# Patient Record
Sex: FEMALE | Race: WHITE | NOT HISPANIC OR LATINO | Employment: OTHER | URBAN - METROPOLITAN AREA
[De-identification: names, ages, dates, MRNs, and addresses within clinical notes are randomized per-mention and may not be internally consistent; named-entity substitution may affect disease eponyms.]

---

## 2017-01-17 DIAGNOSIS — E78.5 HYPERLIPIDEMIA LDL GOAL <130: Primary | ICD-10-CM

## 2017-01-17 NOTE — TELEPHONE ENCOUNTER
Routing refill request to provider for review/approval because:  Evei given x1 and patient did not follow up, please advise  Patient needs to be seen because it has been more than 1 year since last office visit.

## 2017-01-17 NOTE — TELEPHONE ENCOUNTER
omeprazole 20mg      Last Written Prescription Date: 12/12/16  Last Fill Quantity: 30,  # refills: 0   Last Office Visit with [facility], [facility] or [facility] prescribing provider: 08/07/15

## 2017-07-10 ENCOUNTER — HOSPITAL ENCOUNTER (OUTPATIENT)
Dept: MAMMOGRAPHY | Facility: CLINIC | Age: 57
Discharge: HOME | End: 2017-07-10
Attending: FAMILY MEDICINE | Admitting: FAMILY MEDICINE
Payer: COMMERCIAL

## 2017-07-10 DIAGNOSIS — Z12.31 VISIT FOR SCREENING MAMMOGRAM: ICD-10-CM

## 2017-07-10 PROCEDURE — G0202 SCR MAMMO BI INCL CAD: HCPCS

## 2017-07-10 PROCEDURE — 77063 BREAST TOMOSYNTHESIS BI: CPT

## 2019-01-03 ENCOUNTER — HOSPITAL ENCOUNTER (OUTPATIENT)
Facility: CLINIC | Age: 59
Discharge: HOME | End: 2019-01-03
Attending: COLON & RECTAL SURGERY | Admitting: COLON & RECTAL SURGERY
Payer: COMMERCIAL

## 2019-01-03 VITALS
OXYGEN SATURATION: 94 % | DIASTOLIC BLOOD PRESSURE: 91 MMHG | HEART RATE: 111 BPM | RESPIRATION RATE: 17 BRPM | SYSTOLIC BLOOD PRESSURE: 124 MMHG

## 2019-01-03 LAB — COLONOSCOPY: NORMAL

## 2019-01-03 PROCEDURE — 25000128 H RX IP 250 OP 636: Performed by: COLON & RECTAL SURGERY

## 2019-01-03 PROCEDURE — 88305 TISSUE EXAM BY PATHOLOGIST: CPT | Performed by: COLON & RECTAL SURGERY

## 2019-01-03 PROCEDURE — 99153 MOD SED SAME PHYS/QHP EA: CPT

## 2019-01-03 PROCEDURE — 45380 COLONOSCOPY AND BIOPSY: CPT | Performed by: COLON & RECTAL SURGERY

## 2019-01-03 PROCEDURE — G0500 MOD SEDAT ENDO SERVICE >5YRS: HCPCS | Performed by: COLON & RECTAL SURGERY

## 2019-01-03 PROCEDURE — 88305 TISSUE EXAM BY PATHOLOGIST: CPT | Mod: 26 | Performed by: COLON & RECTAL SURGERY

## 2019-01-03 RX ORDER — LIDOCAINE 40 MG/G
CREAM TOPICAL
Status: DISCONTINUED | OUTPATIENT
Start: 2019-01-03 | End: 2019-01-03 | Stop reason: HOSPADM

## 2019-01-03 RX ORDER — FENTANYL CITRATE 50 UG/ML
INJECTION, SOLUTION INTRAMUSCULAR; INTRAVENOUS PRN
Status: DISCONTINUED | OUTPATIENT
Start: 2019-01-03 | End: 2019-01-03 | Stop reason: HOSPADM

## 2019-01-03 RX ORDER — ONDANSETRON 2 MG/ML
4 INJECTION INTRAMUSCULAR; INTRAVENOUS
Status: COMPLETED | OUTPATIENT
Start: 2019-01-03 | End: 2019-01-03

## 2019-01-03 NOTE — H&P
Pre-Endoscopy History and Physical     Linda Tomlin MRN# [id number]   YOB: 1960 Age: 58 year old     Date of Procedure: (Not on file)  Primary care provider: Ramses Murphy  Type of Endoscopy: Colonoscopy  Reason for Procedure: History of diverticulitis and diarrhea  Type of Anesthesia Anticipated: Moderate Sedation    HPI:    Linda is a 58 year old female who will be undergoing the above procedure.      A history and physical has been performed. The patient's medications and allergies have been reviewed. The risks and benefits of the procedure and the sedation options and risks were discussed with the patient.  All questions were answered and informed consent was obtained.      She denies a personal or family history of anesthesia complications or bleeding disorders.     Allergies   Allergen Reactions     Percocet [Oxycodone-Acetaminophen] Rash     Chantix [Varenicline Tartrate] Nausea and Vomiting     SEVERE VOMITING          No current facility-administered medications on file prior to encounter.   Current Outpatient Medications on File Prior to Encounter:  atorvastatin (LIPITOR) 80 MG tablet Take 1 tablet (80 mg) by mouth every evening INDICATION: TO LOWER CHOLESTEROL AND TO HELP REDUCE RISK OF HEART ATTACK AND STROKE   citalopram (CELEXA) 40 MG tablet Take 1 tablet (40 mg) by mouth daily INDICATION: TO CONTROL SYMPTOMS OF DEPRESSION; No further refills till seen in the office.   cyanocobalamin (VITAMIN B12) 1000 MCG/ML injection Inject 1 mL (1,000 mcg) Subcutaneous every 14 days 1 CC EVERY TWO WEEKS INDICATION: FOR VITAMIN B12 SUPPLEMENTATION   metoprolol (TOPROL-XL) 50 MG 24 hr tablet Take 1 tablet (50 mg) by mouth 2 times daily INDICATION: TO LOWER BLOOD PRESSURE, no further refills until seen.   omeprazole (PRILOSEC) 20 MG CR capsule Take 1 capsule (20 mg) by mouth every morning (before breakfast) INDICATION: TO CONTROL REFLUX SYMPTOMS, No further refills till seen in the office.  "  Syringe/Needle, Disp, (BD ECLIPSE SYRINGE) 25G X 5/8\" 3 ML MISC 1,000 mcg See Admin Instructions AS DIRECTED FOR B12 SHOTS   traZODone (DESYREL) 100 MG tablet Take 1 tablet (100 mg) by mouth nightly as needed for sleep   vitamin C-electrolytes (EMERGEN-C) 1000mg vitamin C super orange drink mix Mix 1 packet in 4-6oz water and take once daily, INDICATION: VITAMIN C SUPPLEMENTION       Patient Active Problem List   Diagnosis     Moderate major depression (H)     HTN (hypertension)     Vitamin D deficiency disease     Hyperlipidemia LDL goal <130     Encounter for long-term (current) use of medications     Vitamin B12 deficiency without anemia     Scurvy     Closed fracture of one or more phalanges of foot     Vitamin D deficiency     Polycythemia     Low back pain     Ascorbic acid deficiency     Hyperlipidemia with target LDL less than 130     plantar fibroma, right         Past Medical History:   Diagnosis Date     Breast cancer (H) 2009    lumpectomy and radiation x 35     Diverticular disease of colon      Ovarian cyst         Past Surgical History:   Procedure Laterality Date     COLON SURGERY  [id number]    HEMICOLECTOMY     HAND SURGERY      Carpal Tunnel Finger/hand Surgery     HYSTERECTOMY, PAP NO LONGER INDICATED       LUMPECTOMY BREAST  2008     SALPINGO OOPHORECTOMY,R/L/SIMON      Salpingo Oophorectomy, RT/LT/SIMON     TONSILLECTOMY         Social History     Tobacco Use     Smoking status: Former Smoker     Packs/day: 1.00     Years: 10.00     Pack years: 10.00     Types: Cigarettes     Last attempt to quit: 2012     Years since quittin.4     Smokeless tobacco: Never Used     Tobacco comment: 2 years now as of 15   Substance Use Topics     Alcohol use: Yes     Alcohol/week: 0.5 - 1.0 oz     Types: 1 - 2 drink(s) per week       Family History   Problem Relation Age of Onset     Blood Disease Mother         HHT     Allergies Father      Breast Cancer Maternal Grandmother      " "Cancer Maternal Grandmother         mouth     Allergies Sister      Allergies Paternal Aunt      Allergies Paternal Uncle      Cancer - colorectal Maternal Uncle        REVIEW OF SYSTEMS:     5 point ROS negative except as noted above in HPI, including Gen., Resp., CV, GI &  system review.      PHYSICAL EXAM:   There were no vitals taken for this visit. Estimated body mass index is 28.45 kg/m  as calculated from the following:    Height as of 8/7/15: 1.6 m (5' 3\").    Weight as of 8/7/15: 72.8 kg (160 lb 9.6 oz).   GENERAL APPEARANCE: healthy and alert  MENTAL STATUS: alert  RESP: lungs clear to auscultation - no rales, rhonchi or wheezes  CV: regular rates and rhythm and normal S1 S2, no S3 or S4      IMPRESSION   ASA Class 2 - Mild systemic disease        PLAN:     Plan for colonoscopy. We discussed the risks, benefits and alternatives and the patient wished to proceed.    The above has been forwarded to the consulting provider.      Mane Jay MD  [facility]  Phone: [phone]  January 3, 2019    "

## 2019-01-04 LAB — COPATH REPORT: NORMAL

## 2019-04-18 ENCOUNTER — HOSPITAL PATHOLOGY (OUTPATIENT)
Dept: OTHER | Facility: CLINIC | Age: 59
End: 2019-04-18

## 2019-04-22 LAB — COPATH REPORT: NORMAL

## 2019-06-03 ENCOUNTER — HOSPITAL ENCOUNTER (OUTPATIENT)
Dept: MAMMOGRAPHY | Facility: CLINIC | Age: 59
Discharge: HOME | End: 2019-06-03
Attending: FAMILY MEDICINE | Admitting: FAMILY MEDICINE
Payer: COMMERCIAL

## 2019-06-03 DIAGNOSIS — Z12.31 VISIT FOR SCREENING MAMMOGRAM: ICD-10-CM

## 2019-06-03 PROCEDURE — 77063 BREAST TOMOSYNTHESIS BI: CPT

## 2019-09-29 ENCOUNTER — HEALTH MAINTENANCE LETTER (OUTPATIENT)
Age: 59
End: 2019-09-29

## 2021-01-14 ENCOUNTER — HEALTH MAINTENANCE LETTER (OUTPATIENT)
Age: 61
End: 2021-01-14

## 2021-03-01 ENCOUNTER — HOSPITAL ENCOUNTER (OUTPATIENT)
Dept: MAMMOGRAPHY | Facility: CLINIC | Age: 61
Discharge: HOME | End: 2021-03-01
Attending: FAMILY MEDICINE | Admitting: FAMILY MEDICINE
Payer: COMMERCIAL

## 2021-03-01 DIAGNOSIS — Z12.31 VISIT FOR SCREENING MAMMOGRAM: ICD-10-CM

## 2021-03-01 PROCEDURE — 77063 BREAST TOMOSYNTHESIS BI: CPT

## 2021-09-30 ENCOUNTER — TRANSFERRED RECORDS (OUTPATIENT)
Dept: HEALTH INFORMATION MANAGEMENT | Facility: CLINIC | Age: 61
End: 2021-09-30

## 2021-09-30 DIAGNOSIS — R94.31 ABNORMAL ELECTROCARDIOGRAM: Primary | ICD-10-CM

## 2021-10-03 ENCOUNTER — MEDICAL CORRESPONDENCE (OUTPATIENT)
Dept: HEALTH INFORMATION MANAGEMENT | Facility: CLINIC | Age: 61
End: 2021-10-03

## 2021-10-06 ENCOUNTER — OFFICE VISIT (OUTPATIENT)
Dept: CARDIOLOGY | Facility: CLINIC | Age: 61
End: 2021-10-06
Payer: COMMERCIAL

## 2021-10-06 VITALS
WEIGHT: 143 LBS | BODY MASS INDEX: 25.33 KG/M2 | HEART RATE: 87 BPM | SYSTOLIC BLOOD PRESSURE: 125 MMHG | DIASTOLIC BLOOD PRESSURE: 79 MMHG

## 2021-10-06 DIAGNOSIS — Z71.6 ENCOUNTER FOR SMOKING CESSATION COUNSELING: Primary | ICD-10-CM

## 2021-10-06 DIAGNOSIS — R94.31 ABNORMAL ELECTROCARDIOGRAM: ICD-10-CM

## 2021-10-06 PROCEDURE — 99203 OFFICE O/P NEW LOW 30 MIN: CPT | Performed by: INTERNAL MEDICINE

## 2021-10-06 RX ORDER — ALBUTEROL SULFATE 90 UG/1
AEROSOL, METERED RESPIRATORY (INHALATION)
COMMUNITY
Start: 2021-03-29 | End: 2022-01-09

## 2021-10-06 RX ORDER — BUPROPION HYDROCHLORIDE 150 MG/1
150 TABLET ORAL DAILY
COMMUNITY
Start: 2021-07-26 | End: 2022-01-13

## 2021-10-06 RX ORDER — MUPIROCIN 20 MG/G
OINTMENT TOPICAL
COMMUNITY
Start: 2021-07-26 | End: 2022-01-09

## 2021-10-06 RX ORDER — LISINOPRIL 10 MG/1
10 TABLET ORAL DAILY
COMMUNITY
Start: 2021-07-26 | End: 2022-01-13 | Stop reason: DRUGHIGH

## 2021-10-06 RX ORDER — METRONIDAZOLE 7.5 MG/G
GEL TOPICAL
COMMUNITY
Start: 2021-06-11 | End: 2022-01-09

## 2021-10-06 RX ORDER — ESCITALOPRAM OXALATE 20 MG/1
20 TABLET ORAL DAILY
COMMUNITY
Start: 2021-07-26 | End: 2022-01-13

## 2021-10-06 NOTE — PROGRESS NOTES
HPI and Plan:     Nydia is a very pleasant 61-year-old comes in by referral from her primary physician for abnormal EKG.  She is a history of smoking half pack per day currently hypertension hyperlipidemia who had some abdominal pain and shoulder discomfort and therefore had an EKG done.  EKG showed evidence of not a normal sinus rhythm with Q waves in V1 and V2 consistent with old anteroseptal infarction and left atrial enlargement.  She said she had an EKG done and going through care everywhere that was done about 12 years ago but is not able to be pulled up.  She has had no recurrent shoulder pain.  Shoulder pain did not accompany shortness of breath diaphoresis nausea vomiting.  She does no regular exercise but is active.  Unfortunately her  has recently been diagnosed with Alzheimer's.    Assessment: EKG has to be assumed to be new anteroseptal infarction.  This could be a false positive.  Recommend echocardiogram and if abnormal recommend stress testing.  If her shoulder pain should return I recommend stress testing and further evaluation as well.  Otherwise we talked very frankly about smoking cessation changes in her diet and exercise plan for her long-term cardiovascular health.      Orders Placed This Encounter   Medications     VENTOLIN  (90 Base) MCG/ACT inhaler     Sig: INHALE 1 PUFF EVERY 4-6 HOURS AS NEEDED FOR COUGH AND CHEST TIGHTNESS     buPROPion (WELLBUTRIN XL) 150 MG 24 hr tablet     Sig: Take 150 mg by mouth daily     escitalopram (LEXAPRO) 20 MG tablet     Sig: Take 20 mg by mouth daily     lisinopril (ZESTRIL) 10 MG tablet     Sig: Take 10 mg by mouth daily     metroNIDAZOLE (METROGEL) 0.75 % external gel     Sig: APPLY TOPICALLY TO AFFECTED AREA ONCE DAILY AT BEDTIME AS NEEDED     mupirocin (BACTROBAN) 2 % external ointment     Sig: APPLY 1 APPLICATION UP TO TWICE DAILY FOR 7 DAYS     There are no discontinued medications.      Encounter Diagnoses   Name Primary?     Abnormal  electrocardiogram      Encounter for smoking cessation counseling Yes       CURRENT MEDICATIONS:  Current Outpatient Medications   Medication Sig Dispense Refill     atorvastatin (LIPITOR) 80 MG tablet Take 1 tablet (80 mg) by mouth every evening INDICATION: TO LOWER CHOLESTEROL AND TO HELP REDUCE RISK OF HEART ATTACK AND STROKE 90 tablet 0     buPROPion (WELLBUTRIN XL) 150 MG 24 hr tablet Take 150 mg by mouth daily       escitalopram (LEXAPRO) 20 MG tablet Take 20 mg by mouth daily       lisinopril (ZESTRIL) 10 MG tablet Take 10 mg by mouth daily       metoprolol (TOPROL-XL) 50 MG 24 hr tablet Take 1 tablet (50 mg) by mouth 2 times daily INDICATION: TO LOWER BLOOD PRESSURE, no further refills until seen. (Patient taking differently: Take 50 mg by mouth daily INDICATION: TO LOWER BLOOD PRESSURE, no further refills until seen.) 60 tablet 0     metroNIDAZOLE (METROGEL) 0.75 % external gel APPLY TOPICALLY TO AFFECTED AREA ONCE DAILY AT BEDTIME AS NEEDED       omeprazole (PRILOSEC) 20 MG CR capsule Take 1 capsule (20 mg) by mouth every morning (before breakfast) INDICATION: TO CONTROL REFLUX SYMPTOMS, No further refills till seen in the office. 30 capsule 0     citalopram (CELEXA) 40 MG tablet Take 1 tablet (40 mg) by mouth daily INDICATION: TO CONTROL SYMPTOMS OF DEPRESSION; No further refills till seen in the office. (Patient not taking: Reported on 10/6/2021) 30 tablet 1     mupirocin (BACTROBAN) 2 % external ointment APPLY 1 APPLICATION UP TO TWICE DAILY FOR 7 DAYS (Patient not taking: Reported on 10/6/2021)       VENTOLIN  (90 Base) MCG/ACT inhaler INHALE 1 PUFF EVERY 4-6 HOURS AS NEEDED FOR COUGH AND CHEST TIGHTNESS (Patient not taking: Reported on 10/6/2021)       vitamin C-electrolytes (EMERGEN-C) 1000mg vitamin C super orange drink mix Mix 1 packet in 4-6oz water and take once daily, INDICATION: VITAMIN C SUPPLEMENTION (Patient not taking: Reported on 10/6/2021) 90 packet PRN       ALLERGIES      Allergies   Allergen Reactions     Percocet [Oxycodone-Acetaminophen] Rash     Chantix [Varenicline Tartrate] Nausea and Vomiting     SEVERE VOMITING       PAST MEDICAL HISTORY:  Past Medical History:   Diagnosis Date     Breast cancer (H) 2009    lumpectomy and radiation x 35     Diverticular disease of colon      Ovarian cyst        PAST SURGICAL HISTORY:  Past Surgical History:   Procedure Laterality Date     COLON SURGERY  [id number]    HEMICOLECTOMY     COLONOSCOPY N/A 1/3/2019    Procedure: COMBINED COLONOSCOPY, SINGLE OR MULTIPLE BIOPSY/POLYPECTOMY BY BIOPSY;  Surgeon: Mane Jay MD;  Location:  GI     HAND SURGERY      Carpal Tunnel Finger/hand Surgery     HYSTERECTOMY, PAP NO LONGER INDICATED       LUMPECTOMY BREAST  2008     SALPINGO OOPHORECTOMY,R/L/SIMON      Salpingo Oophorectomy, RT/LT/SIMON     TONSILLECTOMY         FAMILY HISTORY:  Family History   Problem Relation Age of Onset     Blood Disease Mother         HHT     Allergies Father      Breast Cancer Maternal Grandmother      Cancer Maternal Grandmother         mouth     Allergies Sister      Allergies Paternal Uncle      Cancer - colorectal Maternal Uncle      Allergies Paternal Aunt        SOCIAL HISTORY:  Social History     Socioeconomic History     Marital status:      Spouse name: None     Number of children: None     Years of education: None     Highest education level: None   Occupational History     None   Tobacco Use     Smoking status: Current Every Day Smoker     Packs/day: 1.00     Years: 10.00     Pack years: 10.00     Types: Cigarettes     Last attempt to quit: 2012     Years since quittin.1     Smokeless tobacco: Never Used     Tobacco comment: 2 years now as of 15   Substance and Sexual Activity     Alcohol use: Yes     Alcohol/week: 0.8 - 1.7 standard drinks     Types: 1 - 2 Standard drinks or equivalent per week     Drug use: No     Sexual activity: Yes     Partners: Male   Other Topics  Concern     Parent/sibling w/ CABG, MI or angioplasty before 65F 55M? Not Asked   Social History Narrative     None     Social Determinants of Health     Financial Resource Strain:      Difficulty of Paying Living Expenses:    Food Insecurity:      Worried About Running Out of Food in the Last Year:      Ran Out of Food in the Last Year:    Transportation Needs:      Lack of Transportation (Medical):      Lack of Transportation (Non-Medical):    Physical Activity:      Days of Exercise per Week:      Minutes of Exercise per Session:    Stress:      Feeling of Stress :    Social Connections:      Frequency of Communication with Friends and Family:      Frequency of Social Gatherings with Friends and Family:      Attends Buddhist Services:      Active Member of Clubs or Organizations:      Attends Club or Organization Meetings:      Marital Status:    Intimate Partner Violence:      Fear of Current or Ex-Partner:      Emotionally Abused:      Physically Abused:      Sexually Abused:        Review of Systems:  Skin:  Negative     Eyes:  Positive for glasses  ENT:  Positive for hearing loss  Respiratory:  Negative    Cardiovascular:    Positive for;palpitations  Gastroenterology: Positive for heartburn  Genitourinary:  not assessed    Musculoskeletal:  Negative    Neurologic:  Negative    Psychiatric:  not assessed    Heme/Lymph/Imm:  Negative    Endocrine:  Negative            Physical Exam:  Vitals: /79   Pulse 87   Wt 64.9 kg (143 lb)   BMI 25.33 kg/m    Constitutional: cooperative, alert and oriented, well developed, well nourished, in no acute distress   Skin: warm and dry to the touch, no apparent skin lesions or masses noted   Head: normocephalic, no masses or lesions   Eyes: pupils equal and round, conjunctivae and lids unremarkable, sclera white, no xanthalasma, EOMS intact, no nystagmus   ENT: no pallor or cyanosis, dentition good   Neck: carotid pulses are full and equal bilaterally, JVP normal, no  carotid bruit, no thyromegaly   Chest: normal breath sounds, clear to auscultation, normal A-P diameter, normal symmetry, normal respiratory excursion, no use of accessory muscles   Cardiac: regular rhythm, normal S1/S2, no S3 or S4, apical impulse not displaced, no murmurs, gallops or rubs no presence of murmur   Abdomen: abdomen soft, non-tender, BS normoactive, no mass, no HSM, no bruits   Vascular: pulses full and equal, no bruits auscultated   Extremities and Back: no deformities, clubbing, cyanosis, erythema observed   Neurological: affect appropriate, oriented to time, person and place     Recent Lab Results:  LIPID RESULTS:  Lab Results   Component Value Date    CHOL 229 10/20/2016    HDL 80 10/20/2016     10/20/2016    TRIG 179 10/20/2016    CHOLHDLRATIO 2.6 03/24/2014       LIVER ENZYME RESULTS:  Lab Results   Component Value Date    AST 23 08/07/2015    ALT 48 08/07/2015       CBC RESULTS:  Lab Results   Component Value Date    WBC 7.5 08/07/2015    RBC 5.07 08/07/2015    HGB 15.9 (H) 08/07/2015    HCT 47.3 (H) 08/07/2015    MCV 93 08/07/2015    MCH 31.4 08/07/2015    MCHC 33.6 08/07/2015    RDW 14.2 08/07/2015     08/07/2015       BMP RESULTS:  Lab Results   Component Value Date     08/07/2015    POTASSIUM 4.4 08/07/2015    CHLORIDE 106 08/07/2015    CO2 30 08/07/2015    ANIONGAP 4 08/07/2015    GLC 90 10/20/2016    BUN 11 08/07/2015    CR 0.74 08/07/2015    GFRESTIMATED 81 08/07/2015    GFRESTBLACK >90   GFR Calc   08/07/2015    HUMA 9.1 08/07/2015        A1C RESULTS:  No results found for: A1C    INR RESULTS:  No results found for: INR        CC  Julissa Porter PA-C  [facility]  [address] [facility] [address]  [place] [id number]

## 2021-11-02 ENCOUNTER — HOSPITAL ENCOUNTER (OUTPATIENT)
Dept: CARDIOLOGY | Facility: CLINIC | Age: 61
Discharge: HOME | End: 2021-11-02
Attending: INTERNAL MEDICINE | Admitting: INTERNAL MEDICINE
Payer: COMMERCIAL

## 2021-11-02 DIAGNOSIS — R94.31 ABNORMAL ELECTROCARDIOGRAM: ICD-10-CM

## 2021-11-02 DIAGNOSIS — Z71.6 ENCOUNTER FOR SMOKING CESSATION COUNSELING: ICD-10-CM

## 2021-11-02 LAB — LVEF ECHO: NORMAL

## 2021-11-02 PROCEDURE — 93306 TTE W/DOPPLER COMPLETE: CPT

## 2021-11-02 PROCEDURE — 93306 TTE W/DOPPLER COMPLETE: CPT | Mod: 26 | Performed by: INTERNAL MEDICINE

## 2022-01-09 ENCOUNTER — APPOINTMENT (OUTPATIENT)
Dept: CT IMAGING | Facility: CLINIC | Age: 62
End: 2022-01-09
Attending: EMERGENCY MEDICINE
Payer: COMMERCIAL

## 2022-01-09 ENCOUNTER — HOSPITAL ENCOUNTER (INPATIENT)
Facility: CLINIC | Age: 62
LOS: 1 days | Discharge: HOME | End: 2022-01-10
Attending: EMERGENCY MEDICINE | Admitting: INTERNAL MEDICINE
Payer: COMMERCIAL

## 2022-01-09 DIAGNOSIS — E78.5 HYPERLIPIDEMIA LDL GOAL <130: ICD-10-CM

## 2022-01-09 DIAGNOSIS — R47.01 APHASIA: ICD-10-CM

## 2022-01-09 DIAGNOSIS — I10 PRIMARY HYPERTENSION: ICD-10-CM

## 2022-01-09 DIAGNOSIS — R26.9 GAIT DISTURBANCE: ICD-10-CM

## 2022-01-09 DIAGNOSIS — F17.200 TOBACCO USE DISORDER: ICD-10-CM

## 2022-01-09 DIAGNOSIS — E78.5 HYPERLIPIDEMIA WITH TARGET LDL LESS THAN 130: Primary | ICD-10-CM

## 2022-01-09 DIAGNOSIS — G45.9 TIA (TRANSIENT ISCHEMIC ATTACK): ICD-10-CM

## 2022-01-09 DIAGNOSIS — I63.9 ACUTE ISCHEMIC STROKE (H): ICD-10-CM

## 2022-01-09 LAB
ANION GAP SERPL CALCULATED.3IONS-SCNC: 8 MMOL/L (ref 3–14)
APTT PPP: 30 SECONDS (ref 22–38)
BASOPHILS # BLD AUTO: 0 10E3/UL (ref 0–0.2)
BASOPHILS NFR BLD AUTO: 0 %
BUN SERPL-MCNC: 8 MG/DL (ref 7–30)
CALCIUM SERPL-MCNC: 8.5 MG/DL (ref 8.5–10.1)
CHLORIDE BLD-SCNC: 107 MMOL/L (ref 94–109)
CO2 SERPL-SCNC: 25 MMOL/L (ref 20–32)
CREAT SERPL-MCNC: 0.72 MG/DL (ref 0.52–1.04)
EOSINOPHIL # BLD AUTO: 0.1 10E3/UL (ref 0–0.7)
EOSINOPHIL NFR BLD AUTO: 1 %
ERYTHROCYTE [DISTWIDTH] IN BLOOD BY AUTOMATED COUNT: 13.4 % (ref 10–15)
GFR SERPL CREATININE-BSD FRML MDRD: >90 ML/MIN/1.73M2
GLUCOSE BLD-MCNC: 131 MG/DL (ref 70–99)
HCT VFR BLD AUTO: 54.9 % (ref 35–47)
HGB BLD-MCNC: 18.6 G/DL (ref 11.7–15.7)
HOLD SPECIMEN: NORMAL
HOLD SPECIMEN: NORMAL
IMM GRANULOCYTES # BLD: 0 10E3/UL
IMM GRANULOCYTES NFR BLD: 0 %
INR PPP: 1.01 (ref 0.85–1.15)
LYMPHOCYTES # BLD AUTO: 1.9 10E3/UL (ref 0.8–5.3)
LYMPHOCYTES NFR BLD AUTO: 32 %
MCH RBC QN AUTO: 32.5 PG (ref 26.5–33)
MCHC RBC AUTO-ENTMCNC: 33.9 G/DL (ref 31.5–36.5)
MCV RBC AUTO: 96 FL (ref 78–100)
MONOCYTES # BLD AUTO: 0.5 10E3/UL (ref 0–1.3)
MONOCYTES NFR BLD AUTO: 8 %
NEUTROPHILS # BLD AUTO: 3.5 10E3/UL (ref 1.6–8.3)
NEUTROPHILS NFR BLD AUTO: 59 %
NRBC # BLD AUTO: 0 10E3/UL
NRBC BLD AUTO-RTO: 0 /100
PLATELET # BLD AUTO: 234 10E3/UL (ref 150–450)
POTASSIUM BLD-SCNC: 3.6 MMOL/L (ref 3.4–5.3)
RBC # BLD AUTO: 5.72 10E6/UL (ref 3.8–5.2)
SARS-COV-2 RNA RESP QL NAA+PROBE: NEGATIVE
SODIUM SERPL-SCNC: 140 MMOL/L (ref 133–144)
TROPONIN I SERPL HS-MCNC: 5 NG/L
WBC # BLD AUTO: 6 10E3/UL (ref 4–11)

## 2022-01-09 PROCEDURE — 99220 PR INITIAL OBSERVATION CARE,LEVEL III: CPT | Performed by: INTERNAL MEDICINE

## 2022-01-09 PROCEDURE — 83036 HEMOGLOBIN GLYCOSYLATED A1C: CPT | Performed by: INTERNAL MEDICINE

## 2022-01-09 PROCEDURE — 80061 LIPID PANEL: CPT | Performed by: INTERNAL MEDICINE

## 2022-01-09 PROCEDURE — C9803 HOPD COVID-19 SPEC COLLECT: HCPCS

## 2022-01-09 PROCEDURE — 250N000013 HC RX MED GY IP 250 OP 250 PS 637: Performed by: EMERGENCY MEDICINE

## 2022-01-09 PROCEDURE — 84484 ASSAY OF TROPONIN QUANT: CPT | Performed by: EMERGENCY MEDICINE

## 2022-01-09 PROCEDURE — 93005 ELECTROCARDIOGRAM TRACING: CPT

## 2022-01-09 PROCEDURE — 36415 COLL VENOUS BLD VENIPUNCTURE: CPT | Performed by: EMERGENCY MEDICINE

## 2022-01-09 PROCEDURE — 258N000003 HC RX IP 258 OP 636: Performed by: EMERGENCY MEDICINE

## 2022-01-09 PROCEDURE — 87635 SARS-COV-2 COVID-19 AMP PRB: CPT | Performed by: EMERGENCY MEDICINE

## 2022-01-09 PROCEDURE — 85730 THROMBOPLASTIN TIME PARTIAL: CPT | Performed by: EMERGENCY MEDICINE

## 2022-01-09 PROCEDURE — 70450 CT HEAD/BRAIN W/O DYE: CPT

## 2022-01-09 PROCEDURE — 85610 PROTHROMBIN TIME: CPT | Performed by: EMERGENCY MEDICINE

## 2022-01-09 PROCEDURE — 250N000011 HC RX IP 250 OP 636: Performed by: EMERGENCY MEDICINE

## 2022-01-09 PROCEDURE — 0042T CT HEAD PERFUSION WITH CONTRAST: CPT

## 2022-01-09 PROCEDURE — G0378 HOSPITAL OBSERVATION PER HR: HCPCS

## 2022-01-09 PROCEDURE — 96360 HYDRATION IV INFUSION INIT: CPT | Mod: 59

## 2022-01-09 PROCEDURE — 70498 CT ANGIOGRAPHY NECK: CPT

## 2022-01-09 PROCEDURE — 80048 BASIC METABOLIC PNL TOTAL CA: CPT | Performed by: EMERGENCY MEDICINE

## 2022-01-09 PROCEDURE — 70496 CT ANGIOGRAPHY HEAD: CPT

## 2022-01-09 PROCEDURE — 99285 EMERGENCY DEPT VISIT HI MDM: CPT | Mod: 25

## 2022-01-09 PROCEDURE — 85025 COMPLETE CBC W/AUTO DIFF WBC: CPT | Performed by: EMERGENCY MEDICINE

## 2022-01-09 PROCEDURE — 250N000009 HC RX 250: Performed by: EMERGENCY MEDICINE

## 2022-01-09 RX ORDER — ASPIRIN 81 MG/1
81 TABLET, CHEWABLE ORAL DAILY
Status: DISCONTINUED | OUTPATIENT
Start: 2022-01-10 | End: 2022-01-10 | Stop reason: HOSPADM

## 2022-01-09 RX ORDER — ASPIRIN 81 MG/1
81 TABLET ORAL DAILY
Status: DISCONTINUED | OUTPATIENT
Start: 2022-01-10 | End: 2022-01-10 | Stop reason: HOSPADM

## 2022-01-09 RX ORDER — ENALAPRILAT 1.25 MG/ML
1.25 INJECTION INTRAVENOUS EVERY 6 HOURS PRN
Status: DISCONTINUED | OUTPATIENT
Start: 2022-01-09 | End: 2022-01-10 | Stop reason: HOSPADM

## 2022-01-09 RX ORDER — ONDANSETRON 4 MG/1
4 TABLET, ORALLY DISINTEGRATING ORAL EVERY 6 HOURS PRN
Status: DISCONTINUED | OUTPATIENT
Start: 2022-01-09 | End: 2022-01-10 | Stop reason: HOSPADM

## 2022-01-09 RX ORDER — METOPROLOL SUCCINATE 50 MG/1
50 TABLET, EXTENDED RELEASE ORAL DAILY
COMMUNITY
End: 2022-01-13

## 2022-01-09 RX ORDER — AMOXICILLIN 250 MG
1-2 CAPSULE ORAL 2 TIMES DAILY
Status: DISCONTINUED | OUTPATIENT
Start: 2022-01-10 | End: 2022-01-10 | Stop reason: HOSPADM

## 2022-01-09 RX ORDER — ACETAMINOPHEN 325 MG/1
650 TABLET ORAL EVERY 4 HOURS PRN
Status: DISCONTINUED | OUTPATIENT
Start: 2022-01-09 | End: 2022-01-10 | Stop reason: HOSPADM

## 2022-01-09 RX ORDER — PROCHLORPERAZINE 25 MG
25 SUPPOSITORY, RECTAL RECTAL EVERY 12 HOURS PRN
Status: DISCONTINUED | OUTPATIENT
Start: 2022-01-09 | End: 2022-01-10 | Stop reason: HOSPADM

## 2022-01-09 RX ORDER — ACETAMINOPHEN 650 MG/1
650 SUPPOSITORY RECTAL EVERY 4 HOURS PRN
Status: DISCONTINUED | OUTPATIENT
Start: 2022-01-09 | End: 2022-01-10 | Stop reason: HOSPADM

## 2022-01-09 RX ORDER — ONDANSETRON 2 MG/ML
4 INJECTION INTRAMUSCULAR; INTRAVENOUS EVERY 6 HOURS PRN
Status: DISCONTINUED | OUTPATIENT
Start: 2022-01-09 | End: 2022-01-10 | Stop reason: HOSPADM

## 2022-01-09 RX ORDER — POLYETHYLENE GLYCOL 3350 17 G/17G
17 POWDER, FOR SOLUTION ORAL DAILY
Status: DISCONTINUED | OUTPATIENT
Start: 2022-01-10 | End: 2022-01-10 | Stop reason: HOSPADM

## 2022-01-09 RX ORDER — ATORVASTATIN CALCIUM 40 MG/1
40 TABLET, FILM COATED ORAL
Status: DISCONTINUED | OUTPATIENT
Start: 2022-01-10 | End: 2022-01-10 | Stop reason: HOSPADM

## 2022-01-09 RX ORDER — PROCHLORPERAZINE MALEATE 5 MG
10 TABLET ORAL EVERY 6 HOURS PRN
Status: DISCONTINUED | OUTPATIENT
Start: 2022-01-09 | End: 2022-01-10 | Stop reason: HOSPADM

## 2022-01-09 RX ORDER — IOPAMIDOL 755 MG/ML
120 INJECTION, SOLUTION INTRAVASCULAR ONCE
Status: COMPLETED | OUTPATIENT
Start: 2022-01-09 | End: 2022-01-09

## 2022-01-09 RX ORDER — CLOPIDOGREL 300 MG/1
300 TABLET, FILM COATED ORAL ONCE
Status: COMPLETED | OUTPATIENT
Start: 2022-01-09 | End: 2022-01-09

## 2022-01-09 RX ORDER — ASPIRIN 325 MG
325 TABLET ORAL ONCE
Status: COMPLETED | OUTPATIENT
Start: 2022-01-09 | End: 2022-01-09

## 2022-01-09 RX ADMIN — CLOPIDOGREL BISULFATE 300 MG: 300 TABLET, FILM COATED ORAL at 20:19

## 2022-01-09 RX ADMIN — SODIUM CHLORIDE 1000 ML: 9 INJECTION, SOLUTION INTRAVENOUS at 21:39

## 2022-01-09 RX ADMIN — SODIUM CHLORIDE 100 ML: 900 INJECTION INTRAVENOUS at 19:23

## 2022-01-09 RX ADMIN — MELATONIN 5 MG TABLET 10 MG: at 22:27

## 2022-01-09 RX ADMIN — IOPAMIDOL 70 ML: 755 INJECTION, SOLUTION INTRAVENOUS at 19:23

## 2022-01-09 RX ADMIN — ASPIRIN 325 MG ORAL TABLET 325 MG: 325 PILL ORAL at 20:19

## 2022-01-09 ASSESSMENT — ENCOUNTER SYMPTOMS
FACIAL ASYMMETRY: 1
SPEECH DIFFICULTY: 1

## 2022-01-10 ENCOUNTER — APPOINTMENT (OUTPATIENT)
Dept: SPEECH THERAPY | Facility: CLINIC | Age: 62
End: 2022-01-10
Attending: INTERNAL MEDICINE
Payer: COMMERCIAL

## 2022-01-10 ENCOUNTER — APPOINTMENT (OUTPATIENT)
Dept: CARDIOLOGY | Facility: CLINIC | Age: 62
End: 2022-01-10
Attending: INTERNAL MEDICINE
Payer: COMMERCIAL

## 2022-01-10 ENCOUNTER — APPOINTMENT (OUTPATIENT)
Dept: MRI IMAGING | Facility: CLINIC | Age: 62
End: 2022-01-10
Attending: INTERNAL MEDICINE
Payer: COMMERCIAL

## 2022-01-10 ENCOUNTER — APPOINTMENT (OUTPATIENT)
Dept: PHYSICAL THERAPY | Facility: CLINIC | Age: 62
End: 2022-01-10
Attending: INTERNAL MEDICINE
Payer: COMMERCIAL

## 2022-01-10 VITALS
RESPIRATION RATE: 18 BRPM | SYSTOLIC BLOOD PRESSURE: 144 MMHG | HEART RATE: 77 BPM | WEIGHT: 149.9 LBS | TEMPERATURE: 98.1 F | HEIGHT: 63 IN | OXYGEN SATURATION: 97 % | DIASTOLIC BLOOD PRESSURE: 106 MMHG | BODY MASS INDEX: 26.56 KG/M2

## 2022-01-10 PROBLEM — G45.9 TIA (TRANSIENT ISCHEMIC ATTACK): Status: ACTIVE | Noted: 2022-01-10

## 2022-01-10 PROBLEM — I63.9 ACUTE ISCHEMIC STROKE (H): Status: ACTIVE | Noted: 2022-01-10

## 2022-01-10 LAB
ATRIAL RATE - MUSE: 77 BPM
CHOLEST SERPL-MCNC: 234 MG/DL
DIASTOLIC BLOOD PRESSURE - MUSE: NORMAL MMHG
GLUCOSE BLDC GLUCOMTR-MCNC: 101 MG/DL (ref 70–99)
GLUCOSE BLDC GLUCOMTR-MCNC: 112 MG/DL (ref 70–99)
GLUCOSE BLDC GLUCOMTR-MCNC: 113 MG/DL (ref 70–99)
HBA1C MFR BLD: 5.4 % (ref 0–5.6)
HDLC SERPL-MCNC: 68 MG/DL
INTERPRETATION ECG - MUSE: NORMAL
LDLC SERPL CALC-MCNC: 116 MG/DL
LVEF ECHO: NORMAL
NONHDLC SERPL-MCNC: 166 MG/DL
P AXIS - MUSE: 46 DEGREES
PR INTERVAL - MUSE: 180 MS
QRS DURATION - MUSE: 66 MS
QT - MUSE: 402 MS
QTC - MUSE: 454 MS
R AXIS - MUSE: -6 DEGREES
SYSTOLIC BLOOD PRESSURE - MUSE: NORMAL MMHG
T AXIS - MUSE: 52 DEGREES
TRIGL SERPL-MCNC: 249 MG/DL
VENTRICULAR RATE- MUSE: 77 BPM

## 2022-01-10 PROCEDURE — 250N000013 HC RX MED GY IP 250 OP 250 PS 637: Performed by: INTERNAL MEDICINE

## 2022-01-10 PROCEDURE — 93308 TTE F-UP OR LMTD: CPT | Mod: 26 | Performed by: INTERNAL MEDICINE

## 2022-01-10 PROCEDURE — 93270 REMOTE 30 DAY ECG REV/REPORT: CPT

## 2022-01-10 PROCEDURE — 999N000111 HC STATISTIC OT IP EVAL DEFER: Performed by: OCCUPATIONAL THERAPIST

## 2022-01-10 PROCEDURE — A9585 GADOBUTROL INJECTION: HCPCS | Performed by: INTERNAL MEDICINE

## 2022-01-10 PROCEDURE — G0378 HOSPITAL OBSERVATION PER HR: HCPCS

## 2022-01-10 PROCEDURE — 99239 HOSP IP/OBS DSCHRG MGMT >30: CPT | Performed by: INTERNAL MEDICINE

## 2022-01-10 PROCEDURE — 93272 ECG/REVIEW INTERPRET ONLY: CPT | Performed by: INTERNAL MEDICINE

## 2022-01-10 PROCEDURE — 99255 IP/OBS CONSLTJ NEW/EST HI 80: CPT | Mod: GC | Performed by: PSYCHIATRY & NEUROLOGY

## 2022-01-10 PROCEDURE — 92610 EVALUATE SWALLOWING FUNCTION: CPT | Mod: GN | Performed by: SPEECH-LANGUAGE PATHOLOGIST

## 2022-01-10 PROCEDURE — 70553 MRI BRAIN STEM W/O & W/DYE: CPT

## 2022-01-10 PROCEDURE — 93321 DOPPLER ECHO F-UP/LMTD STD: CPT | Mod: 26 | Performed by: INTERNAL MEDICINE

## 2022-01-10 PROCEDURE — 120N000004 HC R&B MS OVERFLOW

## 2022-01-10 PROCEDURE — 82962 GLUCOSE BLOOD TEST: CPT

## 2022-01-10 PROCEDURE — 93325 DOPPLER ECHO COLOR FLOW MAPG: CPT

## 2022-01-10 PROCEDURE — 93325 DOPPLER ECHO COLOR FLOW MAPG: CPT | Mod: 26 | Performed by: INTERNAL MEDICINE

## 2022-01-10 PROCEDURE — 97161 PT EVAL LOW COMPLEX 20 MIN: CPT | Mod: GP

## 2022-01-10 PROCEDURE — 97110 THERAPEUTIC EXERCISES: CPT | Mod: GP

## 2022-01-10 PROCEDURE — 255N000002 HC RX 255 OP 636: Performed by: INTERNAL MEDICINE

## 2022-01-10 RX ORDER — CLOPIDOGREL BISULFATE 75 MG/1
75 TABLET ORAL DAILY
Qty: 21 TABLET | Refills: 0 | Status: SHIPPED | OUTPATIENT
Start: 2022-01-11 | End: 2022-04-12

## 2022-01-10 RX ORDER — GADOBUTROL 604.72 MG/ML
6 INJECTION INTRAVENOUS ONCE
Status: COMPLETED | OUTPATIENT
Start: 2022-01-10 | End: 2022-01-10

## 2022-01-10 RX ORDER — ESCITALOPRAM OXALATE 20 MG/1
20 TABLET ORAL DAILY
Status: DISCONTINUED | OUTPATIENT
Start: 2022-01-10 | End: 2022-01-10 | Stop reason: HOSPADM

## 2022-01-10 RX ORDER — BUPROPION HYDROCHLORIDE 150 MG/1
150 TABLET ORAL DAILY
Status: DISCONTINUED | OUTPATIENT
Start: 2022-01-10 | End: 2022-01-10 | Stop reason: HOSPADM

## 2022-01-10 RX ORDER — ATORVASTATIN CALCIUM 40 MG/1
40 TABLET, FILM COATED ORAL DAILY
Qty: 30 TABLET | Refills: 0 | Status: SHIPPED | OUTPATIENT
Start: 2022-01-10 | End: 2023-04-25

## 2022-01-10 RX ORDER — CLOPIDOGREL BISULFATE 75 MG/1
75 TABLET ORAL DAILY
Status: DISCONTINUED | OUTPATIENT
Start: 2022-01-10 | End: 2022-01-10 | Stop reason: HOSPADM

## 2022-01-10 RX ADMIN — GADOBUTROL 6 ML: 604.72 INJECTION INTRAVENOUS at 05:14

## 2022-01-10 RX ADMIN — ACETAMINOPHEN 650 MG: 325 TABLET, FILM COATED ORAL at 14:48

## 2022-01-10 RX ADMIN — ACETAMINOPHEN 650 MG: 325 TABLET, FILM COATED ORAL at 00:48

## 2022-01-10 RX ADMIN — ESCITALOPRAM OXALATE 20 MG: 20 TABLET ORAL at 08:59

## 2022-01-10 RX ADMIN — NICOTINE POLACRILEX 4 MG: 2 GUM, CHEWING ORAL at 05:48

## 2022-01-10 RX ADMIN — CLOPIDOGREL BISULFATE 75 MG: 75 TABLET ORAL at 09:00

## 2022-01-10 RX ADMIN — BUPROPION HYDROCHLORIDE 150 MG: 150 TABLET, FILM COATED, EXTENDED RELEASE ORAL at 08:59

## 2022-01-10 RX ADMIN — OMEPRAZOLE 20 MG: 20 CAPSULE, DELAYED RELEASE ORAL at 08:59

## 2022-01-10 RX ADMIN — ACETAMINOPHEN 650 MG: 325 TABLET, FILM COATED ORAL at 08:59

## 2022-01-10 RX ADMIN — ASPIRIN 81 MG: 81 TABLET, COATED ORAL at 08:59

## 2022-01-10 RX ADMIN — NICOTINE POLACRILEX 4 MG: 2 GUM, CHEWING ORAL at 14:10

## 2022-01-10 ASSESSMENT — ACTIVITIES OF DAILY LIVING (ADL)
ADLS_ACUITY_SCORE: 5
ADLS_ACUITY_SCORE: 7
ADLS_ACUITY_SCORE: 5
DIFFICULTY_COMMUNICATING: NO
DIFFICULTY_EATING/SWALLOWING: NO
ADLS_ACUITY_SCORE: 5
ADLS_ACUITY_SCORE: 7
WALKING_OR_CLIMBING_STAIRS_DIFFICULTY: NO
ADLS_ACUITY_SCORE: 7
ADLS_ACUITY_SCORE: 5
DRESSING/BATHING_DIFFICULTY: NO
TOILETING_ISSUES: NO
ADLS_ACUITY_SCORE: 7

## 2022-01-10 ASSESSMENT — MIFFLIN-ST. JEOR: SCORE: 1214.07

## 2022-01-10 NOTE — ED NOTES
Bed: ST  Expected date: 1/9/22  Expected time: 7:02 PM  Means of arrival: Ambulance  Comments:  832 62f stroke  ETA 1708

## 2022-01-10 NOTE — ED NOTES
[facility]    Stroke Telephone Note    I was called by Chandni Byrd on 01/09/22 regarding patient Linda Tomlin. The patient is a 62 year old female with PMHx of breast cancer who presents with word-finding difficulty and trouble walking.  These resolved while in the ED.  Head CT without acute pathology, possible DVA is seen in the right frontal lobe.  CTA no hemodynamically significant stenosis or proximal vessel occlusion.      Stroke Code Data (for stroke code without tele)  Stroke code activated 01/09/22 1917   Stroke provider first response  01/09/22 1919            Last known normal 01/09/22 1700        Time of discovery   (or onset of symptoms) 01/09/22 1700   Head CT read by Stroke Neuro Dr/Provider 01/09/22 2003   Was stroke code de-escalated? Yes      presence of contraindications for both intravenous and intra-arterial stroke treatments       Imaging Findings   As above    Thrombolytic Treatment   Not given due to minor/isolated/quickly resolving symptoms.    Endovascular Treatment  Not initiated due to absence of proximal vessel occlusion    Impression  Transient ischemic attack     Recommendations   - Neurochecks and Vital Signs every 4 hours   - Daily aspirin 325mg load mg for secondary stroke prevention  - Plavix (clopidogrel) 300 mg PO loading dose x 1  - Plavix (clopidogrel) 75 mg PO Daily  - Statin: tbd pending LDL  - MRI Brain with and without contrast  - 24-hour Telemetry  - Bedside Glucose Monitoring  - A1c, Lipid Panel  - PT/OT/SLP  - Stroke Education  - Euthermia, Euglycemia    My recommendations are based on the information provided over the phone by Linda Tomlin's in-person providers. They are not intended to replace the clinical judgment of her in-person providers. I was not requested to personally see or examine the patient at this time.    Miki Velasco MD, MS  Vascular Neurology  To page me or covering stroke neurology team  "member, click here: [facility]   Choose \"On Call\" tab at top, then search dropdown box for \"Neurology Adult\", select location, press Enter, then look for stroke/neuro ICU/telestroke.           "

## 2022-01-10 NOTE — PROGRESS NOTES
01/10/22 1043   Quick Adds   Type of Visit Initial PT Evaluation   Living Environment   People in home spouse;child(porfirio), adult   Current Living Arrangements house   Home Accessibility stairs to enter home;stairs within home   Number of Stairs, Main Entrance 2   Stair Railings, Main Entrance none   Number of Stairs, Within Home, Primary greater than 10 stairs   Stair Railings, Within Home, Primary railing on right side (ascending)   Living Environment Comments Lives in the lower level   Self-Care   Usual Activity Tolerance good   Current Activity Tolerance moderate   Regular Exercise No   Equipment Currently Used at Home none   Disability/Function   Difficulty Communicating no   Difficulty Eating/Swallowing no   Walking or Climbing Stairs Difficulty no   Dressing/Bathing Difficulty no   Toileting issues no   Fall history within last six months no   General Information   Onset of Illness/Injury or Date of Surgery 01/09/22   Referring Physician Shagufta Pham MD   Patient/Family Therapy Goals Statement (PT) Return home   Pertinent History of Current Problem (include personal factors and/or comorbidities that impact the POC) Pt admitted with slurred speech, dysarthria and unsteady gait and found to have a R occipital lobe punctate infarct. Now feels almost back to baseline. PMH: HTN, polycythemia, depression, smoker.   Existing Precautions/Restrictions fall   Weight-Bearing Status - LLE full weight-bearing   Weight-Bearing Status - RLE full weight-bearing   Cognition   Orientation Status (Cognition) oriented x 4   Affect/Mental Status (Cognition) WFL   Follows Commands (Cognition) WFL   Pain Assessment   Patient Currently in Pain Yes, see Vital Sign flowsheet  (mild headache)   Posture    Posture Not impaired   Range of Motion (ROM)   ROM Quick Adds ROM WFL   Strength   Strength Comments B hip flex 4/5, all others 5/5   Bed Mobility   Comment (Bed Mobility) Independent   Transfers   Transfer Safety Comments  Independent, very mild dizziness which quickly resolved   Gait/Stairs (Locomotion)   Comment (Gait/Stairs) Pt amb in hallway SBA with steady balance. Denies lightheadedness except when turning around quickly   Balance   Balance Comments Stable balance with mobility, stable standing balance in different positions   Sensory Examination   Sensory Perception patient reports no sensory changes   Clinical Impression   Criteria for Skilled Therapeutic Intervention yes, treatment indicated   PT Diagnosis (PT) Difficulty ambulating   Influenced by the following impairments Pain, dec strength, balance   Functional limitations due to impairments Difficulty ambulating   Clinical Presentation Stable/Uncomplicated   Clinical Presentation Rationale medically stable   Clinical Decision Making (Complexity) low complexity   Therapy Frequency (PT) One time eval and treatment only   Predicted Duration of Therapy Intervention (days/wks) 1 day   Planned Therapy Interventions (PT) patient/family education;progressive activity/exercise   Risk & Benefits of therapy have been explained evaluation/treatment results reviewed;care plan/treatment goals reviewed;risks/benefits reviewed;current/potential barriers reviewed;participants voiced agreement with care plan   PT Discharge Planning    PT Discharge Recommendation (DC Rec) home   PT Rationale for DC Rec Pt is near her baseline and will be safe to return home with family as prior. No further skilled PT needs at this time. Discharge from PT.   Total Evaluation Time   Total Evaluation Time (Minutes) 15

## 2022-01-10 NOTE — PLAN OF CARE
Pt A&O. VSS ex htn. On RA. Tele NSR. Mild headache, prn tylenol given. No other pain noted. Neuros intact ex mild headache. Up ind in room. Voiding. Tolerating diet. Echo completed. IV SL. . PT and speech consult completed. Neuro signed off. Ok for discharge home per provider. Patient smoker, requesting discharge smoking cessation. Patches and gum ordered. Pt gave full pack of sealed cigarettes to nurse to dispose of. Plans for discharge home this evening with a 30 day cardiac monitor.

## 2022-01-10 NOTE — H&P
[facility]  History and Physical  Hospitalist       Date of Admission:  1/9/2022    Assessment & Plan   Linda Tomlin is a delightful 62 year old female with hypertension, dyslipidemia, polycythemia, depression, tobacco abuse who was admitted to observation on 1/9/2022 for TIA symptoms.    Transient Ischemic Attack, probable  Atypical migraine, possible  Hypertension  Dyslipidemia  Presented with symptoms of slurred speech, dysarthria and unsteady gait. Brought to stabilization room and Code stroke called. Head CT normal. Head and neck CTA showed no focal stenosis or dissection. Symptoms are resolved while in the emergency department. Blood pressure medication prior to admission lisinopril 10 mg and metoprolol 25 mg daily. Admits her BP has been 150-160 systolic for quite some time. No history of migraine headaches.   -admit to observation with telemetry  -plavix 75 mg daily. 1st dose 300 mg given in ED.  -aspirin 81 mg daily. 1st dose 325 given in ED.   -permissive hypertension with prn hydralazine available  -for blood pressure medication after discharge: room to increase both ACE inhibitor and beta blocker dosing  -q 4 hour neuro checks  -stroke neurology consultation  -obtain brain MRI/MRA with and without contrast  -TTE  -check lipid panel, hemoglobin A1c  -likely will need statin, dose to be determined  -Physical therapy, occupational therapy  -stroke education  -Tobacco cessation encouragement  -consider zio patch at discharge    Addendum 6:20 AM  Reviewed brain MRI result.   Acute punctate infarct within the right occipitoparietal lobe. No associated hemorrhage. Stroke team still following.   Changed to inpatient status for acute ischemic stroke as demonstrated by MRI.     Tobacco abuse  Highly motivated to quit. Recommend indefinite cessation.  -Nicotine replacement therapy available    Depression, anxiety  Managed in the past with wellbutrin and lexapro.   -resumed  above    GERD  -daily PPI    Ruled Out COVID-19 infection  This patient was evaluated during a global COVID-19 pandemic, which necessitated consideration that the patient might be at risk for infection with the SARS-CoV-2 virus that causes COVID-19. Applicable protocols for evaluation were followed during the patient's care. Low suspicion for infection.  Fully vaccinated with Pfizer in May/June 2021 with booster 12/28/2021.  -follow-up COVID-19 PCR test result => negative    Hold all nonessential medications, vitamins, supplements given observation status.  Allow patient to take medications from his home supply if desired. These would need to be reviewed by pharmacy prior to administration.    DVT prophylaxis: Pneumatic Compression Devices and Ambulate every shift  Code Status: full code    Disposition:  2 nights for inpatient stroke evaluation and treatment.     We are operating under sub optimal conditions in the setting of a worldwide pandemic. This hospital and others in the metro area are at full capacity with limited inpatient and ICU bed availability. This has led to longer wait times in triage, the Emergency Department and outside hospitals and clinics who are referring patients. We are providing the best possible care with limited resources and staffing.     Shagufta Pham MD  Text Page    ~~~~~~~~~~~~~~~~~~~~~~~~~~~~~~~~~~~~~~~~~~~~~~~~~~~~~  Primary Care Physician   Ramses Murphy    Chief Complaint   Speech difficulties, unsteady gait    History is obtained from the patient and medical records.    History of Present Illness   Linda Tomlin is a very pleasant 62 year old female with hypertension, dyslipidemia, polycythemia, depression, tobacco abuse who presents with speech changes and unsteadiness while walking. She was home this afternoon and noticed that the front of her face had a strange fullness and pressure-like sensation, along with left-sided headache and neck muscle pain. She tried to rest  "and see if these got better. She next noticed her speech was slurred and tongue felt thick and \"wasn't cooperating\" when trying to talk. Recalls dizziness and unsteady balance when trying to walk on level floor. Came to ED as code stroke. By the time of evaluation she had significant improvement in her speech. Sent to CT scanner and by the time she returned from there (maybe 20 minutes or so) her gait was back to baseline. She denies any change in her medications but does note that when she checks her blood pressure at home \"It's always in the red\", which means systolic -170. Denies palpitations, chest pain or pressure, shortness of breath, limb pains. Her headache and left neck ache are significantly improved since this afternoon.     She is very motivated for lifestyle changes to prevent future stroke risk. She is looking forward to having her  return home from long term care facility where he has been since recovering from Covid-19 infection in March 2020. She has been fully vaccinated with Pfizer x 3 doses.     Case reviewed with Dr. Byrd from the Emergency Department. Labs and imaging reviewed.     Past Medical History    I have reviewed this patient's medical history and updated it with pertinent information if needed.   Past Medical History:   Diagnosis Date     Breast cancer (H) 2009    lumpectomy and radiation x 35     Diverticular disease of colon      Ovarian cyst        Past Surgical History   I have reviewed this patient's surgical history and updated it with pertinent information if needed.  Past Surgical History:   Procedure Laterality Date     COLON SURGERY  [id number]    HEMICOLECTOMY     COLONOSCOPY N/A 1/3/2019    Procedure: COMBINED COLONOSCOPY, SINGLE OR MULTIPLE BIOPSY/POLYPECTOMY BY BIOPSY;  Surgeon: Mane Jay MD;  Location:  GI     HAND SURGERY  1996    Carpal Tunnel Finger/hand Surgery     HYSTERECTOMY, PAP NO LONGER INDICATED  1994     LUMPECTOMY BREAST  2008     " SALPINGO OOPHORECTOMY,R/L/SIMON  01/172011    Salpingo Oophorectomy, RT/LT/SIMON     TONSILLECTOMY  1991       Prior to Admission Medications   Prior to Admission Medications   Prescriptions Last Dose Informant Patient Reported? Taking?   buPROPion (WELLBUTRIN XL) 150 MG 24 hr tablet 1/9/2022 at AM Self Yes Yes   Sig: Take 150 mg by mouth daily   escitalopram (LEXAPRO) 20 MG tablet 1/9/2022 at AM Self Yes Yes   Sig: Take 20 mg by mouth daily   lisinopril (ZESTRIL) 10 MG tablet 1/9/2022 at AM Self Yes Yes   Sig: Take 10 mg by mouth daily   metoprolol succinate ER (TOPROL-XL) 50 MG 24 hr tablet 1/9/2022 at AM Self Yes Yes   Sig: Take 50 mg by mouth daily   omeprazole (PRILOSEC) 20 MG CR capsule 1/9/2022 at AM Self No Yes   Sig: Take 1 capsule (20 mg) by mouth every morning (before breakfast) INDICATION: TO CONTROL REFLUX SYMPTOMS, No further refills till seen in the office.      Facility-Administered Medications: None     Allergies   Allergies   Allergen Reactions     Percocet [Oxycodone-Acetaminophen] Rash     Chantix [Varenicline Tartrate] Nausea and Vomiting     SEVERE VOMITING     Social History   I have reviewed this patient's social history and updated it with pertinent information if needed. Linda Tomlin  reports that she has been smoking cigarettes. She has a 10.00 pack-year smoking history. She has never used smokeless tobacco. She reports current alcohol use of about 0.8 - 1.7 standard drinks of alcohol per week. She reports that she does not use drugs.    Family History   I have reviewed this patient's family history and updated it with pertinent information if needed.   Family History   Problem Relation Age of Onset     Blood Disease Mother         HHT     Allergies Father      Breast Cancer Maternal Grandmother      Cancer Maternal Grandmother         mouth     Allergies Sister      Allergies Paternal Uncle      Cancer - colorectal Maternal Uncle      Allergies Paternal Aunt      Review of Systems   The  10 point Review of Systems is negative other than noted in the HPI or here.    Physical Exam   Temp: 98.1  F (36.7  C) Temp src: Oral BP: (!) 187/112 Pulse: 70   Resp: 21 SpO2: 95 % O2 Device: None (Room air)    Vital Signs with Ranges  Temp:  [98.1  F (36.7  C)-98.5  F (36.9  C)] 98.1  F (36.7  C)  Pulse:  [70-84] 70  Resp:  [13-24] 21  BP: (150-197)/() 187/112  SpO2:  [93 %-95 %] 95 %  148 lbs 12.97 oz    Constitutional: Alert, NAD, pleasant and interactive  Eyes: PERRL, sclerae anicteric  HEENT: mmm, atraumatic  Respiratory: Lungs CTAB, no wheezes or crackles  Cardiovascular: RRR, no murmurs  no LE edema  GI: soft, non-tender, nondistended  Skin/Integument: warm, dry, no acute rashes  Genitourinary: not examined  Musc: DEGROOT, normal limb strength x 4  Neuro: AOx3, no focal deficits, no tremors  Psych: not anxious, not confused      Data   Data reviewed today:  I personally reviewed: head CT normal. Head and neck CTA without any stenoses or dissection.   Recent Labs   Lab 01/09/22 1918   WBC 6.0   HGB 18.6*   MCV 96      INR 1.01      POTASSIUM 3.6   CHLORIDE 107   CO2 25   BUN 8   CR 0.72   ANIONGAP 8   HUMA 8.5   *       Imaging:  Recent Results (from the past 24 hour(s))   CT Head w/o Contrast    Addendum: 1/9/2022    Tubular mildly hyperdense structure right frontal lobe likely represents a developmental venous anomaly.      Narrative    EXAM: CT HEAD W/O CONTRAST  LOCATION: [facility]  DATE/TIME: 1/9/2022 7:22 PM    INDICATION: Left-sided weakness and facial droop  COMPARISON: None.  TECHNIQUE: Routine CT Head without IV contrast. Multiplanar reformats. Dose reduction techniques were used.    FINDINGS:  INTRACRANIAL CONTENTS: No intracranial hemorrhage, extraaxial collection, or mass effect.  No CT evidence of acute infarct. Normal parenchymal attenuation. Normal ventricles and sulci.     VISUALIZED ORBITS/SINUSES/MASTOIDS: No intraorbital abnormality.  Opacification right sphenoid sinus and posterior aspect of right ethmoid air cells. No middle ear or mastoid effusion.    BONES/SOFT TISSUES: No acute abnormality.      Impression    IMPRESSION:  1.  Normal head CT.    Results were called to Dr. Byrd at 1/9/2022 7:37 PM.   CTA Head Neck with Contrast    Addendum: 1/9/2022    Tubular structure right frontal lobe likely represents developmental venous anomaly.      Narrative    EXAM: CTA  HEAD NECK WITH CONTRAST  LOCATION: [facility]  DATE/TIME: 1/9/2022 7:25 PM    INDICATION: Left-sided weakness and facial droop.  COMPARISON: None.  CONTRAST: 70mL Isovue-370  TECHNIQUE: Head and neck CT angiogram with IV contrast. Axial helical CT images of the head and neck vessels obtained during the arterial phase of intravenous contrast administration. Axial 2D reconstructed images and multiplanar 3D MIP reconstructed   images of the head and neck vessels were performed by the technologist. Dose reduction techniques were used. All stenosis measurements made according to NASCET criteria unless otherwise specified.    FINDINGS:   HEAD CTA:  ANTERIOR CIRCULATION: No stenosis/occlusion, aneurysm, or high flow vascular malformation. Standard [place] anatomy.    POSTERIOR CIRCULATION: No stenosis/occlusion, aneurysm, or high flow vascular malformation. Dominant left and smaller right vertebral artery contribute to a normal basilar artery.     DURAL VENOUS SINUSES: Expected enhancement of the major dural venous sinuses.    NECK CTA:  RIGHT CAROTID: No measurable stenosis or dissection. Medial deviation of right ICA within the neck.    LEFT CAROTID: No measurable stenosis or dissection.    VERTEBRAL ARTERIES: No focal stenosis or dissection. Dominant left and smaller right vertebral arteries.    AORTIC ARCH: Classic aortic arch anatomy with no significant stenosis at the origin of the great vessels.    NONVASCULAR STRUCTURES: Unremarkable.       Impression    IMPRESSION:     HEAD CTA:   1.  Normal CTA [place].    NECK CTA:  1.  Normal neck CTA.    Results were called to Dr. Byrd at 1/9/2022 7:37 PM.

## 2022-01-10 NOTE — PROVIDER NOTIFICATION
"MD Notification     Notified Person: Hospitalist      Notified Person Name: Dr. Pham      Notification Date/Time: 6:18 AM    Notification Interaction: web page      Purpose of Notification: MRI results  show \"punctate acute infarct within the right occipitotemporal\" and \"large right frontal lobe developmental venous anomaly. \" Will need transfer orders to . Thanks      Orders Received: pending      Comments: n/a        "

## 2022-01-10 NOTE — PLAN OF CARE
PT: Patient seen for PT eval and one treatment. All goals met and pt is very close to her baseline. No further skilled PT needs at this time.    Physical Therapy Discharge Summary    Reason for therapy discharge:    All goals and outcomes met, no further needs identified.    Progress towards therapy goal(s). See goals on Care Plan in [facility] electronic health record for goal details.  Goals met    Therapy recommendation(s):    No further therapy is recommended.

## 2022-01-10 NOTE — ED PROVIDER NOTES
History   Chief Complaint:  Stroke Concern     The history is provided by the patient.      Linda Tomlin is a 62 year old female with history of hypertension who presents via EMS for evaluation of stroke concern. The patient and EMS reports that patient had a episode of sudden onset left sided facial droop and speech difficulty at 1730. These have since resolved but patient notes some mild trouble getting words out still but is able to do so successfully. She states that in the last hour or so and after EMS arrival she noticed some balance problems and trouble with her gait. She had good strength bilaterally and equally for EMS en route to ED.      Review of Systems   Neurological: Positive for facial asymmetry and speech difficulty.        Balance problems   All other systems reviewed and are negative.    Allergies:  Percocet [Oxycodone-Acetaminophen]  Chantix [Varenicline Tartrate]    Medications:  Atorvastatin  Bupropion  Citalopram  Escitalopram  Lisinopril  Metoprolol   Prilosec  Ventolin Inhaler  Vitamin C    Past Medical History:    Breast Cancer  Diverticular disease of colon  Ovarian Cyst  Plantar fibroma right  Ascorbic acid deficinecy  Hyperlipidemia LDL goal <130  Polycythemia  Vitamin B12 deficiency  Hypertension  Scurvy  Vitamin D deficiency  Moderate major depression     Past Surgical History:    Hemicolectomy  Colonoscopy  Carpal Tunnel Surgery  Hysterectomy  Lumpectomy Breast  Salpingo Oophorectomy Bilateral  Tonsillectomy     Family History:    Blood Disease (HHT)  Allergies x2    Social History:  The patient presents to the ED alone.    Physical Exam     No data found.    Physical Exam  General: Resting on the gurney, appears uncomfortable  Head:  The scalp, face, and head appear normal  Mouth/Throat: Mucus membranes are moist  CV:  Regular rate    Normal S1 and S2  No pathological murmur   Resp:  Breath sounds clear and equal bilaterally    Non-labored, no retractions or accessory muscle  use    No coarseness    No wheezing   GI:  Abdomen is soft, no rigidity    No tenderness to palpation  MS:  Normal motor assessment of all extremities.    Good capillary refill noted.  Skin:   No rash or lesions noted.  Neuro: Minimal word finding difficulty on initital evaluation, otherwise normal nad resolved on repeat exam.  Initial notably unsteady gait, resolved in repeat exam.  Strength and sensation intact x4.   Psych:  Awake. Alert.  Normal affect.      Appropriate interactions.      Emergency Department Course     ECG:  Indication: stroke sxs  Completed at 1941.  Read at 1941.   Normal sinus rhythm  Rate 77 bpm. RI interval 180. QRS duration 66. QT/QTc 402/454    Imaging:  CTA Head Neck with Contrast   Final Result   Addendum 1 of 1   Tubular structure right frontal lobe likely represents developmental    venous anomaly.      Final   IMPRESSION:       HEAD CTA:    1.  Normal CTA [place].      NECK CTA:   1.  Normal neck CTA.      Results were called to Dr. Byrd at 1/9/2022 7:37 PM.         CT Head w/o Contrast   Final Result   Addendum 1 of 1   Tubular mildly hyperdense structure right frontal lobe likely represents a    developmental venous anomaly.      Final   IMPRESSION:   1.  Normal head CT.      Results were called to Dr. Byrd at 1/9/2022 7:37 PM.      CT Head Perfusion w Contrast    (Results Pending)   Report per radiology     Laboratory:  Labs Ordered and Resulted from Time of ED Arrival to Time of ED Departure   BASIC METABOLIC PANEL - Abnormal       Result Value    Sodium 140      Potassium 3.6      Chloride 107      Carbon Dioxide (CO2) 25      Anion Gap 8      Urea Nitrogen 8      Creatinine 0.72      Calcium 8.5      Glucose 131 (*)     GFR Estimate >90     CBC WITH PLATELETS AND DIFFERENTIAL - Abnormal    WBC Count 6.0      RBC Count 5.72 (*)     Hemoglobin 18.6 (*)     Hematocrit 54.9 (*)     MCV 96      MCH 32.5      MCHC 33.9      RDW 13.4      Platelet Count 234      %  Neutrophils 59      % Lymphocytes 32      % Monocytes 8      % Eosinophils 1      % Basophils 0      % Immature Granulocytes 0      NRBCs per 100 WBC 0      Absolute Neutrophils 3.5      Absolute Lymphocytes 1.9      Absolute Monocytes 0.5      Absolute Eosinophils 0.1      Absolute Basophils 0.0      Absolute Immature Granulocytes 0.0      Absolute NRBCs 0.0     INR - Normal    INR 1.01     PARTIAL THROMBOPLASTIN TIME - Normal    aPTT 30     TROPONIN I - Normal    Troponin I High Sensitivity 5     COVID-19 VIRUS (CORONAVIRUS) BY PCR - Normal    SARS CoV2 PCR Negative     GLUCOSE MONITOR NURSING POCT       Emergency Department Course:  Reviewed:  I reviewed nursing notes, vitals, past medical history and care everywhere    Assessments:  1911 EMS arrival.     1911 I performed a physical exam of the patient. Findings as above.      Patient rechecked and updated. Plan of care discussed and questions answered.     Consults:     I spoke with Dr. Velasco of the [facility] service regarding patient's presentation, findings, and plan of care.    Interventions:    Medications   0.9% sodium chloride BOLUS (has no administration in time range)   iopamidol (ISOVUE-370) solution 120 mL (70 mLs Intravenous Given 1/9/22 1923)   CT scan flush (100 mLs Intravenous Given 1/9/22 1923)   aspirin (ASA) tablet 325 mg (325 mg Oral Given 1/9/22 2019)   clopidogrel (PLAVIX) tablet 300 mg (300 mg Oral Given 1/9/22 2019)       Disposition:  The patient was admitted to the hospital under the care of the hospitalist service.     Impression & Plan       Covid-19  Linda Tomlin was evaluated during a global COVID-19 pandemic, which necessitated consideration that the patient might be at risk for infection with the SARS-CoV-2 virus that causes COVID-19.   Applicable protocols for evaluation were followed during the patient's care.   COVID-19 was considered as part of the patient's evaluation. The plan for testing is: a test was obtained during this  visit.     Medical Decision Making:  Linda Tomlin is a 62 year old female presents for evaluation of speech dificulty and abnormal gait. The patient's symptoms started at 1730 and they are in the window for TPA.  They are a potential TPA candidate as they do not have any absolute contraindications to administration.  A Code Stroke was called, based on symptoms and timing.  The patient's symptoms are currently improved compared to when they first started. Per my evaluation and neurology recommendations, TPA was not given.  Other etiologies for the patient's symptoms, such as hypo/hyperglycemia, anemia, encephalitis, electrolyte abnormality, seizure, etc were considered, and evaluated as appropriate, however, TIA is more likely.  The patient is to be admitted to the hospitalist for further management and treatment.      Disposition:  The patient is admitted in improved condition.      Diagnosis:  Ataxia  Aphasia         Chandni Byrd MD  01/09/22 5224

## 2022-01-10 NOTE — PROGRESS NOTES
Observation goals  PRIOR TO DISCHARGE        Comments: List all goals to be met before discharge home       Free from ACUTE neuro deficits: MET   Testing complete: NOT MET  Other: n/a  Nurse to notify provider when observation goals have been met and patient is ready for discharge.

## 2022-01-10 NOTE — PHARMACY-ADMISSION MEDICATION HISTORY
Pharmacy Medication History  Admission medication history interview status for the 1/9/2022  admission is complete. See EPIC admission navigator for prior to admission medications     Location of Interview: Patient room  Medication history sources: Patient and Surescripts    Significant changes made to the medication list:  Removed: Lipitor, Celexa, metronidazole gel, mupirocin, Ventolin, Emergen-C  Changed: Toprol-XL 50mg BID --> daily    In the past week, patient estimated taking medication this percent of the time: greater than 90%    Additional medication history information:   None    Medication reconciliation completed by provider prior to medication history? No    Time spent in this activity: 15 mins    Prior to Admission medications    Medication Sig Last Dose Taking? Auth Provider   buPROPion (WELLBUTRIN XL) 150 MG 24 hr tablet Take 150 mg by mouth daily 1/9/2022 at AM Yes Reported, Patient   escitalopram (LEXAPRO) 20 MG tablet Take 20 mg by mouth daily 1/9/2022 at AM Yes Reported, Patient   lisinopril (ZESTRIL) 10 MG tablet Take 10 mg by mouth daily 1/9/2022 at AM Yes Reported, Patient   metoprolol succinate ER (TOPROL-XL) 50 MG 24 hr tablet Take 50 mg by mouth daily 1/9/2022 at AM Yes Unknown, Entered By History   omeprazole (PRILOSEC) 20 MG CR capsule Take 1 capsule (20 mg) by mouth every morning (before breakfast) INDICATION: TO CONTROL REFLUX SYMPTOMS, No further refills till seen in the office. 1/9/2022 at AM Yes Wilton Ureña MD       The information provided in this note is only as accurate as the sources available at the time of update(s)     Misa Sandoval PharmD

## 2022-01-10 NOTE — PLAN OF CARE
OT orders received and chart reviewed and discussed/educated pt on role of OT in acute care. Pt states she is back to her baseline and does not feel that she has any OT needs. Will complete OT orders.

## 2022-01-10 NOTE — CONSULTS
"[facility]    Stroke Consult Note    Reason for Consult: Stroke Code     Chief Complaint: Word finding difficulty, trouble walking    HPI  Linda Tomlin is a 62 year old female pmhx breast CA in 2009 s/p lumpectomy and radiation, in remission, smoker, presented in ED with 01/09 1700 onset word finding difficulty and difficulty walking, resolved in ED.     Imaging Findings  CTH normal   CTA H+N Normal  MRI brain punctate R parietal infarct    Thrombolytic Treatment   Not given due to minor/isolated/quickly resolving symptoms.     Endovascular Treatment  Not initiated due to absence of proximal vessel occlusion    Impression     Punctate RMCA infarct, small vessel vs cardioembolic, acute workup is done, will discharge on DAPT and statin for secondarty prevention, card monitor, and op follow-up.    Recommendations  - Neurochecks and Vital Signs every 4 hours   - Daily aspirin 81mg load mg for secondary stroke prevention  - Plavix (clopidogrel) 75 mg PO Daily for 21 days, stop after  - Atorvastatin 40mg at bedtime long term goal <70  - Telemetry  - Bedside Glucose Monitoring  - PT OT  - Stroke Education  - Euthermia, Euglycemia  - 30 day card monitor  - Follow-up op stroke neuro  - Discussed smoking cessation with patient    Patient Follow-up      Thank you for this consult. No further stroke evaluation is recommended, so we will sign off. Please contact us with any additional questions.    The Stroke Staff is Dr. Godoy.    Jose Maria Gifford MD  Vascular Neurology Fellow  To page me or covering stroke neurology team member, click here: [email or web address]   Choose \"On Call\" tab at top, then search dropdown box for \"Neurology Adult\", select location, press Enter, then look for stroke/neuro ICU/telestroke.    ______________________________________________________    Clinically Significant Risk Factors Present on Admission                      Past Medical History   Past Medical History:   Diagnosis " Date     Breast cancer (H) 2009    lumpectomy and radiation x 35     Diverticular disease of colon      Ovarian cyst      TIA (transient ischemic attack) 1/10/2022     Past Surgical History   Past Surgical History:   Procedure Laterality Date     COLON SURGERY  [id number]    HEMICOLECTOMY     COLONOSCOPY N/A 1/3/2019    Procedure: COMBINED COLONOSCOPY, SINGLE OR MULTIPLE BIOPSY/POLYPECTOMY BY BIOPSY;  Surgeon: Mane Jay MD;  Location:  GI     HAND SURGERY  1996    Carpal Tunnel Finger/hand Surgery     HYSTERECTOMY, PAP NO LONGER INDICATED  1994     LUMPECTOMY BREAST  2008     SALPINGO OOPHORECTOMY,R/L/SIMON  01/172011    Salpingo Oophorectomy, RT/LT/SIMON     TONSILLECTOMY  1991     Medications   Home Meds  Prior to Admission medications    Medication Sig Start Date End Date Taking? Authorizing Provider   aspirin (ASA) 81 MG EC tablet Take 1 tablet (81 mg) by mouth daily 1/11/22  Yes Shagufta Cole MD   atorvastatin (LIPITOR) 40 MG tablet Take 1 tablet (40 mg) by mouth daily 1/10/22  Yes Shagufta Cole MD   buPROPion (WELLBUTRIN XL) 150 MG 24 hr tablet Take 150 mg by mouth daily 7/26/21  Yes Reported, Patient   clopidogrel (PLAVIX) 75 MG tablet Take 1 tablet (75 mg) by mouth daily for 21 days 1/11/22 2/1/22 Yes Shagufta Cole MD   escitalopram (LEXAPRO) 20 MG tablet Take 20 mg by mouth daily 7/26/21  Yes Reported, Patient   lisinopril (ZESTRIL) 10 MG tablet Take 10 mg by mouth daily 7/26/21  Yes Reported, Patient   metoprolol succinate ER (TOPROL-XL) 50 MG 24 hr tablet Take 50 mg by mouth daily   Yes Unknown, Entered By History   nicotine (NICODERM CQ) 7 MG/24HR 24 hr patch Place 1 patch onto the skin every 24 hours 1/10/22  Yes Shagufta Cole MD   nicotine (NICORETTE) 4 MG gum Place 1 each (4 mg) inside cheek every hour as needed for smoking cessation 1/10/22  Yes Shagufta Cole MD   omeprazole (PRILOSEC) 20 MG CR capsule Take 1 capsule (20 mg) by mouth every morning (before  breakfast) INDICATION: TO CONTROL REFLUX SYMPTOMS, No further refills till seen in the office. 17  Yes Wilton Ureña MD       Scheduled Meds    aspirin  81 mg Oral Daily    Or     aspirin  81 mg Oral or NG Tube Daily     atorvastatin  40 mg Oral or NG Tube Daily at 8 pm     buPROPion  150 mg Oral Daily     clopidogrel  75 mg Oral Daily     escitalopram  20 mg Oral Daily     omeprazole  20 mg Oral QAM AC     polyethylene glycol  17 g Oral or NG Tube Daily     senna-docusate  1-2 tablet Oral or NG Tube BID       Infusion Meds      PRN Meds  acetaminophen **OR** acetaminophen **OR** acetaminophen, enalaprilat, melatonin, nicotine, ondansetron **OR** ondansetron, prochlorperazine **OR** prochlorperazine **OR** prochlorperazine    Allergies   Allergies   Allergen Reactions     Percocet [Oxycodone-Acetaminophen] Rash     Chantix [Varenicline Tartrate] Nausea and Vomiting     SEVERE VOMITING     Family History   Family History   Problem Relation Age of Onset     Blood Disease Mother         HHT     Allergies Father      Breast Cancer Maternal Grandmother      Cancer Maternal Grandmother         mouth     Allergies Sister      Allergies Paternal Uncle      Cancer - colorectal Maternal Uncle      Allergies Paternal Aunt      Social History   Social History     Tobacco Use     Smoking status: Current Every Day Smoker     Packs/day: 1.00     Years: 10.00     Pack years: 10.00     Types: Cigarettes     Last attempt to quit: 2012     Years since quittin.4     Smokeless tobacco: Never Used     Tobacco comment: 2 years now as of 15   Substance Use Topics     Alcohol use: Yes     Alcohol/week: 0.8 - 1.7 standard drinks     Types: 1 - 2 Standard drinks or equivalent per week     Drug use: No       Review of Systems   The 10 point Review of Systems is negative other than noted in the HPI or here.        PHYSICAL EXAMINATION  Temp:  [97.5  F (36.4  C)-98.5  F (36.9  C)] 98.1  F (36.7  C)  Pulse:  [70-84]  77  Resp:  [13-25] 18  BP: (144-197)/() 144/106  SpO2:  [93 %-97 %] 97 %     Neurologic  Mental Status:  alert, oriented x 3, follows commands, speech clear and fluent, naming and repetition normal  Cranial Nerves:  visual fields intact, PERRL, EOMI with normal smooth pursuit, facial sensation intact and symmetric, facial movements symmetric, hearing not formally tested but intact to conversation, palate elevation symmetric and uvula midline, no dysarthria, shoulder shrug strong bilaterally, tongue protrusion midline  Motor:  normal muscle tone and bulk, no abnormal movements, able to move all limbs spontaneously, strength 5/5 throughout upper and lower extremities, no pronator drift  Reflexes:  toes down-going  Sensory:  light touch sensation intact and symmetric throughout upper and lower extremities, no extinction on double simultaneous stimulation   Coordination:  normal finger-to-nose and heel-to-shin bilaterally without dysmetria, rapid alternating movements symmetric  Station/Gait:  deferred    Dysphagia Screen  Per Nursing    Stroke Scales    NIHSS  Interval discharge (01/10/22 1500)   Interval Comments     1a. Level of Consciousness 0-->Alert, keenly responsive   1b. LOC Questions 0-->Answers both questions correctly   1c. LOC Commands 0-->Performs both tasks correctly   2.   Best Gaze 0-->Normal   3.   Visual 0-->No visual loss   4.   Facial Palsy 0-->Normal symmetrical movements   5a. Motor Arm, Left 0-->No drift, limb holds 90 (or 45) degrees for full 10 secs   5b. Motor Arm, Right 0-->No drift, limb holds 90 (or 45) degrees for full 10 secs   6a. Motor Leg, Left 0-->No drift, leg holds 30 degree position for full 5 secs   6b. Motor Leg, right 0-->No drift, leg holds 30 degree position for full 5 secs   7.   Limb Ataxia 0-->Absent   8.   Sensory 0-->Normal, no sensory loss   9.   Best Language 0-->No aphasia, normal   10. Dysarthria 0-->Normal   11. Extinction and Inattention  0-->No abnormality    Total 0 (01/10/22 1500)       Modified Wells Score (Pre-morbid)  0-No deficits    Imaging  I personally reviewed all imaging; relevant findings per HPI.     Lab Results Data   CBC  Recent Labs   Lab 01/09/22 1918   WBC 6.0   RBC 5.72*   HGB 18.6*   HCT 54.9*        Basic Metabolic Panel    Recent Labs   Lab 01/10/22  1141 01/10/22  0611 01/10/22  0005 01/09/22 1918   NA  --   --   --  140   POTASSIUM  --   --   --  3.6   CHLORIDE  --   --   --  107   CO2  --   --   --  25   BUN  --   --   --  8   CR  --   --   --  0.72   * 112* 101* 131*   HUMA  --   --   --  8.5     Liver Panel  No results for input(s): PROTTOTAL, ALBUMIN, BILITOTAL, ALKPHOS, AST, ALT, BILIDIRECT in the last 168 hours.  INR    Recent Labs   Lab Test 01/09/22 1918   INR 1.01      Lipid Profile    Recent Labs   Lab Test 01/09/22  1918 10/20/16  0000 08/07/15  1531 03/24/14  1104 01/31/14  1049   CHOL 234* 229  --  252* 208*   HDL 68 80  --  95 73   * 113 139* 138* 116   TRIG 249* 179  --  94 98   CHOLHDLRATIO  --   --   --  2.6 2.9     A1C    Recent Labs   Lab Test 01/09/22 1918   A1C 5.4     Troponin I  No results for input(s): TROPONIN, GHTROP in the last 168 hours.       Stroke Code Data Data   Stroke Code Data  (for stroke code with tele)  Stroke code activated 01/09/22 1917   First stroke provider response 01/09/22 1919                     Last known normal 01/09/22   1700   Time of discovery  (or onset of symptoms)  01/09/22   1700   Head CT read by Stroke Neuro Dr/Provider 01/09/22 2003   Was stroke code de-escalated? Yes      presence of contraindications for both intravenous and intra-arterial stroke treatments

## 2022-01-10 NOTE — DISCHARGE INSTRUCTIONS
Pt has two belongings bags in room         Your risk factors for stroke or TIA (transient ischemic attack):    Your Risk Factors Your Results Normal Ranges   High blood pressure BP Readings from Last 1 Encounters:   01/10/22 (!) 144/106    Less than 120/80   Cholesterol              Total Lab Results   Component Value Date    CHOL 234 01/09/2022    CHOL 229 10/20/2016      Less than 150    Triglycerides   Lab Results   Component Value Date    TRIG 249 01/09/2022    TRIG 179 10/20/2016    Less than 150   LDL Lab Results   Component Value Date     01/09/2022     10/20/2016       Less than 70   HDL Lab Results   Component Value Date    HDL 68 01/09/2022    HDL 80 10/20/2016            Greater than 40 (men)  Greater than 50 (women)   Diabetes Recent Labs   Lab 01/10/22  1141   *    Fasting blood glucose    Smoking/tobacco use  Quit smoking and tobacco   Overweight  Lose 1-2 pounds a week   Lack of exercise  30 minutes moderate activity each day       Other risk factors include high blood pressure and smoking. A nicotine patch has been ordered for you to use daily; additional nicotine gum has been ordered as needed for cravings      Stroke warning signs and symptoms - CALL 911 right away for:  - Sudden numbness or weakness in the face, arm or leg (often on one side of the body).  - Sudden confusion or trouble understanding what is going on.  - Sudden blurred or decreased vision in one or both eyes.  - Sudden trouble speaking, loss of balance, dizziness or problems with coordination.  - Sudden, severe headache for no reason.  - Fainting or seizures.  - Symptoms may go away then come back suddenly.

## 2022-01-10 NOTE — PROGRESS NOTES
01/10/22 0835   General Information   Onset of Illness/Injury or Date of Surgery 01/09/22   Referring Physician Dr. Pham   Patient/Family Therapy Goal Statement (SLP) To eat and drink.    Pertinent History of Current Problem Linda Tomlin is a delightful 62 year old female with hypertension, dyslipidemia, polycythemia, depression, tobacco abuse who was admitted to observation on 1/9/2022 for TIA symptoms. MRI revealed  Acute punctate infarct within the right occipitoparietal lobe.   General Observations Pleasant slight dysarthria and word finding deficits.    Past History of Dysphagia No documented history found.    Type of Evaluation   Type of Evaluation Swallow Evaluation   Oral Motor   Oral Musculature generally intact   Structural Abnormalities none present   Mucosal Quality good   Dentition (Oral Motor)   Dentition (Oral Motor) natural dentition;adequate dentition   Facial Symmetry (Oral Motor)   Facial Symmetry (Oral Motor) WNL   Lip Function (Oral Motor)   Lip Range of Motion (Oral Motor) WNL   Tongue Function (Oral Motor)   Tongue ROM (Oral Motor) WNL   Tongue Strength (Oral Motor) WFL   Tongue Coordination/Speed (Oral Motor) WNL   Jaw Function (Oral Motor)   Jaw Function (Oral Motor) WNL   Cough/Swallow/Gag Reflex (Oral Motor)   Soft Palate/Velum (Oral Motor) WNL   Volitional Throat Clear/Cough (Oral Motor) WNL   Volitional Swallow (Oral Motor) WNL   Vocal Quality/Secretion Management (Oral Motor)   Vocal Quality (Oral Motor) WNL   General Swallowing Observations   Current Diet/Method of Nutritional Intake (General Swallowing Observations, NIS) NPO   Respiratory Support (General Swallowing Observations) none   Swallowing Evaluation Clinical swallow evaluation   Clinical Swallow Evaluation   Feeding Assistance no assistance needed   Clinical Swallow Evaluation Textures Trialed thin liquids;pureed;solid foods   Clinical Swallow Eval: Thin Liquid Texture Trial   Mode of Presentation, Thin Liquids  cup;straw;self-fed   Volume of Liquid or Food Presented 4 oz of water   Oral Phase of Swallow WFL   Pharyngeal Phase of Swallow intact   Diagnostic Statement No Sx of aspiration via the cup or straw.    Clinical Swallow Evaluation: Puree Solid Texture Trial   Mode of Presentation, Puree self-fed;spoon   Volume of Puree Presented 2 oz of pudding   Oral Phase, Puree WFL   Pharyngeal Phase, Puree intact   Clinical Swallow Evaluation: Solid Food Texture Trial   Mode of Presentation self-fed   Volume Presented 1 cracker   Oral Phase WFL   Pharyngeal Phase intact   Diagnostic Statement No Sx of aspiration.    Swallowing Recommendations   Diet Consistency Recommendations regular diet;thin liquids (level 0)   Supervision Level for Intake patient independent   Mode of Delivery Recommendations bolus size, small;slow rate of intake   Postural Recommendations none   Swallowing Maneuver Recommendations alternate food and liquid intake   Monitoring/Assistance Required (Eating/Swallowing) monitor for cough or change in vocal quality with intake   Recommended Feeding/Eating Techniques (Swallow Eval) patient is independent, no specific recommendations   Medication Administration Recommendations, Swallowing (SLP) Whole with water as tolerated.    Instrumental Assessment Recommendations instrumental evaluation not recommended at this time   SLP Therapy Assessment/Plan   Criteria for Skilled Therapeutic Interventions Met (SLP Eval) no problems identified which require skilled intervention   SLP Diagnosis Functional swallow   Therapy Frequency (SLP Eval) evaluation only   Comment, Therapy Assessment/Plan (SLP) Patient presents with an intact swallow function. Oral motor examination was normal. She was able to tolerate thin, pureed and solid textures without any Sx of aspiration. Recommend: 1. Regular diet and thin liquids. 2. Upright, small bites/sips and alternate liquids/solids as needed. No further skilled intervention indicated for  swallowing. 3. OP speech therapy if speech/language deficits still present.    Therapy Plan Review/Discharge Plan (SLP)   Therapy Plan Review (SLP) evaluation/treatment results reviewed;care plan/treatment goals reviewed;risks/benefits reviewed;current/potential barriers reviewed;participants voiced agreement with care plan;participants included;patient   SLP Discharge Planning    SLP Discharge Recommendation (DC Rec) home with outpatient speech therapy   SLP Rationale for DC Rec Patient's swallow is intact. Minimal speech/language deficits noted and would benefit from OP speech therapy if present at discharge.    SLP Brief overview of current status  Intact swallow function regular diet and thin liquids.     Total Evaluation Time   Total Evaluation Time (Minutes) 15

## 2022-01-10 NOTE — PROGRESS NOTES
RECEIVING UNIT ED HANDOFF REVIEW    ED Nurse Handoff Report was reviewed by: Sushant William RN on January 9, 2022 at 11:13 PM

## 2022-01-10 NOTE — ED TRIAGE NOTES
Slurred speech and left sided weakness that started at 1730, some symptoms resolved but feels dizzy and unsteady on her feet.

## 2022-01-10 NOTE — ED NOTES
Received report for continuation of care.   Patient endorses moderate headache on L side. Denies dizziness at rest. Ambulatory to the bathroom independently, but does endorse some dizziness with ambulation. All extremities strong and equal. Denies any decreased sensation, numbness, tingling.     Patient was able to drink 3oz of water without difficulty, passed swallow test.

## 2022-01-10 NOTE — ED NOTES
[facility]  ED Nurse Handoff Report    ED Chief complaint: Stroke Symptoms      ED Diagnosis:   Final diagnoses:   None       Code Status: Full Code    Allergies:   Allergies   Allergen Reactions     Percocet [Oxycodone-Acetaminophen] Rash     Chantix [Varenicline Tartrate] Nausea and Vomiting     SEVERE VOMITING       Patient Story: Patient had an episode of left sided weakness and facial droop at 1730 followed by some dizziness. Symptoms had mostly resolved prior to arrival. Patient also had one glass of wine prior to arrival to ED.  Focused Assessment:  AOx3, VSS, hypertensive. Patient c/o slight dizziness and speech difficulty.    Treatments and/or interventions provided: CODE STROKE   Patient's response to treatments and/or interventions:   Medications   iopamidol (ISOVUE-370) solution 120 mL (70 mLs Intravenous Given 1/9/22 1923)   CT scan flush (100 mLs Intravenous Given 1/9/22 1923)       To be done/followed up on inpatient unit:  continue to monitor    Does this patient have any cognitive concerns?: none    Activity level - Baseline/Home:  Independent  Activity Level - Current:   Independent    Patient's Preferred language: English   Needed?: No    Isolation: None  Infection: Not Applicable  Patient tested for COVID 19 prior to admission: YES  Bariatric?: No    Vital Signs:   Vitals:    01/09/22 1917 01/09/22 1930 01/09/22 1935 01/09/22 1945   BP: (!) 188/115 (!) 169/99  (!) 166/118   Pulse: 81 80  80   Resp: 18   17   Temp:   98.5  F (36.9  C)    SpO2: 94%   93%   Weight:           Cardiac Rhythm:     Was the PSS-3 completed:   Yes  What interventions are required if any?               Family Comments: no one present in ED  OBS brochure/video discussed/provided to patient/family: N/A              Name of person given brochure if not patient: n/a              Relationship to patient: n/a    For the majority of the shift this patient's behavior was Green.   Behavioral  interventions performed were n/a.    ED NURSE PHONE NUMBER: [phone]

## 2022-01-10 NOTE — DISCHARGE SUMMARY
[facility]  Hospitalist Discharge Summary      Date of Admission:  1/9/2022  Date of Discharge:  1/10/2022  Discharging Provider: Shagufta Cole MD    Discharge Diagnoses   1. TIA, acute ischemic stroke of right occipitoparietal lobe  2. Essential hypertension  3. Hyperlipidemia  4. Tobacco use disorder  5. GERD  6. Major recurrent depressive disorder with anxiety    Follow-ups Needed After Discharge   1. Follow up with primary care provider at next available appointment for blood pressure check  2. Follow up with Stroke Neurology as scheduled    Discharge Disposition   Discharged to home  Condition at discharge: Stable    Hospital Course   Linda Tomlin is a 62 year old female with hx of HTN and tobacco use who was admitted on 1/9/2021 for a TIA. She presented to the ED with slurred speech and gait instability which resolved while in the ED. Head CT and head/neck CTA on arrival were initially normal, but brain MRI revealed an acute punctate infarct within the right occipitoparietal lobe. Stroke Neurology and PT/OT/SLP were consulted on admission.    She underwent the following stroke work-up during this hospitalization:  * Lipid panel: Tchol 234, , , HDL 68  * A1c: 5.4, troponin normal  * TTE (1/10): LVEF 55-60%, no WMA, normal atrial size; normal valves; negative bubble study    - She will be discharged with ASA 81 mg and Plavix 75 mg daily x21 days, then aspirin 325 mg daily alone thereafter  - She was initiated on atorvastatin 40 mg daily at discharge  - She will be discharged with a 30 day event monitor  - PTA metoprolol and lisinopril were initially held for permissive hypertension, and will be resumed at discharge; recommend blood pressure check at next follow up appointment  - Patient has been strongly advised to quit smoking; she seems highly motivated. A nicotine patch (7 mg/24h) and nicotine gum were ordered at discharge  - She has no residual deficits and  has been cleared by PT/OT/SLP    Consultations This Hospital Stay   1. NEUROLOGY IP CONSULT  2. PHYSICAL THERAPY ADULT IP CONSULT  3. OCCUPATIONAL THERAPY ADULT IP CONSULT  4. SPEECH LANGUAGE PATH ADULT IP CONSULT    Code Status   Full Code    Time Spent on this Encounter   I, Shagufta Cole MD, personally saw the patient today and spent 35 minutes discharging this patient.       Shagufta Cole MD  [facility] EXTENDED RECOVERY AND SHORT STAY  [address] [facility] [address] [address]  Phone: [phone]  ______________________________________________________________________    Physical Exam   Vital Signs: Temp: 98.1  F (36.7  C) Temp src: Oral BP: (!) 144/106 Pulse: 77   Resp: 18 SpO2: 97 % O2 Device: None (Room air)    Weight: 149 lbs 14.4 oz    Constitutional: Appears comfortable, NAD  HEENT: Sclera white, conjunctiva clear, EOMI, MMM  Respiratory: Breathing non-labored. Lungs CTAB - no wheezes/crackles/rhonchi  Cardiovascular: Heart RRR, no m/r/g. No pedal edema.   GI: +BS. Abd soft/NT  Skin: Warm and dry. No rash.  Musculoskeletal: Normal muscle bulk and tone  Neurologic: Alert and appropriate. DEGROOT  Psychiatric: Calm and cooperative    Primary Care Physician   Ramses Murphy    Discharge Orders       Significant Results and Procedures   Most Recent 3 Troponin's:No lab results found.  Most Recent Hemoglobin A1c:Recent Labs   Lab Test 01/09/22 1918   A1C 5.4   ,   Results for orders placed or performed during the hospital encounter of 01/09/22   CT Head w/o Contrast    Addendum: 1/9/2022    Tubular mildly hyperdense structure right frontal lobe likely represents a developmental venous anomaly.      Narrative    EXAM: CT HEAD W/O CONTRAST  LOCATION: [facility]  DATE/TIME: 1/9/2022 7:22 PM    INDICATION: Left-sided weakness and facial droop  COMPARISON: None.  TECHNIQUE: Routine CT Head without IV contrast. Multiplanar reformats. Dose reduction techniques  were used.    FINDINGS:  INTRACRANIAL CONTENTS: No intracranial hemorrhage, extraaxial collection, or mass effect.  No CT evidence of acute infarct. Normal parenchymal attenuation. Normal ventricles and sulci.     VISUALIZED ORBITS/SINUSES/MASTOIDS: No intraorbital abnormality. Opacification right sphenoid sinus and posterior aspect of right ethmoid air cells. No middle ear or mastoid effusion.    BONES/SOFT TISSUES: No acute abnormality.      Impression    IMPRESSION:  1.  Normal head CT.    Results were called to Dr. Byrd at 1/9/2022 7:37 PM.   CTA Head Neck with Contrast    Addendum: 1/9/2022    Tubular structure right frontal lobe likely represents developmental venous anomaly.      Narrative    EXAM: CTA  HEAD NECK WITH CONTRAST  LOCATION: [facility]  DATE/TIME: 1/9/2022 7:25 PM    INDICATION: Left-sided weakness and facial droop.  COMPARISON: None.  CONTRAST: 70mL Isovue-370  TECHNIQUE: Head and neck CT angiogram with IV contrast. Axial helical CT images of the head and neck vessels obtained during the arterial phase of intravenous contrast administration. Axial 2D reconstructed images and multiplanar 3D MIP reconstructed   images of the head and neck vessels were performed by the technologist. Dose reduction techniques were used. All stenosis measurements made according to NASCET criteria unless otherwise specified.    FINDINGS:   HEAD CTA:  ANTERIOR CIRCULATION: No stenosis/occlusion, aneurysm, or high flow vascular malformation. Standard [place] anatomy.    POSTERIOR CIRCULATION: No stenosis/occlusion, aneurysm, or high flow vascular malformation. Dominant left and smaller right vertebral artery contribute to a normal basilar artery.     DURAL VENOUS SINUSES: Expected enhancement of the major dural venous sinuses.    NECK CTA:  RIGHT CAROTID: No measurable stenosis or dissection. Medial deviation of right ICA within the neck.    LEFT CAROTID: No measurable stenosis or  dissection.    VERTEBRAL ARTERIES: No focal stenosis or dissection. Dominant left and smaller right vertebral arteries.    AORTIC ARCH: Classic aortic arch anatomy with no significant stenosis at the origin of the great vessels.    NONVASCULAR STRUCTURES: Unremarkable.      Impression    IMPRESSION:     HEAD CTA:   1.  Normal CTA [place].    NECK CTA:  1.  Normal neck CTA.    Results were called to Dr. Byrd at 1/9/2022 7:37 PM.     MR Brain w/o & w Contrast    Narrative    EXAM: MR BRAIN W/O and W CONTRAST  LOCATION: [facility]  DATE/TIME: 1/10/2022 4:49 AM    INDICATION: Transient ischemic attack (TIA).  COMPARISON: 1/9/2022  CONTRAST: 6 mL Gadavist  TECHNIQUE: Routine multiplanar multisequence head MRI without and with intravenous contrast.    FINDINGS:  INTRACRANIAL CONTENTS: Punctate acute infarct within the right occipitotemporal (image 20 series 6.2). No mass, acute hemorrhage, or extra-axial fluid collections. Scattered nonspecific T2/FLAIR hyperintensities within the cerebral white matter most   consistent with mild chronic microvascular ischemic change. Large right frontal lobe developmental venous anomaly. Normal ventricles and sulci. Normal position of the cerebellar tonsils. No pathologic contrast enhancement.    SELLA: No abnormality accounting for technique.    OSSEOUS STRUCTURES/SOFT TISSUES: Normal marrow signal. The major intracranial vascular flow voids are maintained.     ORBITS: No abnormality accounting for technique.     SINUSES/MASTOIDS: Mixed-attenuation polypoid opacification of the sphenoid sinus and right posterior ethmoid air cells. There is an increased T1 and diminished T2 signal polypoid lesion within the right sphenoid sinus extending into the right posterior   ethmoid air cells measuring approximately 3 x 1.4 cm in greatest axial dimensions. This could simply reflect a proteinaceous mucosal disease of the sinuses, however, a polyp cannot be  entirely excluded. Left mastoid effusion.       Impression    IMPRESSION:  1.  Acute punctate infarct within the right occipitoparietal lobe. No associated hemorrhage.  2.  Chronic senescent and presumed microvascular ischemic changes.  3.  Paranasal sinus disease, as above. Heterogeneous signal polypoid lesion within the right sphenoid sinus and posterior ethmoid air cells could reflect a polyp versus proteinaceous lobulated paranasal sinus disease. Recommend follow-up ENT consultation   on a nonemergent basis for further assessment.   Echo Limited with Bubble Study     Value    LVEF  55-60%    [facility]    [id number]  [id number]  [id number]^KAYLEE^JUSTINA^MARYURI     [facility]  Echocardiography Laboratory  [address] [place] [id number]     Name: SHAMEKA CHENG  MRN: [id number]  : 1960  Study Date: 01/10/2022 07:47 AM  Age: 62 yrs  Gender: Female  Patient Location: [facility]  Reason For Study: TIA  Ordering Physician: JUSTINA PAGE  Referring Physician: Ramses Murphy  Performed By: Radha Yanez     BSA: 1.7 m2  Height: 63 in  Weight: 148 lb  HR: 69  BP: 162/99 mmHg  ______________________________________________________________________________  Procedure  Limited Portable Bubble Echo Adult.  ______________________________________________________________________________  Interpretation Summary     A contrast injection (Bubble Study) was performed that was negative for flow  across the interatrial septum.  The visual ejection fraction is 55-60%.  The right ventricle is normal in size and function.  There is no pericardial effusion.  The inferior vena cava was normal in size with preserved respiratory  variability.  ______________________________________________________________________________  Left Ventricle  The left ventricle is normal in structure, function and size. The visual  ejection fraction is 55-60%.     Right Ventricle  The right ventricle is normal in  size and function.     Atria  Normal left atrial size. Right atrial size is normal. A contrast injection  (Bubble Study) was performed that was negative for flow across the interatrial  septum.     Mitral Valve  The mitral valve is normal in structure and function. There is physiologic  mitral regurgitation. There is no mitral valve stenosis.     Tricuspid Valve  The tricuspid valve is normal in structure and function. There is trace  tricuspid regurgitation. Right ventricular systolic pressure could not be  approximated due to inadequate tricuspid regurgitation.     Aortic Valve  The aortic valve is normal in structure and function. No aortic regurgitation  is present. No aortic stenosis is present.     Pulmonic Valve  The pulmonic valve is not well seen, but is grossly normal.     Vessels  The inferior vena cava was normal in size with preserved respiratory  variability.     Pericardium  There is no pericardial effusion.     ______________________________________________________________________________  Report approved by: Brittany Estrella 01/10/2022 10:25 AM     ______________________________________________________________________________            Discharge Medications   Current Discharge Medication List      START taking these medications    Details   aspirin (ASA) 81 MG EC tablet Take 1 tablet (81 mg) by mouth daily  Qty: 30 tablet, Refills: 3    Associated Diagnoses: TIA (transient ischemic attack)      atorvastatin (LIPITOR) 40 MG tablet Take 1 tablet (40 mg) by mouth daily  Qty: 30 tablet, Refills: 0    Associated Diagnoses: TIA (transient ischemic attack)      clopidogrel (PLAVIX) 75 MG tablet Take 1 tablet (75 mg) by mouth daily for 21 days  Qty: 21 tablet, Refills: 0    Associated Diagnoses: TIA (transient ischemic attack)      nicotine (NICODERM CQ) 7 MG/24HR 24 hr patch Place 1 patch onto the skin every 24 hours  Qty: 14 patch, Refills: 3    Associated Diagnoses: Tobacco use disorder      nicotine  (NICORETTE) 4 MG gum Place 1 each (4 mg) inside cheek every hour as needed for smoking cessation  Qty: 100 each, Refills: 0    Associated Diagnoses: Tobacco use disorder         CONTINUE these medications which have NOT CHANGED    Details   buPROPion (WELLBUTRIN XL) 150 MG 24 hr tablet Take 150 mg by mouth daily      escitalopram (LEXAPRO) 20 MG tablet Take 20 mg by mouth daily      lisinopril (ZESTRIL) 10 MG tablet Take 10 mg by mouth daily      metoprolol succinate ER (TOPROL-XL) 50 MG 24 hr tablet Take 50 mg by mouth daily      omeprazole (PRILOSEC) 20 MG CR capsule Take 1 capsule (20 mg) by mouth every morning (before breakfast) INDICATION: TO CONTROL REFLUX SYMPTOMS, No further refills till seen in the office.  Qty: 30 capsule, Refills: 0    Associated Diagnoses: Hyperlipidemia LDL goal <130           Allergies   Allergies   Allergen Reactions     Percocet [Oxycodone-Acetaminophen] Rash     Chantix [Varenicline Tartrate] Nausea and Vomiting     SEVERE VOMITING

## 2022-01-10 NOTE — PLAN OF CARE
A&OX4, VSS on RA. Denies pain. N&V. No neuro deficits noted. All discharge meds and instructions reviewed with pt and mother, both verbalized understanding. All belongings returned to pt. Stable at time of discharge with event monitor in place.

## 2022-01-10 NOTE — PROGRESS NOTES
Observation goals  PRIOR TO DISCHARGE        Comments: List all goals to be met before discharge home       Free from ACUTE neuro deficits: MET   Testing complete: NOT MET. Echo pending, MRI completed   Other: n/a  Nurse to notify provider when observation goals have been met and patient is ready for discharge.

## 2022-01-10 NOTE — PLAN OF CARE
Shift: [id number]    Orientation: A&Ox4   Activity: SBA, lightheadedness while ambulating   Vitals Signs: Hypertensive all other VSS on RA. Orthostatics negative   Neuro: Intact, q4h   Cardiac: Tele NSR , denies chest pain   Respiratory: LS clear, denies SOB   GI/: Continent, denies constipation. No BM. Voiding adequately   Diet/Appetite: Passed swallow test   Pain: C/o headache, prn tylenol po given   Skin: WDL  Lines: PIV SL   Consults/Imaging: Echo pending, PT/OT/Speech consults pending   Discharge Plan: 1-2 days  Other: MRI results findings show acute ischemic stroke. NIH assessed again total score-0, no change. Will transfer to 73. prn nicotine available

## 2022-01-11 ENCOUNTER — PATIENT OUTREACH (OUTPATIENT)
Dept: CARE COORDINATION | Facility: CLINIC | Age: 62
End: 2022-01-11
Payer: COMMERCIAL

## 2022-01-11 DIAGNOSIS — Z71.89 OTHER SPECIFIED COUNSELING: ICD-10-CM

## 2022-01-11 NOTE — PROGRESS NOTES
Clinic Care Coordination Contact  RUST/Voicemail       Clinical Data: Care Coordinator Outreach  Outreach attempted x 1.  Left message on patient's voicemail with call back information and requested return call.  Plan: Care Coordinator will try to reach patient again in 1-2 business days.      Elisa Toscano  Care Transitions Assistant  [facility]

## 2022-01-11 NOTE — PLAN OF CARE
Speech Language Therapy Discharge Summary    Reason for therapy discharge:    Discharged to home with outpatient therapy.    Progress towards therapy goal(s). See goals on Care Plan in [facility] electronic health record for goal details.  Goals partially met.  Barriers to achieving goals:   discharge from facility.    Therapy recommendation(s):    Continued therapy is recommended.  Rationale/Recommendations:  Complete an OP speech/language evaluation if deficits still present. Discharged on a regular diet and thin liquids.

## 2022-01-12 ENCOUNTER — TELEPHONE (OUTPATIENT)
Dept: NEUROLOGY | Facility: CLINIC | Age: 62
End: 2022-01-12
Payer: COMMERCIAL

## 2022-01-12 NOTE — PROGRESS NOTES
Clinic Care Coordination Contact  Community Health Worker Initial Outreach    CHW Initial Information Gathering:  Referral Source: IP Report  Preferred Hospital: [facility]  [phone]  Current living arrangement:: I live in a private home with family  Type of residence:: Private home - stairs  Community Resources: None  Supplies used at home:: None  Equipment Currently Used at Home: none  Informal Support system:: Family,Parent  Transportation means:: Regular car  CHW Additional Questions  MyChart active?: Yes    Patient accepts CC: Yes. Patient scheduled for assessment with Nargis Leach RN on 1/14/2022 at 1pm. Patient noted desire to discuss the help in quiting smoking, Nutritional support, help with her  that is coming home from [facility]..     Elisa Toscano  Care Transitions Assistant  [facility] [place]

## 2022-01-12 NOTE — PROGRESS NOTES
Clinic Care Coordination Contact  [facility]: Post-Discharge Note  SITUATION                                                      Admission:    Admission Date: 01/09/22   Reason for Admission: TIA, acute ischemic stroke of right occipitoparietal lobe  Discharge:   Discharge Date: 01/10/22  Discharge Diagnosis: TIA, acute ischemic stroke of right occipitoparietal lobe    BACKGROUND                                                      62 year old female with hx of HTN and tobacco use who was admitted on 1/9/2021 for a TIA. She presented to the ED with slurred speech and gait instability which resolved while in the ED. Head CT and head/neck CTA on arrival were initially normal, but brain MRI revealed an acute punctate infarct within the right occipitoparietal lobe. Stroke Neurology and PT/OT/SLP were consulted on admission.     She underwent the following stroke work-up during this hospitalization:  * Lipid panel: Tchol 234, , , HDL 68  * A1c: 5.4, troponin normal  * TTE (1/10): LVEF 55-60%, no WMA, normal atrial size; normal valves; negative bubble study     - She will be discharged with ASA 81 mg and Plavix 75 mg daily x21 days, then aspirin 325 mg daily alone thereafter  - She was initiated on atorvastatin 40 mg daily at discharge  - She will be discharged with a 30 day event monitor  - PTA metoprolol and lisinopril were initially held for permissive hypertension, and will be resumed at discharge; recommend blood pressure check at next follow up appointment  - Patient has been strongly advised to quit smoking; she seems highly motivated. A nicotine patch (7 mg/24h) and nicotine gum were ordered at discharge  - She has no residual deficits and has been cleared by PT/OT/SLP          ASSESSMENT      Enrollment  Primary Care Care Coordination Status: Potential    Discharge Assessment  How are you doing now that you are home?: counting my blessings  How are your symptoms? (Red Flag symptoms escalate  to triage hotline per guidelines): Improved  Do you feel your condition is stable enough to be safe at home until your provider visit?: Yes  Does the patient have their discharge instructions? : Yes  Does the patient have questions regarding their discharge instructions? : No  Were you started on any new medications or were there changes to any of your previous medications? : Yes  Does the patient have all of their medications?: Yes  Do you have questions regarding any of your medications? : No  Do you have all of your needed medical supplies or equipment (DME)?  (i.e. oxygen tank, CPAP, cane, etc.): Yes  Discharge follow-up appointment scheduled within 14 calendar days? : Yes  Discharge Follow Up Appointment Date: 01/13/22  Discharge Follow Up Appointment Scheduled with?: Primary Care Provider                PLAN                                                      1. Outpatient Plan:  Follow up with primary care provider at next available appointment for blood pressure check  2. Follow up with Stroke Neurology as scheduled       Future Appointments   Date Time Provider Department Center   1/13/2022 10:00 AM Eliceo Doran MD [facility]    1/14/2022  1:00 PM  [facility]   3/11/2022  1:15 PM Stefan Rdz DO [facility]         For any urgent concerns, please contact our 24 hour nurse triage line: [phone] ([phone])         Agata Toscano MA

## 2022-01-12 NOTE — TELEPHONE ENCOUNTER
LMTC- need to schedule with general neurology per Care Coordination Stroke team staff message. Schedule with first available ideally 6-8 weeks from 1-12-22  
yes

## 2022-01-13 ENCOUNTER — OFFICE VISIT (OUTPATIENT)
Dept: INTERNAL MEDICINE | Facility: CLINIC | Age: 62
End: 2022-01-13
Payer: COMMERCIAL

## 2022-01-13 VITALS
BODY MASS INDEX: 26.08 KG/M2 | HEIGHT: 63 IN | TEMPERATURE: 97.6 F | HEART RATE: 80 BPM | OXYGEN SATURATION: 93 % | DIASTOLIC BLOOD PRESSURE: 90 MMHG | RESPIRATION RATE: 16 BRPM | WEIGHT: 147.2 LBS | SYSTOLIC BLOOD PRESSURE: 156 MMHG

## 2022-01-13 DIAGNOSIS — K21.9 GASTROESOPHAGEAL REFLUX DISEASE WITHOUT ESOPHAGITIS: ICD-10-CM

## 2022-01-13 DIAGNOSIS — E78.5 HYPERLIPIDEMIA LDL GOAL <100: ICD-10-CM

## 2022-01-13 DIAGNOSIS — I10 PRIMARY HYPERTENSION: ICD-10-CM

## 2022-01-13 DIAGNOSIS — Z09 HOSPITAL DISCHARGE FOLLOW-UP: Primary | ICD-10-CM

## 2022-01-13 DIAGNOSIS — I63.9 ACUTE ISCHEMIC STROKE (H): ICD-10-CM

## 2022-01-13 DIAGNOSIS — F32.9 MAJOR DEPRESSIVE DISORDER, REMISSION STATUS UNSPECIFIED, UNSPECIFIED WHETHER RECURRENT: ICD-10-CM

## 2022-01-13 PROCEDURE — 99496 TRANSJ CARE MGMT HIGH F2F 7D: CPT | Performed by: INTERNAL MEDICINE

## 2022-01-13 RX ORDER — BUPROPION HYDROCHLORIDE 150 MG/1
150 TABLET ORAL DAILY
Qty: 90 TABLET | Refills: 3 | Status: SHIPPED | OUTPATIENT
Start: 2022-01-13 | End: 2023-01-23

## 2022-01-13 RX ORDER — ESCITALOPRAM OXALATE 20 MG/1
20 TABLET ORAL DAILY
Qty: 90 TABLET | Refills: 3 | Status: SHIPPED | OUTPATIENT
Start: 2022-01-13 | End: 2023-01-23

## 2022-01-13 RX ORDER — LISINOPRIL 10 MG/1
10 TABLET ORAL DAILY
Qty: 90 TABLET | Refills: 3 | Status: CANCELLED | OUTPATIENT
Start: 2022-01-13

## 2022-01-13 RX ORDER — METOPROLOL SUCCINATE 50 MG/1
50 TABLET, EXTENDED RELEASE ORAL DAILY
Qty: 90 TABLET | Refills: 3 | Status: SHIPPED | OUTPATIENT
Start: 2022-01-13 | End: 2022-02-07

## 2022-01-13 RX ORDER — LISINOPRIL 20 MG/1
20 TABLET ORAL DAILY
Qty: 90 TABLET | Refills: 1 | Status: SHIPPED | OUTPATIENT
Start: 2022-01-13 | End: 2022-07-06

## 2022-01-13 ASSESSMENT — MIFFLIN-ST. JEOR: SCORE: 1201.82

## 2022-01-13 NOTE — PROGRESS NOTES
"  Assessment & Plan     Hospital discharge follow-up  Stable and appears to be doing well post discharge.    Acute ischemic stroke (H)  Following up for acute risk reduction.  Observe for ongoing symptoms.  Continue with cardiac monitor.    Hyperlipidemia LDL goal <100  Continue with statin therapy recheck lipid panel in 3 months with goal LDL listed  - Hepatic function panel; Future  - Lipid Profile; Future    Primary hypertension  Poorly controlled.  Prefer to avoid hypotension.  Suggest increasing lisinopril from 10 to 20 mg daily, recheck blood pressure 2 to 3 weeks  - metoprolol succinate ER (TOPROL-XL) 50 MG 24 hr tablet; Take 1 tablet (50 mg) by mouth daily  - lisinopril (ZESTRIL) 20 MG tablet; Take 1 tablet (20 mg) by mouth daily    Major depressive disorder, remission status unspecified, unspecified whether recurrent  Stable per patient on current medications  - escitalopram (LEXAPRO) 20 MG tablet; Take 1 tablet (20 mg) by mouth daily  - buPROPion (WELLBUTRIN XL) 150 MG 24 hr tablet; Take 1 tablet (150 mg) by mouth daily    Gastroesophageal reflux disease without esophagitis  Refilled and doing well on current therapy  - omeprazole (PRILOSEC) 20 MG DR capsule; Take 1 capsule (20 mg) by mouth every morning (before breakfast) INDICATION: TO CONTROL REFLUX SYMPTOMS, No further refills till seen in the office.  :[id number]}     BMI:   Estimated body mass index is 26.08 kg/m  as calculated from the following:    Height as of this encounter: 1.6 m (5' 3\").    Weight as of this encounter: 66.8 kg (147 lb 3.2 oz).   Weight management plan: Discussed healthy diet and exercise guidelines    See Patient Instructions    Return in about 2 weeks (around 1/27/2022) for BP Recheck.    Eliceo Doran MD  [facility]    Camden Mckeon is a 62 year old who presents for the following health issues  accompanied by her self.    Establish care as new patient.    HPI     Post Discharge Outreach " 1/12/2022   Admission Date 1/9/2022   Reason for Admission TIA, acute ischemic stroke of right occipitoparietal lobe   Discharge Date 1/10/2022   Discharge Diagnosis TIA, acute ischemic stroke of right occipitoparietal lobe   How are you doing now that you are home? counting my blessings   How are your symptoms? (Red Flag symptoms escalate to triage hotline per guidelines) Improved   Do you feel your condition is stable enough to be safe at home until your provider visit? Yes   Does the patient have their discharge instructions?  Yes   Does the patient have questions regarding their discharge instructions?  No   Were you started on any new medications or were there changes to any of your previous medications?  Yes   Does the patient have all of their medications? Yes   Do you have questions regarding any of your medications?  No   Do you have all of your needed medical supplies or equipment (DME)?  (i.e. oxygen tank, CPAP, cane, etc.) Yes   Discharge follow-up appointment scheduled within 14 calendar days?  Yes   Discharge Follow Up Appointment Date 1/13/2022   Discharge Follow Up Appointment Scheduled with? Primary Care Provider     Hospital Follow-up Visit:    Hospital/Nursing Home/IP Rehab Facility: [facility]  Date of Admission: 1/9/22  Date of Discharge: 1/10/22  Reason(s) for Admission: TIA      Was your hospitalization related to COVID-19? No   Problems taking medications regularly:  None  Medication changes since discharge: noted  Problems adhering to non-medication therapy:  None    Summary of hospitalization:  [facility] discharge summary reviewed  Diagnostic Tests/Treatments reviewed.  Follow up needed: none  Other Healthcare Providers Involved in Patient s Care:         None  Update since discharge: stable. Post Discharge Medication Reconciliation: discharge medications reconciled, continue medications without change.  Plan of care communicated with patient        Linda Tomlin is a 62 year old female with hx of HTN and tobacco use who was admitted on 1/9/2021 for a TIA. She presented to the ED with slurred speech and gait instability which resolved while in the ED. Head CT and head/neck CTA on arrival were initially normal, but brain MRI revealed an acute punctate infarct within the right occipitoparietal lobe. Stroke Neurology and PT/OT/SLP were consulted on admission.     She underwent the following stroke work-up during this hospitalization:  * Lipid panel: Tchol 234, , , HDL 68  * A1c: 5.4, troponin normal  * TTE (1/10): LVEF 55-60%, no WMA, normal atrial size; normal valves; negative bubble study     - She will be discharged with ASA 81 mg and Plavix 75 mg daily x21 days, then aspirin 325 mg daily alone thereafter  - She was initiated on atorvastatin 40 mg daily at discharge  - She will be discharged with a 30 day event monitor  - PTA metoprolol and lisinopril were initially held for permissive hypertension, and will be resumed at discharge; recommend blood pressure check at next follow up appointment  - Patient has been strongly advised to quit smoking; she seems highly motivated. A nicotine patch (7 mg/24h) and nicotine gum were ordered at discharge  - She has no residual deficits and has been cleared by PT/OT/SLP    He states she is feeling well.  She denies any major complaints.  She is wearing a leadless cardiac monitor.  She has been tolerating her medications without difficulty.  She has had no recurrent complaints.    Review of Systems   CONSTITUTIONAL: NEGATIVE for fever, chills, change in weight  EYES: NEGATIVE for vision changes or irritation  ENT/MOUTH: NEGATIVE for ear, mouth and throat problems  RESP: NEGATIVE for significant cough or SOB  CV: NEGATIVE for chest pain, palpitations or peripheral edema  GI: NEGATIVE for nausea, abdominal pain, heartburn, or change in bowel habits  : NEGATIVE for frequency, dysuria, or  "hematuria  MUSCULOSKELETAL: NEGATIVE for significant arthralgias or myalgia  NEURO: NEGATIVE for weakness, dizziness or paresthesias  HEME: NEGATIVE for bleeding problems  PSYCHIATRIC: NEGATIVE for changes in mood or affect      Objective    BP (!) 156/90   Pulse 80   Temp 97.6  F (36.4  C) (Temporal)   Resp 16   Ht 1.6 m (5' 3\")   Wt 66.8 kg (147 lb 3.2 oz)   SpO2 93%   BMI 26.08 kg/m    Body mass index is 26.08 kg/m .  Physical Exam   GENERAL: healthy, alert and no distress  EYES: Eyes grossly normal to inspection, PERRL and conjunctivae and sclerae normal  HENT: ear canals and TM's normal, nose and mouth without ulcers or lesions  NECK: no adenopathy, no asymmetry, masses, or scars and thyroid normal to palpation  RESP: lungs clear to auscultation - no rales, rhonchi or wheezes  CV: regular rate and rhythm, normal S1 S2, no S3 or S4, no click or rub  MS: no gross musculoskeletal defects noted, no edema  NEURO: No focal changes noted  PSYCH: mentation appears normal, affect normal/bright      Creatinine   Date Value Ref Range Status   01/09/2022 0.72 0.52 - 1.04 mg/dL Final   08/07/2015 0.74 0.52 - 1.04 mg/dL Final                 "

## 2022-01-27 PROBLEM — R26.9 GAIT DISTURBANCE: Status: RESOLVED | Noted: 2022-01-09 | Resolved: 2022-01-27

## 2022-02-03 ENCOUNTER — OFFICE VISIT (OUTPATIENT)
Dept: INTERNAL MEDICINE | Facility: CLINIC | Age: 62
End: 2022-02-03
Payer: COMMERCIAL

## 2022-02-03 VITALS
OXYGEN SATURATION: 97 % | TEMPERATURE: 97.6 F | WEIGHT: 150 LBS | RESPIRATION RATE: 18 BRPM | DIASTOLIC BLOOD PRESSURE: 70 MMHG | BODY MASS INDEX: 26.58 KG/M2 | HEART RATE: 77 BPM | SYSTOLIC BLOOD PRESSURE: 130 MMHG | HEIGHT: 63 IN

## 2022-02-03 DIAGNOSIS — Z78.0 ASYMPTOMATIC POSTMENOPAUSAL STATUS: ICD-10-CM

## 2022-02-03 DIAGNOSIS — I10 PRIMARY HYPERTENSION: Primary | ICD-10-CM

## 2022-02-03 DIAGNOSIS — E78.5 HYPERLIPIDEMIA LDL GOAL <100: ICD-10-CM

## 2022-02-03 DIAGNOSIS — I63.9 ACUTE ISCHEMIC STROKE (H): ICD-10-CM

## 2022-02-03 DIAGNOSIS — B02.9 HERPES ZOSTER WITHOUT COMPLICATION: ICD-10-CM

## 2022-02-03 LAB
ALBUMIN SERPL-MCNC: 3.7 G/DL (ref 3.4–5)
ALP SERPL-CCNC: 73 U/L (ref 40–150)
ALT SERPL W P-5'-P-CCNC: 36 U/L (ref 0–50)
ANION GAP SERPL CALCULATED.3IONS-SCNC: 2 MMOL/L (ref 3–14)
AST SERPL W P-5'-P-CCNC: 26 U/L (ref 0–45)
BILIRUB DIRECT SERPL-MCNC: 0.1 MG/DL (ref 0–0.2)
BILIRUB SERPL-MCNC: 0.4 MG/DL (ref 0.2–1.3)
BUN SERPL-MCNC: 9 MG/DL (ref 7–30)
CALCIUM SERPL-MCNC: 8.9 MG/DL (ref 8.5–10.1)
CHLORIDE BLD-SCNC: 108 MMOL/L (ref 94–109)
CO2 SERPL-SCNC: 31 MMOL/L (ref 20–32)
CREAT SERPL-MCNC: 0.74 MG/DL (ref 0.52–1.04)
ERYTHROCYTE [DISTWIDTH] IN BLOOD BY AUTOMATED COUNT: 12.2 % (ref 10–15)
GFR SERPL CREATININE-BSD FRML MDRD: >90 ML/MIN/1.73M2
GLUCOSE BLD-MCNC: 98 MG/DL (ref 70–99)
HCT VFR BLD AUTO: 50.2 % (ref 35–47)
HGB BLD-MCNC: 16.5 G/DL (ref 11.7–15.7)
MCH RBC QN AUTO: 31.5 PG (ref 26.5–33)
MCHC RBC AUTO-ENTMCNC: 32.9 G/DL (ref 31.5–36.5)
MCV RBC AUTO: 96 FL (ref 78–100)
PLATELET # BLD AUTO: 193 10E3/UL (ref 150–450)
POTASSIUM BLD-SCNC: 4.6 MMOL/L (ref 3.4–5.3)
PROT SERPL-MCNC: 6.7 G/DL (ref 6.8–8.8)
RBC # BLD AUTO: 5.23 10E6/UL (ref 3.8–5.2)
SODIUM SERPL-SCNC: 141 MMOL/L (ref 133–144)
TSH SERPL DL<=0.005 MIU/L-ACNC: 1.34 MU/L (ref 0.4–4)
WBC # BLD AUTO: 5.2 10E3/UL (ref 4–11)

## 2022-02-03 PROCEDURE — 82248 BILIRUBIN DIRECT: CPT | Performed by: INTERNAL MEDICINE

## 2022-02-03 PROCEDURE — 36415 COLL VENOUS BLD VENIPUNCTURE: CPT | Performed by: INTERNAL MEDICINE

## 2022-02-03 PROCEDURE — 99214 OFFICE O/P EST MOD 30 MIN: CPT | Performed by: INTERNAL MEDICINE

## 2022-02-03 PROCEDURE — 80053 COMPREHEN METABOLIC PANEL: CPT | Performed by: INTERNAL MEDICINE

## 2022-02-03 PROCEDURE — 85027 COMPLETE CBC AUTOMATED: CPT | Performed by: INTERNAL MEDICINE

## 2022-02-03 PROCEDURE — 80061 LIPID PANEL: CPT | Performed by: INTERNAL MEDICINE

## 2022-02-03 PROCEDURE — 84443 ASSAY THYROID STIM HORMONE: CPT | Performed by: INTERNAL MEDICINE

## 2022-02-03 ASSESSMENT — MIFFLIN-ST. JEOR: SCORE: 1214.53

## 2022-02-03 NOTE — PROGRESS NOTES
Assessment & Plan     Primary hypertension  Recheck annual labs accordingly  - CBC with platelets; Future  - Comprehensive metabolic panel; Future  - TSH with free T4 reflex; Future      Acute ischemic stroke (H)  Stable and continuing with current ongoing therapy.  Slow improvement noted  - Lipid Profile; Future    Herpes zoster without complication  Noted as history per patient.  Supportive care recommended    Asymptomatic postmenopausal status  Ordered as routine screening  - DX Hip/Pelvis/Spine; Future    Hyperlipidemia LDL goal <100  Labs ordered as fasting  - Lipid Profile; Future    Advised to follow-up for routine annual preventative exam     See Patient Instructions    Return in about 3 months (around 5/3/2022) for Physical Exam.    Eliceo Doran MD  [facility]    Camden Mckeon is a 62 year old who presents for the following health issues  accompanied by her spouse.    HPI     Hypertension Follow-up      Do you check your blood pressure regularly outside of the clinic? No     Are you following a low salt diet? Yes    Are your blood pressures ever more than 140 on the top number (systolic) OR more   than 90 on the bottom number (diastolic), for example 140/90? No      How many servings of fruits and vegetables do you eat daily?  2-3    On average, how many sweetened beverages do you drink each day (Examples: soda, juice, sweet tea, etc.  Do NOT count diet or artificially sweetened beverages)?   1    How many days per week do you exercise enough to make your heart beat faster? 3 or less    How many minutes a day do you exercise enough to make your heart beat faster? 9 or less    How many days per week do you miss taking your medication? 0    Patient states that she was seen and evaluated for shingles in the distant past.  She apparently saw family member who diagnosed her with such and subsequently treated her with a topical medication.  She has had the Shingrix  "vaccine.      Review of Systems   CONSTITUTIONAL: NEGATIVE for fever, chills, change in weight  EYES: NEGATIVE for vision changes or irritation  ENT/MOUTH: NEGATIVE for ear, mouth and throat problems  RESP: NEGATIVE for significant cough or SOB  CV: NEGATIVE for chest pain, palpitations or peripheral edema  GI: NEGATIVE for nausea, abdominal pain, heartburn, or change in bowel habits  : NEGATIVE for frequency, dysuria, or hematuria  MUSCULOSKELETAL: NEGATIVE for significant arthralgias or myalgia  HEME: NEGATIVE for bleeding problems  PSYCHIATRIC: NEGATIVE for changes in mood or affect      Objective    /70 (BP Location: Right arm, Patient Position: Chair, Cuff Size: Adult Regular)   Pulse 77   Temp 97.6  F (36.4  C) (Temporal)   Resp 18   Ht 1.6 m (5' 3\")   Wt 68 kg (150 lb)   SpO2 97%   BMI 26.57 kg/m    Body mass index is 26.57 kg/m .  Physical Exam   GENERAL: alert and no distress  EYES: Eyes grossly normal to inspection, PERRL and conjunctivae and sclerae normal  HENT: ear canals and TM's normal, nose and mouth without ulcers or lesions  NECK: no adenopathy, no asymmetry, masses, or scars and thyroid normal to palpation  RESP: lungs clear to auscultation - no rales, rhonchi or wheezes  CV: regular rate and rhythm, normal S1 S2, no S3 or S4, no murmur, click or rub,  MS: no gross musculoskeletal defects noted, no edema  Skin: Scaly inflammatory change noted left forehead.  NEURO: No focal changes  PSYCH: mentation appears normal, affect normal/bright            "

## 2022-02-06 ENCOUNTER — MYC MEDICAL ADVICE (OUTPATIENT)
Dept: INTERNAL MEDICINE | Facility: CLINIC | Age: 62
End: 2022-02-06
Payer: COMMERCIAL

## 2022-02-06 DIAGNOSIS — I10 PRIMARY HYPERTENSION: ICD-10-CM

## 2022-02-07 LAB
CHOLEST SERPL-MCNC: 159 MG/DL
FASTING STATUS PATIENT QL REPORTED: YES
HDLC SERPL-MCNC: 69 MG/DL
LDLC SERPL CALC-MCNC: 73 MG/DL
NONHDLC SERPL-MCNC: 90 MG/DL
TRIGL SERPL-MCNC: 84 MG/DL

## 2022-02-07 RX ORDER — METOPROLOL SUCCINATE 50 MG/1
75 TABLET, EXTENDED RELEASE ORAL DAILY
Qty: 90 TABLET | Refills: 0
Start: 2022-02-07 | End: 2023-01-23

## 2022-02-13 ENCOUNTER — HEALTH MAINTENANCE LETTER (OUTPATIENT)
Age: 62
End: 2022-02-13

## 2022-02-16 ENCOUNTER — PATIENT OUTREACH (OUTPATIENT)
Dept: NURSING | Facility: CLINIC | Age: 62
End: 2022-02-16
Payer: COMMERCIAL

## 2022-02-16 DIAGNOSIS — I63.9 ACUTE ISCHEMIC STROKE (H): Primary | ICD-10-CM

## 2022-02-16 NOTE — PROGRESS NOTES
Clinic Care Coordination Contact  [facility]       Clinical Data: Care Coordinator Outreach  Outreach attempted x 1.  Patient was in her car pulling up to [facility] to get prescriptions for .  Patient requested a call back at a later time.    Plan: Care Coordinator will try to reach patient again in 1-2 business days.     Radha Arias RN, BSN, PHN  Primary Care / Care Coordinator   [facility]  E-mail [email or web address]   [phone]

## 2022-03-01 ENCOUNTER — OFFICE VISIT (OUTPATIENT)
Dept: PODIATRY | Facility: CLINIC | Age: 62
End: 2022-03-01
Payer: COMMERCIAL

## 2022-03-01 ENCOUNTER — OFFICE VISIT (OUTPATIENT)
Dept: INTERNAL MEDICINE | Facility: CLINIC | Age: 62
End: 2022-03-01
Payer: COMMERCIAL

## 2022-03-01 VITALS
WEIGHT: 149.4 LBS | SYSTOLIC BLOOD PRESSURE: 126 MMHG | DIASTOLIC BLOOD PRESSURE: 80 MMHG | HEIGHT: 63 IN | BODY MASS INDEX: 26.47 KG/M2

## 2022-03-01 VITALS
TEMPERATURE: 96.9 F | BODY MASS INDEX: 26.47 KG/M2 | RESPIRATION RATE: 15 BRPM | OXYGEN SATURATION: 97 % | HEART RATE: 91 BPM | WEIGHT: 149.4 LBS | DIASTOLIC BLOOD PRESSURE: 80 MMHG | SYSTOLIC BLOOD PRESSURE: 126 MMHG | HEIGHT: 63 IN

## 2022-03-01 DIAGNOSIS — I10 PRIMARY HYPERTENSION: ICD-10-CM

## 2022-03-01 DIAGNOSIS — Z72.0 TOBACCO ABUSE: ICD-10-CM

## 2022-03-01 DIAGNOSIS — I63.9 ACUTE ISCHEMIC STROKE (H): ICD-10-CM

## 2022-03-01 DIAGNOSIS — L98.9 SKIN LESION: Primary | ICD-10-CM

## 2022-03-01 DIAGNOSIS — M72.2 LEDDERHOSE'S DISEASE: ICD-10-CM

## 2022-03-01 DIAGNOSIS — M72.2 PLANTAR FASCIAL FIBROMATOSIS: Primary | ICD-10-CM

## 2022-03-01 DIAGNOSIS — Z12.31 VISIT FOR SCREENING MAMMOGRAM: ICD-10-CM

## 2022-03-01 PROCEDURE — 99214 OFFICE O/P EST MOD 30 MIN: CPT | Performed by: INTERNAL MEDICINE

## 2022-03-01 PROCEDURE — 99203 OFFICE O/P NEW LOW 30 MIN: CPT | Mod: 25 | Performed by: PODIATRIST

## 2022-03-01 PROCEDURE — 20550 NJX 1 TENDON SHEATH/LIGAMENT: CPT | Mod: LT | Performed by: PODIATRIST

## 2022-03-01 RX ORDER — TRIAMCINOLONE ACETONIDE 40 MG/ML
40 INJECTION, SUSPENSION INTRA-ARTICULAR; INTRAMUSCULAR ONCE
Status: COMPLETED | OUTPATIENT
Start: 2022-03-01 | End: 2022-03-01

## 2022-03-01 RX ORDER — LIDOCAINE HYDROCHLORIDE 20 MG/ML
2 INJECTION, SOLUTION INFILTRATION; PERINEURAL ONCE
Status: COMPLETED | OUTPATIENT
Start: 2022-03-01 | End: 2022-03-01

## 2022-03-01 RX ADMIN — LIDOCAINE HYDROCHLORIDE 2 ML: 20 INJECTION, SOLUTION INFILTRATION; PERINEURAL at 16:49

## 2022-03-01 RX ADMIN — TRIAMCINOLONE ACETONIDE 40 MG: 40 INJECTION, SUSPENSION INTRA-ARTICULAR; INTRAMUSCULAR at 16:49

## 2022-03-01 ASSESSMENT — PATIENT HEALTH QUESTIONNAIRE - PHQ9
10. IF YOU CHECKED OFF ANY PROBLEMS, HOW DIFFICULT HAVE THESE PROBLEMS MADE IT FOR YOU TO DO YOUR WORK, TAKE CARE OF THINGS AT HOME, OR GET ALONG WITH OTHER PEOPLE: SOMEWHAT DIFFICULT
SUM OF ALL RESPONSES TO PHQ QUESTIONS 1-9: 4
SUM OF ALL RESPONSES TO PHQ QUESTIONS 1-9: 4

## 2022-03-01 NOTE — PROGRESS NOTES
Assessment & Plan     Skin lesion  Suggested the patient may benefit from seeing podiatry to discuss whether or not lesions on foot should be excised or addressed in dermatology did reassess skin lesions to face present for 8 months    Tobacco abuse  Discussed with patient notable risk factors and suggest low-dose screening CT scan.  Patient discussed on need to follow-up accordingly to make sure insurance  - CT Chest Lung Cancer Scrn Low Dose wo; Future    Acute ischemic stroke (H)  Note as baseline but stable.  Continue with ongoing clinical improvement and risk reduction    Primary hypertension  Stable and continuing with current medical management.    Visit for screening mammogram  Ordered as routine screening.  - *MA Screening Digital Bilateral; Future      Patient also suggested to update tetanus booster through pharmacy.  She also has notable bone density scan to be rescheduled       See Patient Instructions    Return in about 3 months (around 6/1/2022) for Lab Work appointment.    Eliceo Doran MD  [facility]    Camden Mckeon is a 62 year old who presents for the following health issues  accompanied by her spouse.    History of Present Illness     Reason for visit:  Face and feet  Symptom onset:  More than a month  Symptom intensity:  Moderate  Symptom progression:  Worsening  Had these symptoms before:  Yes  Has tried/received treatment for these symptoms:  Yes  Previous treatment was successful:  No    She eats 0-1 servings of fruits and vegetables daily.She consumes 1 sweetened beverage(s) daily.She exercises with enough effort to increase her heart rate 9 or less minutes per day.  She exercises with enough effort to increase her heart rate 3 or less days per week.   She is taking medications regularly.       Skin Lesion  Onset/Duration: Months   Description  Location: Face (forehead, right cheek, right lower lip)    Color: pink/red. Lesions that come to a head  "and pop, left with scabbing in area   Border description: red  Character: flakey  Itching: mild  Bleeding:  No- only if picked at   Intensity:  mild  Progression of Symptoms:  intermittent  Accompanying signs and symptoms:   Bleeding: no  Scaling: no  Excessive sun exposure/tanning: YES  Sunscreen used: no  History:           Any previous history of skin cancer: no  Any family history of melanoma: no, hx of non aggressive skin cancers   Previous episodes of similar lesion: YES  Precipitating or alleviating factors: None   Therapies tried and outcome: Neutrogena face wash, lotion       Other concerns:  1.  Bump on bottoms of both feet, have been present for quite awhile     Review of Systems   CONSTITUTIONAL: NEGATIVE for fever, chills, change in weight  EYES: NEGATIVE for vision changes or irritation  ENT/MOUTH: NEGATIVE for ear, mouth and throat problems  RESP: NEGATIVE for significant cough or SOB  CV: NEGATIVE for chest pain, palpitations or peripheral edema  GI: NEGATIVE for nausea, abdominal pain, heartburn, or change in bowel habits  : NEGATIVE for frequency, dysuria, or hematuria  MUSCULOSKELETAL: NEGATIVE for significant arthralgias or myalgia  NEURO: NEGATIVE for weakness, dizziness or paresthesias  HEME: NEGATIVE for bleeding problems  PSYCHIATRIC: NEGATIVE for changes in mood or affect      Objective    /80   Pulse 91   Temp 96.9  F (36.1  C) (Temporal)   Resp 15   Ht 1.6 m (5' 3\")   Wt 67.8 kg (149 lb 6.4 oz)   SpO2 97%   BMI 26.47 kg/m    Body mass index is 26.47 kg/m .     Physical Exam   GENERAL: alert and no distress  EYES: Eyes grossly normal to inspection, PERRL and conjunctivae and sclerae normal  HENT: ear canals and TM's normal, nose and mouth without ulcers or lesions  NECK: no adenopathy, no asymmetry, masses, or scars and thyroid normal to palpation  RESP: lungs clear to auscultation - no rales, rhonchi or wheezes  CV: regular rate and rhythm, normal S1 S2, no S3 or S4, no " murmur, click or rub  SKIN: Patient has several small slightly firm subcutaneous nodules noted on the plantar aspect of her feet bilaterally most notable on the medial arch.  Is appear to be relatively freely mobile.  There are also some nonspecific inflammatory macular skin lesions noted to the upper left forehead and lower right facial area.  NEURO: Normal strength and tone, mentation intact and speech normal  PSYCH: mentation appears normal, affect normal/bright              Answers for HPI/ROS submitted by the patient on 3/1/2022  If you checked off any problems, how difficult have these problems made it for you to do your work, take care of things at home, or get along with other people?: Somewhat difficult  PHQ9 TOTAL SCORE: 4

## 2022-03-01 NOTE — LETTER
3/1/2022         RE: Linda Tomlin  [id number]  [place] [address]        Dear Colleague,    Thank you for referring your patient, Linda Tomlin, to the [facility]. Please see a copy of my visit note below.    Assessment:      ICD-10-CM    1. 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  Imaging studies reviewed and discussed with the patient.  Discussed surgical and conservative options.  Plantar fibromatosis, b/l.  Significant on the R foot, with extension along medial plantar fascia, potentially FHL tendon.  Due to extensive nature of R foot, recommend MRI & surgical excision.  Long discussion on recurrence, plantar incision / scar issues, involvement of FHL tendon.    MRI R foot ordered today    Injection vs excision L foot    -Injection- Procedure:  injection  PARQ session held, verbal consent obtained.  Sterile skin prep.    Location:  Left plantar fibromas, 2 large lesions  Contents:  3ml total, kenalog-40 1 ml, 2ml lidocaine plain.  Dressing applied.    Post-injection instructions reviewed including close attention to amount and duration of pain relief.    -MRI right foot  -Tobacco cessation x1 month prior to surgical excision R foot lesions      Return:  No follow-ups on file.    Gris aMlik DPM                Chief Complaint:     Patient presents with:  Foot Problems     bilateral heel pain    HPI:  Linda Tomlin is a 62 year old year old female who presents for evaluation of heel pain.    Pt states she has had lumps on both of her arches for years; were always small & not  painful.  She has noticed worsening of both in the last few months.  She states she also has Dupuytrens' contracture in both her heands & her mom had horrible plantar fibromas in her feet.      Past Medical & Surgical History:  Past Medical History:   Diagnosis Date     Breast cancer (H) 2009    lumpectomy and radiation x 35     Diverticular disease of colon      Ovarian cyst      TIA (transient ischemic attack) 1/10/2022      Past Surgical History:   Procedure Laterality Date     COLON SURGERY  [id number]    HEMICOLECTOMY     COLONOSCOPY N/A 1/3/2019    Procedure: COMBINED COLONOSCOPY, SINGLE OR MULTIPLE BIOPSY/POLYPECTOMY BY BIOPSY;  Surgeon: Mane Jay MD;  Location:  GI     HAND SURGERY  1996    Carpal Tunnel Finger/hand Surgery     HYSTERECTOMY, PAP NO LONGER INDICATED  1994     LUMPECTOMY BREAST  2008     SALPINGO OOPHORECTOMY,R/L/SIMON  01/172011    Salpingo Oophorectomy, RT/LT/SIMON     TONSILLECTOMY  1991      Family History   Problem Relation Age of Onset     Blood Disease Mother         HHT     Allergies Father      Breast Cancer Maternal Grandmother      Cancer Maternal Grandmother         mouth     Allergies Sister      Allergies Paternal Uncle      Cancer - colorectal Maternal Uncle      Allergies Paternal Aunt         Social History:  ?  History   Smoking Status     Former Smoker     Packs/day: 1.00     Years: 20.00     Types: Cigarettes     Quit date: 1/10/2022   Smokeless Tobacco     Never Used     Comment: 2 years now as of 8-1-14     History   Drug Use No     Social History    Substance and Sexual Activity      Alcohol use: Yes        Alcohol/week: 0.8 - 1.7 standard drinks        Types: 1 - 2 Standard drinks or equivalent per week      Allergies:  ?   Allergies   Allergen Reactions     Percocet [Oxycodone-Acetaminophen] Rash     Chantix [Varenicline Tartrate] Nausea and Vomiting     SEVERE VOMITING        Medications:    Current Outpatient Medications   Medication     atorvastatin (LIPITOR) 40  "MG tablet     buPROPion (WELLBUTRIN XL) 150 MG 24 hr tablet     clopidogrel (PLAVIX) 75 MG tablet     escitalopram (LEXAPRO) 20 MG tablet     lisinopril (ZESTRIL) 20 MG tablet     metoprolol succinate ER (TOPROL-XL) 50 MG 24 hr tablet     omeprazole (PRILOSEC) 20 MG DR capsule     No current facility-administered medications for this visit.       Physical Exam:  ?  Vitals:  /80   Ht 1.6 m (5' 3\")   Wt 67.8 kg (149 lb 6.4 oz)   BMI 26.47 kg/m     General:  WD/WN, in NAD.  A&O x3.  Dermatologic:    Skin is intact, open lesions absent.   Skin texture, turgor is normal.  Vascular:  Pulses palpable bilateral.  Digital capillary refill time normal bilateral.  Generalized edema- none bilateral.  Focal edema- none arch bilateral.  Neurologic:    Gross sensation normal.  Gait and balance abnormal, painful b/l.  Musculoskeletal:  Maximal pain to palpation of plantarmedial arch in area of fibromas b/l. R > L.  Fibromas w/in medial band of PF, 2 leions to L arch, >5 and very large R foot.  Ankle, STJ, MTJ ROM intact to affected extremity.  Muscle strength 5/5  foot and ankle to affected extremity.       Imaging:   x-ray independently reviewed and interpreted by myself today.  Non-weight-bearing views right foot dated 2013, reveal rectus to mild flat foot type, R          Again, thank you for allowing me to participate in the care of your patient.        Sincerely,        Gris Malik DPM    "

## 2022-03-01 NOTE — PATIENT INSTRUCTIONS
"PATIENT INSTRUCTIONS - Podiatry / Foot & Ankle Surgery      Plantar fibromatosis   Ledderhose's disease    You will have to stop smoking for 1 month prior to surgery      Aftercare for Steroid Injection  Activity:  -Resume normal activity as tolerated.  -Tendon, ligament, fascia injectionons- avoid high-impact activity for 1 week.  Icing:  -May apply to the injection site if needed.  Infection:  -Risk is generally low (<1%), but must be aware of the signs/symptoms.    -Both infection and an inflammatory reaction to the steroid (\"steroid flare\") will cause redness, warmth, and increased pain.   -If these symptoms develop within 12-24h & resolve within 2-3 days- \"steroid flare\"- ice (non-worrisome)  -If these symptoms develop after 24-48h, progressively get worse, & are associated with a fever- possible infection; call the clinic or present to urgent care immediately      We have ordered an MRI for your R foot today.    We will follow-up to review        "

## 2022-03-01 NOTE — TELEPHONE ENCOUNTER
FUTURE VISIT INFORMATION      FUTURE VISIT INFORMATION:    Date: 3/11/2022    Time: 115pm    [place] [facility] [place]:    Referring provider:  Dr. Pham     Referring providers clinic:   [facility]     Reason for visit/diagnosis  TIA     RECORDS REQUESTED FROM:       Clinic name Comments Records Status Imaging Status   Internal ED Visit/Admission-1/9/2022    Dr. Doran-1/13/2022, 2/3/2022, 3/1/2022    CTA Head/Neck-1/9/2022    MR Brain-1/9/2022 Epic PACS

## 2022-03-01 NOTE — PROGRESS NOTES
Assessment:      ICD-10-CM    1. 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  Imaging studies reviewed and discussed with the patient.  Discussed surgical and conservative options.  Plantar fibromatosis, b/l.  Significant on the R foot, with extension along medial plantar fascia, potentially FHL tendon.  Due to extensive nature of R foot, recommend MRI & surgical excision.  Long discussion on recurrence, plantar incision / scar issues, involvement of FHL tendon.    MRI R foot ordered today    Injection vs excision L foot    -Injection- Procedure:  injection  PARQ session held, verbal consent obtained.  Sterile skin prep.    Location:  Left plantar fibromas, 2 large lesions  Contents:  3ml total, kenalog-40 1 ml, 2ml lidocaine plain.  Dressing applied.    Post-injection instructions reviewed including close attention to amount and duration of pain relief.    -MRI right foot  -Tobacco cessation x1 month prior to surgical excision R foot lesions      Return:  No follow-ups on file.    Gris Malik DPM                Chief Complaint:     Patient presents with:  Foot Problems     bilateral heel pain    HPI:  Linda Tomlin is a 62 year old year old female who presents for evaluation of heel pain.    Pt states she has had lumps on both of her arches for years; were always small & not painful.  She has noticed worsening of both in the last few months.  She states she also has Dupuytrens' contracture in both her heands & her mom had horrible plantar fibromas in her feet.      Past Medical & Surgical History:  Past Medical History:   Diagnosis Date      Breast cancer (H) 2009    lumpectomy and radiation x 35     Diverticular disease of colon      Ovarian cyst      TIA (transient ischemic attack) 1/10/2022      Past Surgical History:   Procedure Laterality Date     COLON SURGERY  [id number]    HEMICOLECTOMY     COLONOSCOPY N/A 1/3/2019    Procedure: COMBINED COLONOSCOPY, SINGLE OR MULTIPLE BIOPSY/POLYPECTOMY BY BIOPSY;  Surgeon: Mane Jay MD;  Location:  GI     HAND SURGERY  1996    Carpal Tunnel Finger/hand Surgery     HYSTERECTOMY, PAP NO LONGER INDICATED  1994     LUMPECTOMY BREAST  2008     SALPINGO OOPHORECTOMY,R/L/SIMON  01/172011    Salpingo Oophorectomy, RT/LT/SIMON     TONSILLECTOMY  1991      Family History   Problem Relation Age of Onset     Blood Disease Mother         HHT     Allergies Father      Breast Cancer Maternal Grandmother      Cancer Maternal Grandmother         mouth     Allergies Sister      Allergies Paternal Uncle      Cancer - colorectal Maternal Uncle      Allergies Paternal Aunt         Social History:  ?  History   Smoking Status     Former Smoker     Packs/day: 1.00     Years: 20.00     Types: Cigarettes     Quit date: 1/10/2022   Smokeless Tobacco     Never Used     Comment: 2 years now as of 8-1-14     History   Drug Use No     Social History    Substance and Sexual Activity      Alcohol use: Yes        Alcohol/week: 0.8 - 1.7 standard drinks        Types: 1 - 2 Standard drinks or equivalent per week      Allergies:  ?   Allergies   Allergen Reactions     Percocet [Oxycodone-Acetaminophen] Rash     Chantix [Varenicline Tartrate] Nausea and Vomiting     SEVERE VOMITING        Medications:    Current Outpatient Medications   Medication     atorvastatin (LIPITOR) 40 MG tablet     buPROPion (WELLBUTRIN XL) 150 MG 24 hr tablet     clopidogrel (PLAVIX) 75 MG tablet     escitalopram (LEXAPRO) 20 MG tablet     lisinopril (ZESTRIL) 20 MG tablet     metoprolol succinate ER (TOPROL-XL) 50 MG 24 hr tablet     omeprazole (PRILOSEC) 20 MG   "capsule     No current facility-administered medications for this visit.       Physical Exam:  ?  Vitals:  /80   Ht 1.6 m (5' 3\")   Wt 67.8 kg (149 lb 6.4 oz)   BMI 26.47 kg/m     General:  WD/WN, in NAD.  A&O x3.  Dermatologic:    Skin is intact, open lesions absent.   Skin texture, turgor is normal.  Vascular:  Pulses palpable bilateral.  Digital capillary refill time normal bilateral.  Generalized edema- none bilateral.  Focal edema- none arch bilateral.  Neurologic:    Gross sensation normal.  Gait and balance abnormal, painful b/l.  Musculoskeletal:  Maximal pain to palpation of plantarmedial arch in area of fibromas b/l. R > L.  Fibromas w/in medial band of PF, 2 leions to L arch, >5 and very large R foot.  Ankle, STJ, MTJ ROM intact to affected extremity.  Muscle strength 5/5  foot and ankle to affected extremity.       Imaging:   x-ray independently reviewed and interpreted by myself today.  Non-weight-bearing views right foot dated 2013, reveal rectus to mild flat foot type, R      "

## 2022-03-02 ASSESSMENT — PATIENT HEALTH QUESTIONNAIRE - PHQ9: SUM OF ALL RESPONSES TO PHQ QUESTIONS 1-9: 4

## 2022-03-04 ENCOUNTER — PATIENT OUTREACH (OUTPATIENT)
Dept: NURSING | Facility: CLINIC | Age: 62
End: 2022-03-04
Payer: COMMERCIAL

## 2022-03-04 ENCOUNTER — TELEPHONE (OUTPATIENT)
Dept: INTERNAL MEDICINE | Facility: CLINIC | Age: 62
End: 2022-03-04
Payer: COMMERCIAL

## 2022-03-04 NOTE — PROGRESS NOTES
"Clinic Care Coordination Contact    Follow Up Progress Note      Assessment:   Patient states she is doing \"much better\" with grocery shopping and cooking.  She always eats dinner and doesn't eat during the day; however, this has changed to eating breakfast 4x/week which is an improvement.  She boils eggs, eats bagels with peanut butter and will have toast as well.  She states she gains weight when not eating, her weight is \"typically\" 135-140 pounds and today her weight is 147 pounds.    Patient states she has an upcoming foot procedure, mammogram, CT of Chest for lung cancer screening and needs a MRI of her foot before the procedure.  She states she's taking care of herself \"for once\" and that the TIA has scared her.  She hasn't had tobacco since discharge but adds, \"I did fall off the wagon twice, but only had 2 cigarette's both times\" and is not smoking currently.    Patient add the bit of tension in the home has lessened greatly due to \"Politely reminding my mother that I am 61 years old\" and apparently this has made a change for the better for everyone involved.  Obviously, patient's mother lives with her and  for \"quite sometime\".    Care Gaps:    Health Maintenance Due   Topic Date Due     ADVANCE CARE PLANNING  Never done     LUNG CANCER SCREENING  Never done     PREVENTIVE CARE VISIT  01/07/2014     DEXA  05/30/2015     DTAP/TDAP/TD IMMUNIZATION (2 - Td or Tdap) 01/26/2022       Care Gaps Last addressed on 3/4/2022    Goals addressed this encounter:   Goals Addressed                    This Visit's Progress       1. Healthy Eating (pt-stated)   60%      Goal Statement: I would like assistance and support in maintaining my health and wellness to eat healthy and have a written guide for healthy meal planning and shopping.      Date Goal set: 1/14/2021  Barriers: Patient does not cook or goes grocery shopping; her  did all the cooking  Strengths: Strong advocate for self; strong family " "support  Date to Achieve By: 7/14/2021  Patient expressed understanding of goal: Yes  Action steps to achieve this goal:  1. I will follow through with the Registered Dietician's recommendations/suggestions for meal planning  2. I will go to the grocery store and buy protein items that do not need to cooked (yogurt, Ensure/Boost, peanut butter, vegetables in small bulk, protein bars, salad mixes...etc)  3. I will use my new Ninja that I recently purchased and read the cook book it came with  4. I will remember to eat by utilizing a \"Paper\" chart on my fridge which is a visual reminder to eat  5. I will review daily the written daily meal planning calendar that will be placed on my fridge  6. I will apply my new knowledge to help nourish my  when he is discharged from the TCU at the end of January 6. I will contact my care team with questions, concerns, support needs   7. I will use the clinic as a resource, and I understand I can contact my clinic with 24/7 after hours services available   8. Care Coordinator will remain available as needed           2. Smoking Cessation (pt-stated)   50%      Goal Statement: I would like assistance and support in maintaining my health and wellness to quit smoking.   Date Goal set: 1/14/2022  Barriers: Smoker for years; quit cold turkey on 1/9/2022  Strengths: Strong advocate for self, motivated, engaged, scared after recent hospitalization  Date to Achieve By: 7/14/2022  Patient expressed understanding of goal: Yes  Action steps to achieve this goal:  1. I will continue to \"keep busy\" and not think or talk about smoking  2. I will continue to on quitting cold turkey  3. I will apply a Nicotine patch if I can control the urge  4. I will  place a Nicorette gum to the inside of my cheek every hour until the urge passes  5. I will contact my care team with questions, concerns, support needs   6. Care Coordinator will remain available as needed           COMPLETED: 3. " Psychosocial (pt-stated)         Goal Statement: I would like assistance and support in maintaining my health and wellness to prepare my house for my  when he is discharged from the TCU at the end of January.  Date Goal set: 1/14/2022  Barriers: Home is not handicapped accessible  Strengths: Motivated, engaged,  for 40 years  Date to Achieve By: 7/14/2022  Patient expressed understanding of goal: Yes  Action steps to achieve this goal:  1. I will purchase a shower chair for the shower; purchased; purchased elevated toilet seat with bars  2. I will remove clutter from the halls and floors; completed  3. I will remove rugs from the floors to prevent a fall; completed  4. I will purchase grab bars to add to shower and by toilet; completed  5. I will request home care services upon 's discharge; [facility]  6. I will purchase a pulse oximeter to have on hand ( COVID 19 since March 2021) to check oxygen saturation as needed  7. I will contact my care team with questions, concerns, support needs   8. I will use the clinic as a resource, and I understand I can contact my clinic with 24/7 after hours services available; purchased blood pressure cuff  9. Care Coordinator will remain available as needed              Intervention/Education provided during outreach:   RNCC called and spoke with patient; introduced self, discussed role of Care Coordination and explained reason for call     Outreach Frequency: monthly    Plan:   -Patient will contact the care team with questions, concerns, support needs   -Patient will use the clinic as a resource and understands (s)he can contact the [facility] clinic with 24/7 after hours services available  -Care Coordinator will remain available as needed  -RNCC will follow up in one month for follow up appointments/recommendations and goal progression     Radha Arias RN, BSN, PHN  Primary Care / Care Coordinator   [facility]  [facility]  [email or web address] [email or web address]   [phone]

## 2022-03-04 NOTE — TELEPHONE ENCOUNTER
----- Message from Gayle Arroyo sent at 3/4/2022 10:19 AM CST -----  Regarding: Policy Question  Luigi Doran,     You have ordered a Bone Density Test for this PT scheduled on 03/08/22. Per PT's insurance [facility] there is a policy on this test. When I take a look at the policy it does not look like PT passes the policy. Would you mind taking a look at it and letting me know what you think?    Bone Mineral Density Testing  Policy Number: [id number]    Females  Bone mineral density testing may be considered medically necessary for any of the following risk factors when the results of the testing will affect a treatment or regimen program:    Women age 65 and older, regardless of other risk factors;  A woman with breast cancer who becomes menopausal due to treatment of breast cancer;  A woman with breast cancer who is being treated with aromatase inhibitors ;  A patient with a recent fracture when the fracture is suspected to be associated with osteoporosis;  A patient with long-term corticosteroid therapy which is defined as greater than three months on the equivalent of 30 mg of cortisone or greater per day;  A patient on long-term (greater than one month) heparin therapy;  A patient on long-term (greater than three months) phenytoin therapy. This is drug therapy of the treatment of seizures;  A patient with known hyperparathyroidism when the test result is used to determine if the patient needs a parathyroidectomy;  A patient with excessive doses of thyroid replacement (for this indication the test is covered only if the patient has a subnormal TSH level while on thyroid replacement);  A woman with primary ovarian failure or post-ablative ovarian failure before the age of 40, who is suspected of having osteoporosis;  A patient with known osteoporosis or osteopenia;  A woman with documented estrogen deficiency and at clinical risk for osteoporosis;  All postmenopausal women under age 65 who have one or  more additional risk factors for osteoporosis, including personal history of fracture as an adult, current fracture or history of fracture in first-degree relative, current cigarette smoking, low body weight (<127 lb.);  As part of the initial workup prior to the initiation of glucocorticoid therapy. The most commonly used glucocorticoids include prednisone, prednisolone, betamethasone, and dexamethasone (Decadron).  Repeat bone mineral density testing may be considered medically necessary when performed every 24 or more months.    Repeat bone mineral density testing performed more often than every 24 months in females who do not develop new risk factors is considered not medically necessary.  This also applies to patients currently being treated with medications to increase bone density.     Repeat bone mineral density testing performed more often than every 24 months in females who develop new risk factors may be considered medically necessary.      Thank you!  Gayle Arroyo  [employer] Representative  [address] [place], [place] [address] [place] [address]  [employer]  [email or web address]   Office: [phone]  Fax [phone]  Employed by [facility]

## 2022-03-04 NOTE — TELEPHONE ENCOUNTER
Called patient and patient would like to wait until she is 66 years of age. Patient states she has no concerns about her bone density currently.     Confirmed with patient that triage nurse can cancel bone density scan and patient said to cancel the appointment.     Jesusita Herrmann RN

## 2022-03-04 NOTE — TELEPHONE ENCOUNTER
It is unclear from the patient's policy whether or not she may get coverage for her bone density scan.  If patient does not want to potentially pay for the study then she could wait until age 66 that time hopefully will be covered.

## 2022-03-11 ENCOUNTER — PRE VISIT (OUTPATIENT)
Dept: NEUROLOGY | Facility: CLINIC | Age: 62
End: 2022-03-11
Payer: COMMERCIAL

## 2022-03-11 ENCOUNTER — VIRTUAL VISIT (OUTPATIENT)
Dept: NEUROLOGY | Facility: CLINIC | Age: 62
End: 2022-03-11
Payer: COMMERCIAL

## 2022-03-11 ENCOUNTER — MYC MEDICAL ADVICE (OUTPATIENT)
Dept: INTERNAL MEDICINE | Facility: CLINIC | Age: 62
End: 2022-03-11
Payer: COMMERCIAL

## 2022-03-11 DIAGNOSIS — R19.7 DIARRHEA, UNSPECIFIED TYPE: Primary | ICD-10-CM

## 2022-03-11 DIAGNOSIS — I63.9 ACUTE ISCHEMIC STROKE (H): ICD-10-CM

## 2022-03-11 PROCEDURE — 99203 OFFICE O/P NEW LOW 30 MIN: CPT | Mod: 95 | Performed by: PSYCHIATRY & NEUROLOGY

## 2022-03-11 NOTE — LETTER
3/11/2022       RE: Linda Tomlin  [address]  [facility] [address]     Dear Colleague,    Thank you for referring your patient, Linda Tomlin, to the [facility] NEUROLOGY CLINIC [place] at [facility]. Please see a copy of my visit note below.      [facility] Neurology Consultation    Linda Tomlin MRN# [id number]   Age: 62 year old YOB: 1960     Requesting physician: Referred Self  Eliceo Doran     Reason for Consultation: right occipital infarction      History of Presenting Symptoms:   Linda Tomlin is a 62 year old female who presents today for evaluation of right occipital infarction.  The patient has a pertinent medical history of MDD, HLD, B12 deficiency, breast cancer (2009, lumpectomy and radiation), and polycythemia.  On 11/9/2022 she was admitted to [facility] for acute onset slurred speech, gait instability which resolved while in the ED.  BP in the ED was 144/106.  CT, CTA were initially normal.  MRI revealed punctate infarction of the right occipital parietal lobe.  TTE was without positive bubble and LVEF was 55-60%.  A1c was 5.5.  LDL was 116 and she was started on atorvastatin.  She was to be discharged with 30 day heart monitor.  At discharge, she had no remaining symptoms with NIHSS of 0.    Interval history:  - 30 day heart monitor (1/10/2022) was primarily with normal rate and rhythm.  Numerous dizzy events were marked but all were associated with normal sinus rhythm.    Today, the patient is still trying to quit smoking.  Now that she is only using a few cigarettes a month, her dizziness events have decreased to being nonexistent.  While monitoring blood pressures at home, she feels her averages are 136/85 or so.  She has had no return of her prior symptoms since admission.  There have been no new infections.  She is trying to exercise more often, and she eats well through the day and tries  to eat healthy.       Medications:   Atorvastatin  Bupropion  ASA   Escitalopram  Lisinopril         Assessment and Plan:   Assessment:  Punctate infarction at R-parietal occipital white matter    The patient has no deficits at this time and is continuing to work on modifying her stroke risk factors.  We discussed that her region of infarction doesn't correlate well with what her symptoms were, and that it is possible her infarction actually started somewhere else and slowly migrated or moved over time to where the image finding was.       Plan:  BP under 130/80  LDL under 70  Continue to reduce or abstain from smoking  PCP could consider ENT consultation given prior MR findings of R-sphenoid sinus polypoid lesion    Follow up in Neurology clinic should new concerns arise.      ADRIAN Rdz D.O.   of Neurology      Total time  today (30 min) in this patient encounter was spent on pre-charting, counseling and/or coordination of care. The patient is in agreement with this plan and has no further questions.

## 2022-03-11 NOTE — PROGRESS NOTES
[facility] Neurology Consultation    Linda Tomlin MRN# [id number]   Age: 62 year old YOB: 1960     Requesting physician: Referred Self  Eliceo Doran     Reason for Consultation: right occipital infarction      History of Presenting Symptoms:   Linda Tomlin is a 62 year old female who presents today for evaluation of right occipital infarction.  The patient has a pertinent medical history of MDD, HLD, B12 deficiency, breast cancer (2009, lumpectomy and radiation), and polycythemia.  On 11/9/2022 she was admitted to [facility] for acute onset slurred speech, gait instability which resolved while in the ED.  BP in the ED was 144/106.  CT, CTA were initially normal.  MRI revealed punctate infarction of the right occipital parietal lobe.  TTE was without positive bubble and LVEF was 55-60%.  A1c was 5.5.  LDL was 116 and she was started on atorvastatin.  She was to be discharged with 30 day heart monitor.  At discharge, she had no remaining symptoms with NIHSS of 0.    Interval history:  - 30 day heart monitor (1/10/2022) was primarily with normal rate and rhythm.  Numerous dizzy events were marked but all were associated with normal sinus rhythm.    Today, the patient is still trying to quit smoking.  Now that she is only using a few cigarettes a month, her dizziness events have decreased to being nonexistent.  While monitoring blood pressures at home, she feels her averages are 136/85 or so.  She has had no return of her prior symptoms since admission.  There have been no new infections.  She is trying to exercise more often, and she eats well through the day and tries to eat healthy.       Medications:   Atorvastatin  Bupropion  ASA   Escitalopram  Lisinopril         Assessment and Plan:   Assessment:  Punctate infarction at R-parietal occipital white matter    The patient has no deficits at this time and is continuing to work on modifying her stroke risk factors.  We discussed that her  region of infarction doesn't correlate well with what her symptoms were, and that it is possible her infarction actually started somewhere else and slowly migrated or moved over time to where the image finding was.       Plan:  BP under 130/80  LDL under 70  Continue to reduce or abstain from smoking  PCP could consider ENT consultation given prior MR findings of R-sphenoid sinus polypoid lesion    Follow up in Neurology clinic should new concerns arise.    ADRIAN Rdz D.O.   of Neurology    Total time  today (30 min) in this patient encounter was spent on pre-charting, counseling and/or coordination of care. The patient is in agreement with this plan and has no further questions.

## 2022-03-11 NOTE — PROGRESS NOTES
Linda is a 62 year old who is being evaluated via a billable video visit.      How would you like to obtain your AVS? MyChart  If the video visit is dropped, the invitation should be resent by: Send to e-mail at: [email or web address]  Will anyone else be joining your video visit? No      Video Start Time: 115  Video-Visit Details    Type of service:  Video Visit    Video End Time:1:40 PM    Originating Location (pt. Location): Home    Distant Location (provider location):  [facility] NEUROLOGY [facility]     Platform used for Video Visit: [email or web address]

## 2022-03-16 ENCOUNTER — HOSPITAL ENCOUNTER (OUTPATIENT)
Dept: CT IMAGING | Facility: CLINIC | Age: 62
Discharge: HOME | End: 2022-03-16
Attending: INTERNAL MEDICINE | Admitting: INTERNAL MEDICINE
Payer: COMMERCIAL

## 2022-03-16 ENCOUNTER — ANCILLARY PROCEDURE (OUTPATIENT)
Dept: MRI IMAGING | Facility: CLINIC | Age: 62
End: 2022-03-16
Attending: PODIATRIST
Payer: COMMERCIAL

## 2022-03-16 DIAGNOSIS — M72.2 PLANTAR FASCIAL FIBROMATOSIS: ICD-10-CM

## 2022-03-16 DIAGNOSIS — Z72.0 TOBACCO ABUSE: ICD-10-CM

## 2022-03-16 PROCEDURE — 71271 CT THORAX LUNG CANCER SCR C-: CPT

## 2022-03-16 PROCEDURE — 73718 MRI LOWER EXTREMITY W/O DYE: CPT | Mod: RT | Performed by: RADIOLOGY

## 2022-03-18 ENCOUNTER — MYC MEDICAL ADVICE (OUTPATIENT)
Dept: INTERNAL MEDICINE | Facility: CLINIC | Age: 62
End: 2022-03-18
Payer: COMMERCIAL

## 2022-03-29 ENCOUNTER — OFFICE VISIT (OUTPATIENT)
Dept: PODIATRY | Facility: CLINIC | Age: 62
End: 2022-03-29
Payer: COMMERCIAL

## 2022-03-29 ENCOUNTER — HOSPITAL ENCOUNTER (OUTPATIENT)
Dept: NUTRITION | Facility: CLINIC | Age: 62
Discharge: HOME | End: 2022-03-29
Attending: INTERNAL MEDICINE | Admitting: INTERNAL MEDICINE
Payer: COMMERCIAL

## 2022-03-29 ENCOUNTER — TELEPHONE (OUTPATIENT)
Dept: PODIATRY | Facility: CLINIC | Age: 62
End: 2022-03-29

## 2022-03-29 VITALS
HEIGHT: 63 IN | SYSTOLIC BLOOD PRESSURE: 120 MMHG | DIASTOLIC BLOOD PRESSURE: 68 MMHG | BODY MASS INDEX: 26.4 KG/M2 | WEIGHT: 149 LBS

## 2022-03-29 DIAGNOSIS — M72.2 PLANTAR FASCIAL FIBROMATOSIS: Primary | ICD-10-CM

## 2022-03-29 DIAGNOSIS — I63.9 ACUTE ISCHEMIC STROKE (H): ICD-10-CM

## 2022-03-29 PROCEDURE — 99213 OFFICE O/P EST LOW 20 MIN: CPT | Performed by: PODIATRIST

## 2022-03-29 PROCEDURE — 97802 MEDICAL NUTRITION INDIV IN: CPT | Mod: GT,95

## 2022-03-29 NOTE — PATIENT INSTRUCTIONS
PATIENT INSTRUCTIONS - Podiatry / Foot & Ankle Surgery      Surgery Scheduling  Please call the surgery coordinator to schedule surgery at:  [phone]  The surgery coordinator will also arrange a pre-op exam with your PCP for surgical clearance.  Please return to see your surgeon for a pre-op exam within 30 days of your scheduled procedure.        Fibroma excision - no weight on foot for 3-4 weeks    Negative nicotine test prior    Clearance for elective surgery  from your PCP - after recent stroke      [facility]  [place]  [address]   [place] [address]  Ph: [phone]  Fax: [phone] [place] [facility])  [address]  [place] [address]  Ph: [phone]  Fax: [phone]   [place]  [address]   [place] [address]  Ph: [phone]  Fax: [phone] [place]  [address]  [place] [address]   Ph: [phone]    [place]  [address]  [place] [address]   Ph: [phone]  [place]  [address]  [place] [address]   Ph: [phone]      [place]  [id number]  [place] [address]  Ph: [phone]  [place]  [address]   [place] [address]  Ph: [phone]  Fax: [phone]       *OUTSIDE STORE OPTIONS*  [facility]   [address]  [place], [place] [id number] [phone] [place]  [place]  [address]   [place], [place] [id number] [phone] [place]  [address] [address] [phone]     ** Alternative Knee Scooter Rental/Purchase: [employer] [place]  [phone]  [email or web address]

## 2022-03-29 NOTE — TELEPHONE ENCOUNTER
Patient left a voicemail stating that she would like to move forward with surgery. Please place surgery case request.       Hector Siegel, Surgery Scheduler

## 2022-03-29 NOTE — PROGRESS NOTES
Assessment:      ICD-10-CM    1. Plantar fascial fibromatosis  M72.2         Plan:  No orders of the defined types were placed in this encounter.        Discussed the etiology and treatment of the condition with the patient.  Imaging studies reviewed and discussed with the patient.  Discussed surgical and conservative options.  Plantar fibromatosis, b/l.  L foot is much better after local kenalog injection.  She would like R foot fibromas removed.  We discussed smoking cessation prior.   I will check with her PCP about the timing of elective surgery after her recent stroke/TIA 3 months ago.    NWB x3-4 weeks discussed.  Knee scooter Rx discussed afterwards in addition to crutches.    Return:  Return for Pre-op.    Gris Malik DPM                Chief Complaint:     Patient presents with:  RECHECK     bilateral heel pain    HPI:  Linda Tomlin is a 62 year old year old female who presents for evaluation of heel pain.    Pt states she has had lumps on both of her arches for years; were always small & not painful.  She has noticed worsening of both in the last few months.  She states she also has Dupuytrens' contracture in both her heands & her mom had horrible plantar fibromas in her feet.    Injection L helped  A lot last time.  She is eager to have R foot lesions removed  Down to 3 cigarettes a day    Past Medical & Surgical History:  Past Medical History:   Diagnosis Date     Breast cancer (H) 2009    lumpectomy and radiation x 35     Diverticular disease of colon      Ovarian cyst      TIA (transient ischemic attack) 1/10/2022      Past Surgical History:   Procedure Laterality Date     COLON SURGERY  [id number]    HEMICOLECTOMY     COLONOSCOPY N/A 1/3/2019    Procedure: COMBINED COLONOSCOPY, SINGLE OR MULTIPLE BIOPSY/POLYPECTOMY BY BIOPSY;  Surgeon: Mane Jay MD;  Location:  GI     HAND SURGERY  1996    Carpal Tunnel Finger/hand Surgery     HYSTERECTOMY, PAP NO LONGER INDICATED  1994      "LUMPECTOMY BREAST  2008     SALPINGO OOPHORECTOMY,R/L/SIMON  01/172011    Salpingo Oophorectomy, RT/LT/SIMON     TONSILLECTOMY  1991      Family History   Problem Relation Age of Onset     Blood Disease Mother         HHT     Allergies Father      Breast Cancer Maternal Grandmother      Cancer Maternal Grandmother         mouth     Allergies Sister      Allergies Paternal Uncle      Cancer - colorectal Maternal Uncle      Allergies Paternal Aunt         Social History:  ?  History   Smoking Status     Former Smoker     Packs/day: 1.00     Years: 20.00     Types: Cigarettes     Quit date: 1/10/2022   Smokeless Tobacco     Never Used     Comment: 2 years now as of 8-1-14     History   Drug Use No     Social History    Substance and Sexual Activity      Alcohol use: Yes        Alcohol/week: 0.8 - 1.7 standard drinks        Types: 1 - 2 Standard drinks or equivalent per week      Allergies:  ?   Allergies   Allergen Reactions     Percocet [Oxycodone-Acetaminophen] Rash     Chantix [Varenicline Tartrate] Nausea and Vomiting     SEVERE VOMITING        Medications:    Current Outpatient Medications   Medication     atorvastatin (LIPITOR) 40 MG tablet     buPROPion (WELLBUTRIN XL) 150 MG 24 hr tablet     clopidogrel (PLAVIX) 75 MG tablet     escitalopram (LEXAPRO) 20 MG tablet     lisinopril (ZESTRIL) 20 MG tablet     metoprolol succinate ER (TOPROL-XL) 50 MG 24 hr tablet     omeprazole (PRILOSEC) 20 MG DR capsule     No current facility-administered medications for this visit.       Physical Exam:  ?  Vitals:  /68   Ht 1.6 m (5' 3\")   Wt 67.6 kg (149 lb)   BMI 26.39 kg/m     General:  WD/WN, in NAD.  A&O x3.  Dermatologic:    Skin is intact, open lesions absent.   Skin texture, turgor is normal.  Vascular:  Pulses palpable bilateral.  Digital capillary refill time normal bilateral.  Generalized edema- none bilateral.  Focal edema- none arch bilateral.  Neurologic:    Gross sensation normal.  Gait and balance abnormal, " painful b/l.  Musculoskeletal:  Maximal pain to palpation of plantarmedial arch in area of fibromas b/l. R > L.  Fibromas w/in medial band of PF, 2 leions to L arch, >5 and very large R foot.  Ankle, STJ, MTJ ROM intact to affected extremity.  Muscle strength 5/5  foot and ankle to affected extremity.       Imaging:   MRI independently reviewed and interpreted by myself today.   left foot dated 3/16/22, reveal multiple fibromas to R foot, majority medially without significant involvement of the FHL tendon.  Separate isolated fibroma to plantar lateral aspect.      x-ray independently reviewed and interpreted by myself today.  Non-weight-bearing views right foot dated 2013, reveal rectus to mild flat foot type, R

## 2022-03-29 NOTE — PROGRESS NOTES
OUTPATIENT CLINICAL NUTRITION SERVICES ASSESSMENT    REASON FOR ASSESSMENT  Linda Tomlin referred by Dr. Kennedy  for MNT related to I63.9 (ICD-10-CM) - Acute ischemic stroke (H)      Patient accompanied by herself     ASSESSMENT   Nutrition History:  - Information obtained from Patient chart and patient interview    -PMH:  Patient Active Problem List   Diagnosis     Moderate major depression (H)     HTN (hypertension)     Hyperlipidemia LDL goal <100     Encounter for long-term (current) use of medications     Vitamin B12 deficiency without anemia     Closed fracture of one or more phalanges of foot     Vitamin D deficiency     Polycythemia     Low back pain     Ascorbic acid deficiency     plantar fibroma, right      Aphasia     TIA (transient ischemic attack)     Acute ischemic stroke (H)       Diet Recall:  Breakfast: Englicsh muffin with oatmeal; usually skips breakfast  Lunch: Salad at [employer] bell; usually skips lunch  Dinner: Usually a meat, vegetables/salad, rice/potato   Snack: Usually a late night snack  Beverages: diet mountain dew, 7 up very little water  Dining out: rarely      Nutritional Details:   -Food allergies: None noted  -Food sensitivities: None   -GI concerns: Patient has had diarrhea  -Appetite: Patient has little appetite during the day; her appetite is increased in the evening  -Pace of eating: good  -Role of cooking:  is the primary cook  -Role of food shopping:  the primary grocery    Physical Activity:  Days per week: None   Duration: None   Activity type: None  Limitations: None    NUTRITION FOCUSED PHYSICAL ASSESSMENT (NFPA) FOR DIAGNOSING MALNUTRITION  Yes  No:  Virtual visit conducted         Observed:   No nutrition-related physical findings observed    Obtained from Chart/Interdisciplinary Team:    LABS  Labs reviewed    MEDICATIONS  Medications reviewed  Current Outpatient Medications   Medication     atorvastatin (LIPITOR) 40 MG tablet     buPROPion (WELLBUTRIN  "XL) 150 MG 24 hr tablet     clopidogrel (PLAVIX) 75 MG tablet     escitalopram (LEXAPRO) 20 MG tablet     lisinopril (ZESTRIL) 20 MG tablet     metoprolol succinate ER (TOPROL-XL) 50 MG 24 hr tablet     omeprazole (PRILOSEC) 20 MG DR capsule     No current facility-administered medications for this encounter.       ANTHROPOMETRICS   Height: 5' 3\"  Weight:   BMI (kg/m2): 26.4  Weight Status:  Overweight BMI  IBW: 52.3 kg (115 lb)  %IBW: 129%  Weight History:   Wt Readings from Last 15 Encounters:   03/01/22 67.8 kg (149 lb 6.4 oz)   03/01/22 67.8 kg (149 lb 6.4 oz)   02/03/22 68 kg (150 lb)   01/13/22 66.8 kg (147 lb 3.2 oz)   01/10/22 68 kg (149 lb 14.4 oz)   10/06/21 64.9 kg (143 lb)   08/07/15 72.8 kg (160 lb 9.6 oz)   02/28/15 71.2 kg (157 lb)   01/07/15 75.8 kg (167 lb)   10/10/14 74 kg (163 lb 3.2 oz)   05/22/14 73.6 kg (162 lb 4.8 oz)   04/23/14 72.6 kg (160 lb)   04/11/14 73.3 kg (161 lb 8 oz)   03/24/14 72.6 kg (160 lb 1.6 oz)   01/31/14 72.6 kg (160 lb)   Weight appears to be trending back up; no significant weight loss noted.    Dosing weight: 56.3 kg-adjusted BW used    ASSESSED NUTRITION NEEDS  Estimated Energy Needs: 1,126-1,407 kcals/day (20-25 Kcal/Kg)  Justification:  (overweight)  Estimated Protein Needs: 45-56 grams protein/day (0.8-1 g pro/Kg)  Justification:  (maintenance)  Estimated Fluid Needs: 1,126-1,407 mL/day (1 mL/Kcal)    ASSESSED MALNUTRITION STATUS  % Weight Loss:  None noted  % Intake:  No decreased intake noted  Subcutaneous Fat Loss:  None observed  Loss of Muscle Mass:  None observed  Fluid Retention:  None noted    Malnutrition Diagnosis:  Patient does not meet two of the above criteria necessary for diagnosing malnutrition    DIAGNOSIS   Nutrition Diagnosis:  Food and nutrition-related knowledge deficit related to lack of previous nutrition related knowledge as evidenced by Patient recall/ request for nutrition information.      INTERVENTIONS   Nutrition Prescription go over " general healthy diet, low sodium and mindful eatiing      IMPLEMENTATION   Assessed learning needs and learning preference:   Teaching Method(s) used: Literature  Explanation    Nutrition Education (Content):              a)  Discussed Myplate, mindful eating, general healthy diet.               b)  Provided the following handouts: General, Healthful Nutrition Therapy, MyPlate Guide and Hunger- Fullness Cues    Nutrition Education (Application):              a)  Discussed current eating plans / recommended alternative food choices              b)  Patient verbalizes understanding of diet by listing way to build a healthy plate, identifying where she is on the hunger food scale     Anticipate good compliance   Stage of Change:  preparation  Additional:     GOALS  1) Patient will incorporate more water in her diet  2) Patient will consume breakfast and lunch meal following Myplate guidelines    FOLLOW UP/MONITORING   Progress towards goals will be monitored and evaluated per protocol and Practice Guidelines    Time Spent with Patient  60 minutes    Misa Rolon RDN, [place]  Clinical Dietitian  Office: [phone]  Weekend pager: [phone]

## 2022-03-29 NOTE — LETTER
3/29/2022         RE: Linda Tomlin  [id number]  [facility] [address]        Dear Colleague,    Thank you for referring your patient, Linda Tomlin, to the [facility]. Please see a copy of my visit note below.    Assessment:      ICD-10-CM    1. Plantar fascial fibromatosis  M72.2         Plan:  No orders of the defined types were placed in this encounter.        Discussed the etiology and treatment of the condition with the patient.  Imaging studies reviewed and discussed with the patient.  Discussed surgical and conservative options.  Plantar fibromatosis, b/l.  L foot is much better after local kenalog injection.  She would like R foot fibromas removed.  We discussed smoking cessation prior.   I will check with her PCP about the timing of elective surgery after her recent stroke/TIA 3 months ago.    NWB x3-4 weeks discussed.  Knee scooter Rx discussed afterwards in addition to crutches.    Return:  Return for Pre-op.    Gris Malik DPM                Chief Complaint:     Patient presents with:  RECHECK     bilateral heel pain    HPI:  Linda Tomlin is a 62 year old year old female who presents for evaluation of heel pain.    Pt states she has had lumps on both of her arches for years; were always small & not painful.  She has noticed worsening of both in the last few months.  She states she also has Dupuytrens' contracture in both her heands & her mom had horrible plantar fibromas in her feet.    Injection L helped  A lot last time.  She is eager to have R foot lesions removed  Down to 3 cigarettes a day    Past Medical & Surgical History:  Past Medical History:   Diagnosis Date     Breast cancer (H) 2009    lumpectomy and radiation x 35     Diverticular disease of colon      Ovarian cyst      TIA (transient ischemic attack) 1/10/2022      Past Surgical History:   Procedure Laterality Date     COLON SURGERY  [id number]    HEMICOLECTOMY      "COLONOSCOPY N/A 1/3/2019    Procedure: COMBINED COLONOSCOPY, SINGLE OR MULTIPLE BIOPSY/POLYPECTOMY BY BIOPSY;  Surgeon: Mane Jay MD;  Location:  GI     HAND SURGERY  1996    Carpal Tunnel Finger/hand Surgery     HYSTERECTOMY, PAP NO LONGER INDICATED  1994     LUMPECTOMY BREAST  2008     SALPINGO OOPHORECTOMY,R/L/SIMON  01/172011    Salpingo Oophorectomy, RT/LT/SIMON     TONSILLECTOMY  1991      Family History   Problem Relation Age of Onset     Blood Disease Mother         HHT     Allergies Father      Breast Cancer Maternal Grandmother      Cancer Maternal Grandmother         mouth     Allergies Sister      Allergies Paternal Uncle      Cancer - colorectal Maternal Uncle      Allergies Paternal Aunt         Social History:  ?  History   Smoking Status     Former Smoker     Packs/day: 1.00     Years: 20.00     Types: Cigarettes     Quit date: 1/10/2022   Smokeless Tobacco     Never Used     Comment: 2 years now as of 8-1-14     History   Drug Use No     Social History    Substance and Sexual Activity      Alcohol use: Yes        Alcohol/week: 0.8 - 1.7 standard drinks        Types: 1 - 2 Standard drinks or equivalent per week      Allergies:  ?   Allergies   Allergen Reactions     Percocet [Oxycodone-Acetaminophen] Rash     Chantix [Varenicline Tartrate] Nausea and Vomiting     SEVERE VOMITING        Medications:    Current Outpatient Medications   Medication     atorvastatin (LIPITOR) 40 MG tablet     buPROPion (WELLBUTRIN XL) 150 MG 24 hr tablet     clopidogrel (PLAVIX) 75 MG tablet     escitalopram (LEXAPRO) 20 MG tablet     lisinopril (ZESTRIL) 20 MG tablet     metoprolol succinate ER (TOPROL-XL) 50 MG 24 hr tablet     omeprazole (PRILOSEC) 20 MG DR capsule     No current facility-administered medications for this visit.       Physical Exam:  ?  Vitals:  /68   Ht 1.6 m (5' 3\")   Wt 67.6 kg (149 lb)   BMI 26.39 kg/m     General:  WD/WN, in NAD.  A&O x3.  Dermatologic:    Skin is intact, open " lesions absent.   Skin texture, turgor is normal.  Vascular:  Pulses palpable bilateral.  Digital capillary refill time normal bilateral.  Generalized edema- none bilateral.  Focal edema- none arch bilateral.  Neurologic:    Gross sensation normal.  Gait and balance abnormal, painful b/l.  Musculoskeletal:  Maximal pain to palpation of plantarmedial arch in area of fibromas b/l. R > L.  Fibromas w/in medial band of PF, 2 leions to L arch, >5 and very large R foot.  Ankle, STJ, MTJ ROM intact to affected extremity.  Muscle strength 5/5  foot and ankle to affected extremity.       Imaging:   MRI independently reviewed and interpreted by myself today.   left foot dated 3/16/22, reveal multiple fibromas to R foot, majority medially without significant involvement of the FHL tendon.  Separate isolated fibroma to plantar lateral aspect.      x-ray independently reviewed and interpreted by myself today.  Non-weight-bearing views right foot dated 2013, reveal rectus to mild flat foot type, R          Again, thank you for allowing me to participate in the care of your patient.        Sincerely,        Gris Malik DPM

## 2022-03-31 ENCOUNTER — HOSPITAL ENCOUNTER (OUTPATIENT)
Dept: MAMMOGRAPHY | Facility: CLINIC | Age: 62
Discharge: HOME | End: 2022-03-31
Attending: INTERNAL MEDICINE | Admitting: INTERNAL MEDICINE
Payer: COMMERCIAL

## 2022-03-31 DIAGNOSIS — Z12.31 VISIT FOR SCREENING MAMMOGRAM: ICD-10-CM

## 2022-03-31 PROCEDURE — 77067 SCR MAMMO BI INCL CAD: CPT

## 2022-04-06 ENCOUNTER — PATIENT OUTREACH (OUTPATIENT)
Dept: NURSING | Facility: CLINIC | Age: 62
End: 2022-04-06
Payer: COMMERCIAL

## 2022-04-06 ENCOUNTER — PREP FOR PROCEDURE (OUTPATIENT)
Dept: PODIATRY | Facility: CLINIC | Age: 62
End: 2022-04-06
Payer: COMMERCIAL

## 2022-04-06 DIAGNOSIS — M72.2 PLANTAR FASCIAL FIBROMATOSIS: Primary | ICD-10-CM

## 2022-04-06 ASSESSMENT — ACTIVITIES OF DAILY LIVING (ADL): DEPENDENT_IADLS:: INDEPENDENT

## 2022-04-06 NOTE — PROGRESS NOTES
"Clinic Care Coordination Contact    Follow Up Progress Note      Assessment:   Patient states she enjoyed her RD appointment and add, \"I really liked her!\".  Patient bought the flavored drops to add to water to change the taste and she is really happy with the suggestions and results.  She is drinking \"way more water\" than she has been before.    Patient is still trying to eat three meals/day and she believes her weight is stable or not dropping because she drinks soda's in the evening with a glass or two of wine.  Instead of eating a bag of chips later in the evening, after supper, she now snacks on wheat thins and is satisfied \"after 3-4 wheat thins\".      Patient really would like to have her right foot surgery as soon as possible due to the upcoming spring/summer and she gardens.  RNCC stated she needed a negative nicotine test prior to scheduling.  Patient states she has \"6 cigarettes left\" and will not buy another pack and work through the \"IP Ghoster's\" when she is craving a cigarette.  She plans to use the patches, is a bit hard on herself for quitting for 6 weeks and then starting up again; however, she will go up and down the steps (foot pain permitted), chew on a straw and take deep breaths during her cravings.    RNCC shared she might need a Bone Density test due to hx of cancer diagnosis and smoking; however, it appears her insurance does not cover this procedure until she's 66 years old.  Patient states she cannot wait for 4 years for foot surgery and is wondering what her options are at this point.    RNCC will reach out to PCP/Podiatry and inquire on her behalf and will follow up with patient once RNCC hears back from PCP/Podiatry.    Care Gaps:    Health Maintenance Due   Topic Date Due     ADVANCE CARE PLANNING  Never done     PREVENTIVE CARE VISIT  01/07/2014     DEXA  05/30/2015     DTAP/TDAP/TD IMMUNIZATION (2 - Td or Tdap) 01/26/2022     Care Gaps Last addressed on 4/6/2022 Patient aware of needed " "second Td or Tdap prior to foot surgery    Goals addressed this encounter:   Goals Addressed                    This Visit's Progress       1. Healthy Eating (pt-stated)   60%      Goal Statement: I would like assistance and support in maintaining my health and wellness to eat healthy and have a written guide for healthy meal planning and shopping.      Date Goal set: 1/14/2021  Barriers: Patient does not cook or goes grocery shopping; her  did all the cooking  Strengths: Strong advocate for self; strong family support  Date to Achieve By: 7/14/2021  Patient expressed understanding of goal: Yes  Action steps to achieve this goal:  1. I will follow through with the Registered Dietician's recommendations/suggestions for meal planning  2. I will go to the grocery store and buy protein items that do not need to cooked (yogurt, Ensure/Boost, peanut butter, vegetables in small bulk, protein bars, salad mixes...etc)  3. I will use my new Ninja that I recently purchased and read the cook book it came with  4. I will remember to eat by utilizing a \"Paper\" chart on my fridge which is a visual reminder to eat  5. I will review daily the written daily meal planning calendar that will be placed on my fridge  6. I will apply my new knowledge to help nourish my  when he is discharged from the [facility]CU at the end of January  6. I will contact my care team with questions, concerns, support needs   7. I will use the clinic as a resource, and I understand I can contact my clinic with 24/7 after hours services available   8. Care Coordinator will remain available as needed           2. Smoking Cessation (pt-stated)   50%      Goal Statement: I would like assistance and support in maintaining my health and wellness to quit smoking.   Date Goal set: 1/14/2022  Barriers: Smoker for years; quit cold turkey on 1/9/2022  Strengths: Strong advocate for self, motivated, engaged, scared after recent hospitalization  Date to Achieve By: " "7/14/2022  Patient expressed understanding of goal: Yes  Action steps to achieve this goal:  1. I will continue to \"keep busy\" and not think or talk about smoking  2. I will continue to on quitting cold turkey  3. I will apply a Nicotine patch if I can control the urge  4. I will  place a Nicorette gum to the inside of my cheek every hour until the urge passes  5. I will contact my care team with questions, concerns, support needs   6. Care Coordinator will remain available as needed            Intervention/Education provided during outreach:   RNCC called and spoke with patient; introduced self, discussed role of Care Coordination and explained reason for call     Plan:   -Patient will contact the care team with questions, concerns, support needs   -Patient will use the clinic as a resource and understands (s)he can contact the [facility] with 24/7 after hours services available  -Care Coordinator will remain available as needed  -RNCC will follow up in one month for follow up appointments/recommendations and goal progression     Radha Arias RN, BSN, PHN  Primary Care / Care Coordinator   [facility]  E-mail [email or web address]   [phone]      "

## 2022-04-06 NOTE — TELEPHONE ENCOUNTER
Issues of smoking cessation prior to her surgery need to be addressed by her podiatrist as it appears that this is their restriction to such procedure.  In regards to bone density scan,  If insurance does not cover patient always has option of paying out-of-pocket.  Suggest patient may benefit from talking to her neurologist about the timing of procedure in the setting of her recent CVA.

## 2022-04-06 NOTE — TELEPHONE ENCOUNTER
Received order from Dr. Malik.  However provider would like patient to get a nicotine test completed prior to proceeding with surgery.     Awaiting response from Dr. Malik on those directions.     Patient prefers [facility] over [facility].     Will follow up with patient in a few days for next step.       Hector Siegel, Surgery Scheduler

## 2022-04-06 NOTE — TELEPHONE ENCOUNTER
Spoke to patient with providers message. Patient verbalized understanding and will contact Neurologist to discuss future procedure safety. Patient will also discuss the need of bone density scan with her podiatrist as this is not covered through insurance at this time.     Patient requesting to schedule a telephone visit with PCP to discuss other questions. Patient scheduled for telephone visit 4/12.

## 2022-04-12 ENCOUNTER — VIRTUAL VISIT (OUTPATIENT)
Dept: INTERNAL MEDICINE | Facility: CLINIC | Age: 62
End: 2022-04-12
Payer: COMMERCIAL

## 2022-04-12 DIAGNOSIS — I63.9 ACUTE ISCHEMIC STROKE (H): ICD-10-CM

## 2022-04-12 DIAGNOSIS — M72.2 PLANTAR FASCIAL FIBROMATOSIS: Primary | ICD-10-CM

## 2022-04-12 PROCEDURE — 99214 OFFICE O/P EST MOD 30 MIN: CPT | Mod: 95 | Performed by: INTERNAL MEDICINE

## 2022-04-12 NOTE — PROGRESS NOTES
Linda is a 62 year old who is being evaluated via a billable telephone visit.      What phone number would you like to be contacted at? [phone]  How would you like to obtain your AVS? Ximena    Assessment & Plan     plantar fibroma, right   Discussed with patient that only option at this point would be to see a different podiatrist for a second opinion in regards to current podiatry requirement of nicotine test and bone density scan prior to upcoming surgical procedure.  - Orthopedic  Referral; Future    Acute ischemic stroke (H)  Patient will be discussing with her neurologist and likely recommend at least 3 months post CVA prior to surgical procedure then may get surgery done accordingly with holding aspirin as per protocol.  - aspirin (ASA) 81 MG EC tablet; Take 1 tablet (81 mg) by mouth daily       Tobacco Cessation:   reports that she has been smoking cigarettes. She has a 20.00 pack-year smoking history. She has never used smokeless tobacco.  Tobacco Cessation Action Plan: Information offered: Patient not interested at this time    See Patient Instructions    Return in about 1 month (around 5/12/2022) for follow-up Podiatry.    Eliceo Doran MD  [facility]    Subjective :    Linda is a 62 year old who presents for the following health issues     HPI     Patient would like to discuss plantar fibroma excision of right foot. Per chart review, it appears patient is still waiting back to hear from podiatry on status of proceeding with surgery without getting bone density scan completed prior. Patient states her insurance will not cover dexa scan until age 66, and she is unable to pay for this out of pocket. Patient states she was also informed that Dr. Malik would like a nicotine test completed prior to proceeding with surgery, but received no additional information on this testing. Patient is feeling frustrated with unclear follow up about surgery and is  questioning what alternatives she has, or if she should see a different podiatrist. Patient states she does not want to wait until age 66 to have this completed.     Review of Systems   CONSTITUTIONAL: NEGATIVE for fever, chills, change in weight  EYES: NEGATIVE for vision changes or irritation  ENT/MOUTH: NEGATIVE for ear, mouth and throat problems  RESP: NEGATIVE for significant cough or SOB  CV: NEGATIVE for chest pain, palpitations or peripheral edema  GI: NEGATIVE for nausea, abdominal pain, heartburn, or change in bowel habits  : NEGATIVE for frequency, dysuria, or hematuria  HEME: NEGATIVE for bleeding problems  PSYCHIATRIC: NEGATIVE for changes in mood or affect      Objective         Vitals:  No vitals were obtained today due to virtual visit.    Physical Exam   alert and no distress  PSYCH: Alert and oriented times 3; coherent speech, normal   rate and volume, able to articulate logical thoughts, able   to abstract reason, no tangential thoughts, no hallucinations   or delusions  Her affect is normal  RESP: No cough, no audible wheezing, able to talk in full sentences  Remainder of exam unable to be completed due to telephone visits          Phone call duration: 14 minutes

## 2022-04-12 NOTE — TELEPHONE ENCOUNTER
Spoke to patient today regarding steps for the nicotine test.  Patient is confused as to the reason why one is needed.  Patient would like a call from provider to discuss this.      Patient also had questions about the bone density test and reasons for it.  Patient checked with her insurance and it will not be covered.  Patient is unable to pay out of pocket and would like to forego this test.    With all these concerns, patient will wait on scheduling surgery at this time.     Please reach out to patient to discuss her concerns.  If you would like me to schedule a phone appt, I certainly can do that, too.       Hector Siegel, Surgery Scheduler

## 2022-05-16 ENCOUNTER — PATIENT OUTREACH (OUTPATIENT)
Dept: CARE COORDINATION | Facility: CLINIC | Age: 62
End: 2022-05-16
Payer: COMMERCIAL

## 2022-05-16 ASSESSMENT — ACTIVITIES OF DAILY LIVING (ADL): DEPENDENT_IADLS:: INDEPENDENT

## 2022-05-16 NOTE — LETTER
[address] [facility] [address]    [facility]  Patient Centered Plan of Care  About Me:        Patient Name:  Linda Cheng    YOB: 1960  Age:         62 year old   Whit MRN:    [id number] Telephone Information:  Home Phone [phone]   Mobile [phone]       Address:  [address]  [facility] [address] Email address:  [email or web address]      Emergency Contact(s)    Name Relationship Lgl Grd Work Phone Home Phone Mobile Phone   1. LUIS CHENG* Spouse    [phone]   2. IVY JOYA Mother   [phone]            Primary language:  English     needed? No   [employer]:  [phone] op. 1  Other communication barriers:None    Preferred Method of Communication:  Ximena  Current living arrangement: I live in a private home with family (Patient's 80 year old mother lives with patient)    Mobility Status/ Medical Equipment: Independent    Health Maintenance  Health Maintenance Reviewed: Due/Overdue   Health Maintenance Due   Topic Date Due     ADVANCE CARE PLANNING  Never done     Pneumococcal Vaccine: Pediatrics (0 to 5 Years) and At-Risk Patients (6 to 64 Years) (2 - PCV) 06/15/2013     PREVENTIVE CARE VISIT  01/07/2014     DEXA  05/30/2015     DTAP/TDAP/TD IMMUNIZATION (2 - Td or Tdap) 01/26/2022     COVID-19 Vaccine (4 - Booster for Pfizer series) 04/28/2022     My Access Plan  Medical Emergency 911   Primary Clinic Line [facility] [phone]   24 Hour Appointment Line [phone] or  [phone]) (toll-free)   24 Hour Nurse Line [phone] (toll-free)   Preferred Urgent Care [facility], [phone]     Preferred Hospital [facility]  [phone]     Preferred Pharmacy [id number] - [place], [place] - [address] [facility]     Behavioral Health Crisis Line The National Suicide Prevention Lifeline at 1-460.621.2346 or 214      My Care Team Members  Patient Care Team       Relationship Specialty Notifications Start End    Eliceo Doran MD PCP - General Internal Medicine  3/1/22     Phone: [phone] Fax: [phone]         [address] [place] [address]    Hill Francois MD Assigned Heart and Vascular Provider   10/17/21     Phone: [phone] Fax: [phone] [address] [facility] [address] [facility] [address]    Radha Arias, RN Lead Care Coordinator  Admissions 1/14/22     Eliceo Doran MD Assigned PCP   1/23/22     Phone: [phone] Fax: [phone]         [address] [place] [address]    Gris Malik DPM Assigned Surgical Provider   3/6/22     Phone: [phone] Fax: [phone] [address] [facility] [address]    Stefan Rdz DO Assigned Neuroscience Provider   3/20/22     Phone: [phone] Fax: [phone]         [address] [facility] [address]            My Care Plans  Self Management and Treatment Plan  Goals and (Comments)   Goals        General     1. Healthy Eating (pt-stated)      Notes - Note edited  5/16/2022 12:26 PM by Radha Arias RN     Goal Statement: I would like assistance and support in maintaining my health and wellness to eat healthy and have a written guide for healthy meal planning and shopping.      Date Goal set: 1/14/2021  Barriers: Patient does not cook or goes grocery shopping; her  did all the cooking  Strengths: Strong advocate for self; strong family support  Date to Achieve By: 7/14/2021 //  Modified, edited 11/14/2022  Patient expressed understanding of goal: Yes  Action steps to achieve this goal:  1. I will follow through with the Registered Dietician's recommendations/suggestions for meal planning  2. I will go to the grocery store and buy protein items that do not need to cooked (yogurt, Ensure/Boost, peanut butter, vegetables in small bulk, protein bars, salad mixes...etc)  3. I will  "use my new Ninja that I recently purchased and read the cook book it came with  4. I will remember to eat by utilizing a \"Paper\" chart on my fridge which is a visual reminder to eat  5. I will review daily the written daily meal planning calendar that will be placed on my fridge  6. I will contact my care team with questions, concerns, support needs   7. I will use the clinic as a resource, and I understand I can contact my clinic with 24/7 after hours services available   8. Care Coordinator will remain available as needed    Annual Assessment Due: 1/2023           2. Smoking Cessation (pt-stated)      Notes - Note edited  5/16/2022 12:31 PM by Radha Arias RN     Goal Statement: I would like assistance and support in maintaining my health and wellness to quit smoking.   Date Goal set: 1/14/2022  Barriers: Smoker for years; Patient quit cold turkey on 1/9/2022; relapse February 2022  Strengths: Strong advocate for self, motivated, engaged, scared after January 2022 hospitalization  Date to Achieve By: 7/14/2022  // Modified, edited 11/14/2022  Patient expressed understanding of goal: Yes  Action steps to achieve this goal:  1. I will continue to \"keep busy\" and not think or talk about smoking  2. I will continue to on quitting cold turkey  3. I will apply a Nicotine patch if I can control the urge  4. I will  place a Nicorette gum to the inside of my cheek every hour until the urge passes  5. I will contact my care team with questions, concerns, support needs   6. Care Coordinator will remain available as needed                 Action Plans on File:     Advance Care Plans/Directives Type:   -- (Full Code)      My Medical and Care Information  Problem List   Patient Active Problem List   Diagnosis     Moderate major depression (H)     HTN (hypertension)     Hyperlipidemia LDL goal <100     Encounter for long-term (current) use of medications     Vitamin B12 deficiency without anemia     Closed fracture of one " or more phalanges of foot     Vitamin D deficiency     Polycythemia     Low back pain     Ascorbic acid deficiency     plantar fibroma, right      Aphasia     TIA (transient ischemic attack)     Acute ischemic stroke (H)      Current Medications and Allergies:    Allergies   Allergen Reactions     Percocet [Oxycodone-Acetaminophen] Rash     Chantix [Varenicline Tartrate] Nausea and Vomiting     SEVERE VOMITING     Current Outpatient Medications   Medication     aspirin (ASA) 81 MG EC tablet     atorvastatin (LIPITOR) 40 MG tablet     buPROPion (WELLBUTRIN XL) 150 MG 24 hr tablet     escitalopram (LEXAPRO) 20 MG tablet     lisinopril (ZESTRIL) 20 MG tablet     metoprolol succinate ER (TOPROL-XL) 50 MG 24 hr tablet     omeprazole (PRILOSEC) 20 MG DR capsule     No current facility-administered medications for this visit.     Care Coordination Start Date: 1/14/2022   Frequency of Care Coordination: monthly     Form Last Updated: 05/16/2022

## 2022-05-16 NOTE — PROGRESS NOTES
Clinic Care Coordination Contact  [facility]/Voicemail    Referral Source: Care Team  Clinical Data: Care Coordinator Outreach  Outreach attempted x 1.  Left message on patient's voicemail with call back information and requested return call.    Plan: Care Coordinator will try to reach patient again in 10-14 business days.     Radha Arias RN, BSN, PHN  Primary Care / Care Coordinator   [facility]  E-mail [email or web address]   [phone]

## 2022-05-19 ENCOUNTER — HOSPITAL ENCOUNTER (OUTPATIENT)
Dept: NUTRITION | Facility: CLINIC | Age: 62
Discharge: HOME | End: 2022-05-19
Attending: INTERNAL MEDICINE | Admitting: INTERNAL MEDICINE
Payer: COMMERCIAL

## 2022-05-19 PROCEDURE — 97803 MED NUTRITION INDIV SUBSEQ: CPT | Mod: GT,95

## 2022-05-19 NOTE — PROGRESS NOTES
"OUTPATIENT CLINICAL NUTRITION SERVICES FOLLOW-UP     REASON FOR ASSESSMENT  Linda Tomlin referred by Dr. Kennedy for MNT related to Acute ischemic stroke (H)       Patient accompanied by herself       NEW FINDINGS:   -Patient is not a big meater; eats small portions  -Patient is working on water intakes  -Patient often forgets about lunch and \"scrambles\"  -Patient feels like she has night time snacking  -Patient does better with accountable     Previous Nutrition Goals:  1) Patient will incorporate more water in her diet  Evaluation: Not met; improving    2) Patient will consume breakfast and lunch meal following Myplate guidelines  Evaluation: Not met; Improving    Previous Nutrition Follow Up / Monitoring:  Progress towards goals will be monitored and evaluated per protocol and Practice Guidelines     Previous Nutrition Diagnosis:  Food and nutrition-related knowledge deficit related to lack of previous nutrition related knowledge as evidenced by Patient recall/ request for nutrition information.    Evaluation: No change      Newest Food Record  Breakfast: english muffin with PB and a side of fruit   Lunch: 1 slider sized bun with tuna fish or meat,   Dinner: Some sort of protein, vegetables/ salads, baked potato or rice   Snack: Wheat thins patient feels like she grazes  Beverages: 1 small can of regular pepsi, bubly, water flavoring  Dining out: rarely; Patient usually eats half hamburger and tries to get a side salad; will occasionally get sweat potatoes fries         Physical Activity:  Days per week: 7  Duration: 4-5 hours   Activity type: gardening  Limitations: None      ANTHROPOMETRICS   Height: 5' 3''  Weight: 67.6 kg  BMI (kg/m2): 26.4  Weight Status:  Overweight BMI  IBW: 52.3 kg (115 lb)  %IBW: 129%  Weight History:       Wt Readings from Last 15 Encounters:   03/01/22 67.8 kg (149 lb 6.4 oz)   03/01/22 67.8 kg (149 lb 6.4 oz)   02/03/22 68 kg (150 lb)   01/13/22 66.8 kg (147 lb 3.2 oz)   01/10/22 " 68 kg (149 lb 14.4 oz)   10/06/21 64.9 kg (143 lb)   08/07/15 72.8 kg (160 lb 9.6 oz)   02/28/15 71.2 kg (157 lb)   01/07/15 75.8 kg (167 lb)   10/10/14 74 kg (163 lb 3.2 oz)   05/22/14 73.6 kg (162 lb 4.8 oz)   04/23/14 72.6 kg (160 lb)   04/11/14 73.3 kg (161 lb 8 oz)   03/24/14 72.6 kg (160 lb 1.6 oz)   01/31/14 72.6 kg (160 lb)   Weight appears to be trending back up; no significant weight loss noted.     Dosing weight: 56.3 kg-adjusted BW used      ASSESSED NUTRITION NEEDS  Estimated Energy Needs: 1,126-1,407 kcals/day (20-25 Kcal/Kg)  Justification:  (overweight)  Estimated Protein Needs: 45-56 grams protein/day (0.8-1 g pro/Kg)  Justification:  (maintenance)  Estimated Fluid Needs: 1,126-1,407 mL/day (1 mL/Kcal)      DIAGNOSIS   Nutrition Diagnosis: Food and nutrition-related knowledge deficit related to lack of previous nutrition related knowledge as evidenced by Patient recall/ request for nutrition information.        INTERVENTIONS   Nutrition Prescription Mindful eating       IMPLEMENTATION   Assessed learning needs and learning preference:   Teaching Method(s) used: Explanation    Nutrition Education (Content):              a)  Discussed My plate, mindful eating, water intake                Nutrition Education (Application):              a)  Discussed current eating plans / recommended alternative food choices              b)  Patient verbalizes understanding of diet by identifying 1-2 mindfulness techniques, identifying 2-3 smart snack combinations    Anticipate good compliance   Stage of Change:  preparation  Additional:     GOALS  1) Patient wants to quit smoking; wants to quit cold turkey   2) Patient would like to incorporate more water  3) Patient would like to incorporate Myplate breakfast and lunch     FOLLOW UP/MONITORING   Progress towards goals will be monitored and evaluated per protocol and Practice Guidelines    Time Spent with Patient  60 minutes    Misa Rolon RDN, [place]  Clinical  Dietitian  Office: [phone]  Weekend pager: [phone]

## 2022-05-26 ENCOUNTER — OFFICE VISIT (OUTPATIENT)
Dept: DERMATOLOGY | Facility: CLINIC | Age: 62
End: 2022-05-26
Payer: COMMERCIAL

## 2022-05-26 VITALS — HEART RATE: 84 BPM | DIASTOLIC BLOOD PRESSURE: 80 MMHG | OXYGEN SATURATION: 95 % | SYSTOLIC BLOOD PRESSURE: 118 MMHG

## 2022-05-26 DIAGNOSIS — L71.0 PERIORAL DERMATITIS: Primary | ICD-10-CM

## 2022-05-26 DIAGNOSIS — L90.5 SCAR: ICD-10-CM

## 2022-05-26 DIAGNOSIS — L81.4 LENTIGO: ICD-10-CM

## 2022-05-26 DIAGNOSIS — L82.1 SEBORRHEIC KERATOSIS: ICD-10-CM

## 2022-05-26 DIAGNOSIS — T14.8XXA EXCORIATION: ICD-10-CM

## 2022-05-26 PROCEDURE — 99243 OFF/OP CNSLTJ NEW/EST LOW 30: CPT | Performed by: DERMATOLOGY

## 2022-05-26 RX ORDER — DOXYCYCLINE 100 MG/1
100 CAPSULE ORAL 2 TIMES DAILY
Qty: 60 CAPSULE | Refills: 6 | Status: SHIPPED | OUTPATIENT
Start: 2022-05-26 | End: 2023-03-02

## 2022-05-26 RX ORDER — PIMECROLIMUS 10 MG/G
CREAM TOPICAL 2 TIMES DAILY
Qty: 60 G | Refills: 6 | Status: SHIPPED | OUTPATIENT
Start: 2022-05-26 | End: 2023-06-30

## 2022-05-26 NOTE — PROGRESS NOTES
Linda Tomlin is an extremely pleasant 62 year old year old female patient I was asked to see by Dr. Doran for spots on face.  Patient states this has been present for a while.  Patient reports the following symptoms:  Itching and she admits to picking.  .  Patient reports the following previous treatments none.  These treatments did not work.  Patient reports the following modifying factors none.  Associated symptoms: none.  Patient has no other skin complaints today.  Remainder of the HPI, Meds, PMH, Allergies, FH, and SH was reviewed in chart.      Past Medical History:   Diagnosis Date     Breast cancer (H) 2009    lumpectomy and radiation x 35     Diverticular disease of colon      Ovarian cyst      TIA (transient ischemic attack) 1/10/2022       Past Surgical History:   Procedure Laterality Date     COLON SURGERY  [id number]    HEMICOLECTOMY     COLONOSCOPY N/A 1/3/2019    Procedure: COMBINED COLONOSCOPY, SINGLE OR MULTIPLE BIOPSY/POLYPECTOMY BY BIOPSY;  Surgeon: Mane Jay MD;  Location:  GI     HAND SURGERY  1996    Carpal Tunnel Finger/hand Surgery     HYSTERECTOMY, PAP NO LONGER INDICATED  1994     LUMPECTOMY BREAST  2008     SALPINGO OOPHORECTOMY,R/L/SIMON  01/172011    Salpingo Oophorectomy, RT/LT/SIMON     TONSILLECTOMY  1991        Family History   Problem Relation Age of Onset     Blood Disease Mother         HHT     Allergies Father      Breast Cancer Maternal Grandmother      Cancer Maternal Grandmother         mouth     Allergies Sister      Allergies Paternal Uncle      Cancer - colorectal Maternal Uncle      Allergies Paternal Aunt        Social History     Socioeconomic History     Marital status:      Spouse name: Not on file     Number of children: Not on file     Years of education: Not on file     Highest education level: Not on file   Occupational History     Not on file   Tobacco Use     Smoking status: Current Every Day Smoker     Packs/day: 1.00     Years: 20.00     Pack  years: 20.00     Types: Cigarettes     Smokeless tobacco: Never Used     Tobacco comment: 2-3 a day - working on quitting   Substance and Sexual Activity     Alcohol use: Yes     Alcohol/week: 0.8 - 1.7 standard drinks     Types: 1 - 2 Standard drinks or equivalent per week     Drug use: No     Sexual activity: Yes     Partners: Male   Other Topics Concern     Parent/sibling w/ CABG, MI or angioplasty before 65F 55M? Not Asked   Social History Narrative     Not on file     Social Determinants of Health     Financial Resource Strain: Not on file   Food Insecurity: Not on file   Transportation Needs: Not on file   Physical Activity: Not on file   Stress: Not on file   Social Connections: Not on file   Intimate Partner Violence: Not on file   Housing Stability: Not on file       Outpatient Encounter Medications as of 5/26/2022   Medication Sig Dispense Refill     aspirin (ASA) 81 MG EC tablet Take 1 tablet (81 mg) by mouth daily 90 tablet 1     atorvastatin (LIPITOR) 40 MG tablet Take 1 tablet (40 mg) by mouth daily 30 tablet 0     buPROPion (WELLBUTRIN XL) 150 MG 24 hr tablet Take 1 tablet (150 mg) by mouth daily 90 tablet 3     escitalopram (LEXAPRO) 20 MG tablet Take 1 tablet (20 mg) by mouth daily 90 tablet 3     lisinopril (ZESTRIL) 20 MG tablet Take 1 tablet (20 mg) by mouth daily 90 tablet 1     metoprolol succinate ER (TOPROL-XL) 50 MG 24 hr tablet Take 1.5 tablets (75 mg) by mouth daily 90 tablet 0     omeprazole (PRILOSEC) 20 MG DR capsule Take 1 capsule (20 mg) by mouth every morning (before breakfast) INDICATION: TO CONTROL REFLUX SYMPTOMS, No further refills till seen in the office. 90 capsule 3     No facility-administered encounter medications on file as of 5/26/2022.             O:   NAD, WDWN, Alert & Oriented, Mood & Affect wnl, Vitals stable   Here today alone    General appearance normal   Vitals stable   Alert, oriented and in no acute distress     Perioral and orbital red scaly papule    Excoriations on face  Stuck on papules and brown macules on trunk and ext   Scar on right shin     Eyes: Conjunctivae/lids:Normal     ENT: Lips, buccal mucosa, tongue: normal    MSK:Normal    Cardiovascular: peripheral edema none    Pulm: Breathing Normal    Neuro/Psych: Orientation:Alert and Orientedx3 ; Mood/Affect:normal       A/P:  1. Seborrheic keratosis, lentigo, scar  2. Perioral dermatitis v excoriations  Pathophysiology discussed with pateint   Doxy twie daily  clairitn in am  Zyrtec bedtime  elidel twice daily  Return to clinic 6 weeks  It was a pleasure speaking to Linda Tomlin today.  Previous clinic notes and pertinent laboratory tests were reviewed prior to Linda Tomlin's visit.  Skin care regimen reviewed with patient: Eliminate harsh soaps, i.e. Dial, zest, irsih spring; Mild soaps such as Cetaphil or Dove sensitive skin, avoid hot or cold showers, aggressive use of emollients including vanicream, cetaphil or cerave discussed with patient.

## 2022-05-26 NOTE — LETTER
5/26/2022         RE: Linda Tomlin  [id number]  [facility] [address]        Dear Colleague,    Thank you for referring your patient, Linda Tomlin, to the [facility]. Please see a copy of my visit note below.    Linda Tomlin is an extremely pleasant 62 year old year old female patient I was asked to see by Dr. Doran for spots on face.  Patient states this has been present for a while.  Patient reports the following symptoms:  Itching and she admits to picking.  .  Patient reports the following previous treatments none.  These treatments did not work.  Patient reports the following modifying factors none.  Associated symptoms: none.  Patient has no other skin complaints today.  Remainder of the HPI, Meds, PMH, Allergies, FH, and SH was reviewed in chart.      Past Medical History:   Diagnosis Date     Breast cancer (H) 2009    lumpectomy and radiation x 35     Diverticular disease of colon      Ovarian cyst      TIA (transient ischemic attack) 1/10/2022       Past Surgical History:   Procedure Laterality Date     COLON SURGERY  [id number]    HEMICOLECTOMY     COLONOSCOPY N/A 1/3/2019    Procedure: COMBINED COLONOSCOPY, SINGLE OR MULTIPLE BIOPSY/POLYPECTOMY BY BIOPSY;  Surgeon: Mane Jay MD;  Location:  GI     HAND SURGERY  1996    Carpal Tunnel Finger/hand Surgery     HYSTERECTOMY, PAP NO LONGER INDICATED  1994     LUMPECTOMY BREAST  2008     SALPINGO OOPHORECTOMY,R/L/SIMON  01/172011    Salpingo Oophorectomy, RT/LT/SIMON     TONSILLECTOMY  1991        Family History   Problem Relation Age of Onset     Blood Disease Mother         HHT     Allergies Father      Breast Cancer Maternal Grandmother      Cancer Maternal Grandmother         mouth     Allergies Sister      Allergies Paternal Uncle      Cancer - colorectal Maternal Uncle      Allergies Paternal Aunt        Social History     Socioeconomic History     Marital status:      Spouse name:  Not on file     Number of children: Not on file     Years of education: Not on file     Highest education level: Not on file   Occupational History     Not on file   Tobacco Use     Smoking status: Current Every Day Smoker     Packs/day: 1.00     Years: 20.00     Pack years: 20.00     Types: Cigarettes     Smokeless tobacco: Never Used     Tobacco comment: 2-3 a day - working on quitting   Substance and Sexual Activity     Alcohol use: Yes     Alcohol/week: 0.8 - 1.7 standard drinks     Types: 1 - 2 Standard drinks or equivalent per week     Drug use: No     Sexual activity: Yes     Partners: Male   Other Topics Concern     Parent/sibling w/ CABG, MI or angioplasty before 65F 55M? Not Asked   Social History Narrative     Not on file     Social Determinants of Health     Financial Resource Strain: Not on file   Food Insecurity: Not on file   Transportation Needs: Not on file   Physical Activity: Not on file   Stress: Not on file   Social Connections: Not on file   Intimate Partner Violence: Not on file   Housing Stability: Not on file       Outpatient Encounter Medications as of 5/26/2022   Medication Sig Dispense Refill     aspirin (ASA) 81 MG EC tablet Take 1 tablet (81 mg) by mouth daily 90 tablet 1     atorvastatin (LIPITOR) 40 MG tablet Take 1 tablet (40 mg) by mouth daily 30 tablet 0     buPROPion (WELLBUTRIN XL) 150 MG 24 hr tablet Take 1 tablet (150 mg) by mouth daily 90 tablet 3     escitalopram (LEXAPRO) 20 MG tablet Take 1 tablet (20 mg) by mouth daily 90 tablet 3     lisinopril (ZESTRIL) 20 MG tablet Take 1 tablet (20 mg) by mouth daily 90 tablet 1     metoprolol succinate ER (TOPROL-XL) 50 MG 24 hr tablet Take 1.5 tablets (75 mg) by mouth daily 90 tablet 0     omeprazole (PRILOSEC) 20 MG DR capsule Take 1 capsule (20 mg) by mouth every morning (before breakfast) INDICATION: TO CONTROL REFLUX SYMPTOMS, No further refills till seen in the office. 90 capsule 3     No facility-administered encounter  medications on file as of 5/26/2022.             O:   NAD, WDWN, Alert & Oriented, Mood & Affect wnl, Vitals stable   Here today alone    General appearance normal   Vitals stable   Alert, oriented and in no acute distress     Perioral and orbital red scaly papule   Excoriations on face  Stuck on papules and brown macules on trunk and ext   Scar on right shin     Eyes: Conjunctivae/lids:Normal     ENT: Lips, buccal mucosa, tongue: normal    MSK:Normal    Cardiovascular: peripheral edema none    Pulm: Breathing Normal    Neuro/Psych: Orientation:Alert and Orientedx3 ; Mood/Affect:normal       A/P:  1. Seborrheic keratosis, lentigo, scar  2. Perioral dermatitis v excoriations  Pathophysiology discussed with pateint   Doxy twie daily  riky in am  Zyrtec bedtime  elidel twice daily  Return to clinic 6 weeks  It was a pleasure speaking to Linda Tomlin today.  Previous clinic notes and pertinent laboratory tests were reviewed prior to Linda Tomlin's visit.  Skin care regimen reviewed with patient: Eliminate harsh soaps, i.e. Dial, zest, irsih spring; Mild soaps such as Cetaphil or Dove sensitive skin, avoid hot or cold showers, aggressive use of emollients including vanicream, cetaphil or cerave discussed with patient.          Again, thank you for allowing me to participate in the care of your patient.        Sincerely,        Fuad Lomeli MD

## 2022-05-26 NOTE — PATIENT INSTRUCTIONS
Take 1 Claritin in the morning  Take 1 Zyrtec at night  Take doxycycline twice daily  Apply Elidel cream twice barboza to affected areas

## 2022-06-02 ENCOUNTER — NURSE TRIAGE (OUTPATIENT)
Dept: NURSING | Facility: CLINIC | Age: 62
End: 2022-06-02
Payer: COMMERCIAL

## 2022-06-02 NOTE — TELEPHONE ENCOUNTER
Lives with vulnerable person. Friend spent all day with her on Tuesday. That night she became sick and is covid-19 positive. When should she test and how does she keep her family safe? I told her she should test herself five to seven days from Tuesday to see if she's positive. If she is then she needs to isolate. She should wear a mask to help prevent any spread to her family, in the mean time. If she is positive, they will need to test 5-7 days from her test date or from onset of symptoms. She wanted to know what type of mask is best. I told her hospital masks are best but cloth is better than nothing. I told her to call back with any further questions or concerns.  Zoraida Reyna RN  [facility]        Additional Information    Negative: Nursing judgment    Negative: Nursing judgment    Negative: Nursing judgment    Negative: Nursing judgment    Information only question and nurse able to answer    Protocols used: INFORMATION ONLY CALL - NO TRIAGE-A-OH

## 2022-06-08 ENCOUNTER — PATIENT OUTREACH (OUTPATIENT)
Dept: NURSING | Facility: CLINIC | Age: 62
End: 2022-06-08
Payer: COMMERCIAL

## 2022-06-08 ASSESSMENT — ACTIVITIES OF DAILY LIVING (ADL): DEPENDENT_IADLS:: INDEPENDENT

## 2022-07-05 ENCOUNTER — TELEPHONE (OUTPATIENT)
Dept: DERMATOLOGY | Facility: CLINIC | Age: 62
End: 2022-07-05

## 2022-07-05 DIAGNOSIS — I10 PRIMARY HYPERTENSION: ICD-10-CM

## 2022-07-05 NOTE — TELEPHONE ENCOUNTER
[facility] Call Center    Phone Message    May a detailed message be left on voicemail: yes     Reason for Call: Other: Pt requests call back to see if she can get her Appt back - she cancelled 07/14/22 at 1:15pm with Dr Lomeli in [place] and then later in the call changed her mind and when I tried to reschedule it and get it back it would not let me because apparently Dr Lomeli was already previously overbooked and clinic had scheduled this Pt's Appt - please call Pt back either way - Thank you      Action Taken: Message routed to:  Other: OX DERM    Travel Screening: Not Applicable

## 2022-07-06 RX ORDER — LISINOPRIL 20 MG/1
20 TABLET ORAL DAILY
Qty: 90 TABLET | Refills: 2 | Status: SHIPPED | OUTPATIENT
Start: 2022-07-06 | End: 2023-03-02

## 2022-07-06 NOTE — TELEPHONE ENCOUNTER
Rescheduled appointment for same date and time-  Left detailed voice mail per patient request with this information.  Thank you,    Tracie PAINTERRN BSN  [facility]- [phone]

## 2022-07-07 ENCOUNTER — OFFICE VISIT (OUTPATIENT)
Dept: DERMATOLOGY | Facility: CLINIC | Age: 62
End: 2022-07-07
Payer: COMMERCIAL

## 2022-07-07 DIAGNOSIS — L71.0 PERIORAL DERMATITIS: Primary | ICD-10-CM

## 2022-07-07 PROCEDURE — 99212 OFFICE O/P EST SF 10 MIN: CPT | Performed by: DERMATOLOGY

## 2022-07-07 NOTE — PROGRESS NOTES
Linda Tomlin is an extremely pleasant 62 year old year old female patient here today for f/u perioral derm v excoriations face clear off of abx only using zyrtec at bedtime doing well.  Patient has no other skin complaints today.  Remainder of the HPI, Meds, PMH, Allergies, FH, and SH was reviewed in chart.      Past Medical History:   Diagnosis Date     Breast cancer (H) 2009    lumpectomy and radiation x 35     Diverticular disease of colon      Ovarian cyst      TIA (transient ischemic attack) 1/10/2022       Past Surgical History:   Procedure Laterality Date     COLON SURGERY  [id number]    HEMICOLECTOMY     COLONOSCOPY N/A 1/3/2019    Procedure: COMBINED COLONOSCOPY, SINGLE OR MULTIPLE BIOPSY/POLYPECTOMY BY BIOPSY;  Surgeon: Mane Jay MD;  Location:  GI     HAND SURGERY  1996    Carpal Tunnel Finger/hand Surgery     HYSTERECTOMY, PAP NO LONGER INDICATED  1994     LUMPECTOMY BREAST  2008     SALPINGO OOPHORECTOMY,R/L/SIMON  01/172011    Salpingo Oophorectomy, RT/LT/SIMON     TONSILLECTOMY  1991        Family History   Problem Relation Age of Onset     Blood Disease Mother         HHT     Allergies Father      Breast Cancer Maternal Grandmother      Cancer Maternal Grandmother         mouth     Allergies Sister      Allergies Paternal Uncle      Cancer - colorectal Maternal Uncle      Allergies Paternal Aunt        Social History     Socioeconomic History     Marital status:      Spouse name: Not on file     Number of children: Not on file     Years of education: Not on file     Highest education level: Not on file   Occupational History     Not on file   Tobacco Use     Smoking status: Current Every Day Smoker     Packs/day: 1.00     Years: 20.00     Pack years: 20.00     Types: Cigarettes     Smokeless tobacco: Never Used     Tobacco comment: 2-3 a day - working on quitting   Substance and Sexual Activity     Alcohol use: Yes     Alcohol/week: 0.8 - 1.7 standard drinks     Types: 1 - 2 Standard  drinks or equivalent per week     Drug use: No     Sexual activity: Yes     Partners: Male   Other Topics Concern     Parent/sibling w/ CABG, MI or angioplasty before 65F 55M? Not Asked   Social History Narrative     Not on file     Social Determinants of Health     Financial Resource Strain: Not on file   Food Insecurity: Not on file   Transportation Needs: Not on file   Physical Activity: Not on file   Stress: Not on file   Social Connections: Not on file   Intimate Partner Violence: Not on file   Housing Stability: Not on file       Outpatient Encounter Medications as of 7/7/2022   Medication Sig Dispense Refill     aspirin (ASA) 81 MG EC tablet Take 1 tablet (81 mg) by mouth daily 90 tablet 1     atorvastatin (LIPITOR) 40 MG tablet Take 1 tablet (40 mg) by mouth daily 30 tablet 0     buPROPion (WELLBUTRIN XL) 150 MG 24 hr tablet Take 1 tablet (150 mg) by mouth daily 90 tablet 3     doxycycline monohydrate (MONODOX) 100 MG capsule Take 1 capsule (100 mg) by mouth 2 times daily 60 capsule 6     escitalopram (LEXAPRO) 20 MG tablet Take 1 tablet (20 mg) by mouth daily 90 tablet 3     lisinopril (ZESTRIL) 20 MG tablet Take 1 tablet (20 mg) by mouth daily 90 tablet 2     metoprolol succinate ER (TOPROL-XL) 50 MG 24 hr tablet Take 1.5 tablets (75 mg) by mouth daily (Patient not taking: Reported on 5/26/2022) 90 tablet 0     omeprazole (PRILOSEC) 20 MG DR capsule Take 1 capsule (20 mg) by mouth every morning (before breakfast) INDICATION: TO CONTROL REFLUX SYMPTOMS, No further refills till seen in the office. 90 capsule 3     pimecrolimus (ELIDEL) 1 % external cream Apply topically 2 times daily 60 g 6     No facility-administered encounter medications on file as of 7/7/2022.             O:   NAD, WDWN, Alert & Oriented, Mood & Affect wnl, Vitals stable   Here today with      General appearance normal   Vitals stable   Alert, oriented and in no acute distress      Face clear      Eyes: Conjunctivae/lids:Normal      ENT: Lips, buccal mucosa, tongue: normal    MSK:Normal    Cardiovascular: peripheral edema none    Pulm: Breathing Normal    Neuro/Psych: Orientation:Alert and Orientedx3 ; Mood/Affect:normal       A/P:  1. Perioral derm v excoriatins all clear  It was a pleasure speaking to Linda Tomlin today.  [facility]  Previous clinic notes and pertinent laboratory tests were reviewed prior to Linda Tomlin's visit.  Skin care regimen reviewed with patient: Eliminate harsh soaps, i.e. Dial, zest, irsih spring; Mild soaps such as Cetaphil or Dove sensitive skin, avoid hot or cold showers, aggressive use of emollients including vanicream, cetaphil or cerave discussed with patient.    Return to clinic prn

## 2022-07-07 NOTE — LETTER
7/7/2022         RE: Linda Tomlin  [address]  [facility] [address]        Dear Colleague,    Thank you for referring your patient, Linda Tomlin, to the [facility]. Please see a copy of my visit note below.    Linda Tomlin is an extremely pleasant 62 year old year old female patient here today for f/u perioral derm v excoriations face clear off of abx only using zyrtec at bedtime doing well.  Patient has no other skin complaints today.  Remainder of the HPI, Meds, PMH, Allergies, FH, and SH was reviewed in chart.      Past Medical History:   Diagnosis Date     Breast cancer (H) 2009    lumpectomy and radiation x 35     Diverticular disease of colon      Ovarian cyst      TIA (transient ischemic attack) 1/10/2022       Past Surgical History:   Procedure Laterality Date     COLON SURGERY  [id number]    HEMICOLECTOMY     COLONOSCOPY N/A 1/3/2019    Procedure: COMBINED COLONOSCOPY, SINGLE OR MULTIPLE BIOPSY/POLYPECTOMY BY BIOPSY;  Surgeon: Mane Jay MD;  Location:  GI     HAND SURGERY  1996    Carpal Tunnel Finger/hand Surgery     HYSTERECTOMY, PAP NO LONGER INDICATED  1994     LUMPECTOMY BREAST  2008     SALPINGO OOPHORECTOMY,R/L/SIMON  01/172011    Salpingo Oophorectomy, RT/LT/SIMON     TONSILLECTOMY  1991        Family History   Problem Relation Age of Onset     Blood Disease Mother         HHT     Allergies Father      Breast Cancer Maternal Grandmother      Cancer Maternal Grandmother         mouth     Allergies Sister      Allergies Paternal Uncle      Cancer - colorectal Maternal Uncle      Allergies Paternal Aunt        Social History     Socioeconomic History     Marital status:      Spouse name: Not on file     Number of children: Not on file     Years of education: Not on file     Highest education level: Not on file   Occupational History     Not on file   Tobacco Use     Smoking status: Current Every Day Smoker     Packs/day: 1.00      Years: 20.00     Pack years: 20.00     Types: Cigarettes     Smokeless tobacco: Never Used     Tobacco comment: 2-3 a day - working on quitting   Substance and Sexual Activity     Alcohol use: Yes     Alcohol/week: 0.8 - 1.7 standard drinks     Types: 1 - 2 Standard drinks or equivalent per week     Drug use: No     Sexual activity: Yes     Partners: Male   Other Topics Concern     Parent/sibling w/ CABG, MI or angioplasty before 65F 55M? Not Asked   Social History Narrative     Not on file     Social Determinants of Health     Financial Resource Strain: Not on file   Food Insecurity: Not on file   Transportation Needs: Not on file   Physical Activity: Not on file   Stress: Not on file   Social Connections: Not on file   Intimate Partner Violence: Not on file   Housing Stability: Not on file       Outpatient Encounter Medications as of 7/7/2022   Medication Sig Dispense Refill     aspirin (ASA) 81 MG EC tablet Take 1 tablet (81 mg) by mouth daily 90 tablet 1     atorvastatin (LIPITOR) 40 MG tablet Take 1 tablet (40 mg) by mouth daily 30 tablet 0     buPROPion (WELLBUTRIN XL) 150 MG 24 hr tablet Take 1 tablet (150 mg) by mouth daily 90 tablet 3     doxycycline monohydrate (MONODOX) 100 MG capsule Take 1 capsule (100 mg) by mouth 2 times daily 60 capsule 6     escitalopram (LEXAPRO) 20 MG tablet Take 1 tablet (20 mg) by mouth daily 90 tablet 3     lisinopril (ZESTRIL) 20 MG tablet Take 1 tablet (20 mg) by mouth daily 90 tablet 2     metoprolol succinate ER (TOPROL-XL) 50 MG 24 hr tablet Take 1.5 tablets (75 mg) by mouth daily (Patient not taking: Reported on 5/26/2022) 90 tablet 0     omeprazole (PRILOSEC) 20 MG DR capsule Take 1 capsule (20 mg) by mouth every morning (before breakfast) INDICATION: TO CONTROL REFLUX SYMPTOMS, No further refills till seen in the office. 90 capsule 3     pimecrolimus (ELIDEL) 1 % external cream Apply topically 2 times daily 60 g 6     No facility-administered encounter medications on  file as of 7/7/2022.             O:   NAD, WDWN, Alert & Oriented, Mood & Affect wnl, Vitals stable   Here today with      General appearance normal   Vitals stable   Alert, oriented and in no acute distress      Face clear      Eyes: Conjunctivae/lids:Normal     ENT: Lips, buccal mucosa, tongue: normal    MSK:Normal    Cardiovascular: peripheral edema none    Pulm: Breathing Normal    Neuro/Psych: Orientation:Alert and Orientedx3 ; Mood/Affect:normal       A/P:  1. Perioral derm v excoriatins all clear  It was a pleasure speaking to Linda Tomlin today.  [facility]  Previous clinic notes and pertinent laboratory tests were reviewed prior to Linda Tomlin's visit.  Skin care regimen reviewed with patient: Eliminate harsh soaps, i.e. Dial, zest, irsih spring; Mild soaps such as Cetaphil or Dove sensitive skin, avoid hot or cold showers, aggressive use of emollients including vanicream, cetaphil or cerave discussed with patient.    Return to clinic prn         Again, thank you for allowing me to participate in the care of your patient.        Sincerely,        Fuad Lomeli MD

## 2022-07-11 ENCOUNTER — PATIENT OUTREACH (OUTPATIENT)
Dept: CARE COORDINATION | Facility: CLINIC | Age: 62
End: 2022-07-11

## 2022-07-11 ASSESSMENT — ACTIVITIES OF DAILY LIVING (ADL): DEPENDENT_IADLS:: INDEPENDENT

## 2022-07-11 NOTE — PROGRESS NOTES
Clinic Care Coordination Contact  [facility]/Voicemail    Referral Source: Care Team  Clinical Data: Care Coordinator Outreach  Outreach attempted x 1.  Left message on patient's voicemail with call back information and requested return call.    Plan: Care Coordinator will try to reach patient again in 10-14 business days.     Radha Arias RN, BSN, PHN  Primary Care / Care Coordinator   [facility]  E-mail [email or web address]   [phone]

## 2022-07-18 ENCOUNTER — HOSPITAL ENCOUNTER (OUTPATIENT)
Dept: NUTRITION | Facility: CLINIC | Age: 62
Discharge: HOME | End: 2022-07-18
Attending: INTERNAL MEDICINE | Admitting: INTERNAL MEDICINE
Payer: COMMERCIAL

## 2022-07-18 PROCEDURE — 97803 MED NUTRITION INDIV SUBSEQ: CPT | Mod: GT,95

## 2022-07-18 NOTE — PROGRESS NOTES
OUTPATIENT CLINICAL NUTRITION SERVICES FOLLOW-UP     REASON FOR ASSESSMENT  Linda K Sada referred by Dr. Kennedy for MNT related to Acute ischemic stroke (H)     Patient accompanied by herself        NEW FINDINGS:   Patient is traveling to [place] and [place] in august  Patient is improving on balanced meals  Patient reports bulking up her vegetable intakes  Patient would like to try a nicotine patch to assist her in quitting smoking  Patient would like to incorporate more walking    Previous Nutrition Goals:  1) Patient wants to quit smoking; wants to quit cold turkey   Evaluation: Not met, improving    2) Patient would like to incorporate more water  Evaluation: ongoing, progressing    3) Patient would like to incorporate Myplate breakfast and lunch  Evaluation: ongoing, progressing    Previous Nutrition Follow Up / Monitoring:  Progress towards goals will be monitored and evaluated per protocol and Practice Guidelines    Previous Nutrition Diagnosis:  Food and nutrition-related knowledge deficit related to lack of previous nutrition related knowledge as evidenced by Patient recall/ request for nutrition information.    Evaluation: No change      Newest Food Record  Breakfast: Bagel with peanut butter and fruit   Lunch: sometimes skipped or cottage cheese with batsheva fruit   Dinner: Meat, vegetable and starch portion; most balanced meal  Snack: One piece of chocolate at night; nighttime snacking decreasing   Beverages: 4 cups of water, one small can of pop  Dining out: rarely        Physical Activity:  Days per week: 7  Duration: 1-2 hours  Activity type: gardening  Limitations:       ANTHROPOMETRICS   Height: 5' 3''  Weight: 67.6 kg  BMI (kg/m2): 26.4  Weight Status:  Overweight BMI  IBW: 52.3 kg (115 lb)  %IBW: 129%  Weight History:         Wt Readings from Last 15 Encounters:   03/01/22 67.8 kg (149 lb 6.4 oz)   03/01/22 67.8 kg (149 lb 6.4 oz)   02/03/22 68 kg (150 lb)   01/13/22 66.8 kg (147 lb 3.2 oz)    01/10/22 68 kg (149 lb 14.4 oz)   10/06/21 64.9 kg (143 lb)   08/07/15 72.8 kg (160 lb 9.6 oz)   02/28/15 71.2 kg (157 lb)   01/07/15 75.8 kg (167 lb)   10/10/14 74 kg (163 lb 3.2 oz)   05/22/14 73.6 kg (162 lb 4.8 oz)   04/23/14 72.6 kg (160 lb)   04/11/14 73.3 kg (161 lb 8 oz)   03/24/14 72.6 kg (160 lb 1.6 oz)   01/31/14 72.6 kg (160 lb)   Weight appears to be trending back up; no significant weight loss noted.      Dosing weight: 56.3 kg-adjusted BW used        ASSESSED NUTRITION NEEDS  Estimated Energy Needs: 1,126-1,407 kcals/day (20-25 Kcal/Kg)  Justification:  (overweight)  Estimated Protein Needs: 45-56 grams protein/day (0.8-1 g pro/Kg)  Justification:  (maintenance)  Estimated Fluid Needs: 1,126-1,407 mL/day (1 mL/Kcal)       DIAGNOSIS   Nutrition Diagnosis: Food and nutrition-related knowledge deficit related to lack of previous nutrition related knowledge as evidenced by Patient recall/ request for nutrition information.      INTERVENTIONS   Nutrition Prescription mediterranean eating guidelines, mindful eating, myplate    IMPLEMENTATION   Assessed learning needs and learning preference:  Teaching Method(s) used: Explanation    Nutrition Education (Content):              a)  Discussed ways to increase vegetable intakes, physical activity, ways to increase water intakes, mindful eating               Nutrition Education (Application):              a)  Discussed current eating plans / recommended alternative food choices              b)  Patient verbalizes understanding of diet by listing ways to create a balanced meal, listing 2-3 tips to encourage water intake    Anticipate good compliance   Stage of Change:  action  Additional:     GOALS  1) Patient will quit smoking; receive a prescription for a patch  2) Patient will mindfully eat balanced meals   3) Patient will mindfully incorporate more water  4) Patient will incorporate more walking      FOLLOW UP/MONITORING   Progress towards goals will be  monitored and evaluated per protocol and Practice Guidelines    Time Spent with Patient  45 minutes    Misa Rolno RDN, LD  Clinical Dietitian  Office: [phone]  Weekend pager: [phone]

## 2022-07-29 ENCOUNTER — PATIENT OUTREACH (OUTPATIENT)
Dept: CARE COORDINATION | Facility: CLINIC | Age: 62
End: 2022-07-29

## 2022-07-29 ASSESSMENT — ACTIVITIES OF DAILY LIVING (ADL): DEPENDENT_IADLS:: INDEPENDENT

## 2022-07-29 NOTE — PROGRESS NOTES
Clinic Care Coordination Contact  [facility]/Voicemail    Referral Source: Care Team  Clinical Data: Care Coordinator Outreach  Outreach attempted x 2.  Left message on patient's voicemail with call back information and requested return call.    Plan: Care Coordinator will try to reach patient again in 1 month.     Radha Arias RN, BSN, PHN  Primary Care / Care Coordinator   [facility]  E-mail [email or web address]   [phone]

## 2022-09-26 ENCOUNTER — PATIENT OUTREACH (OUTPATIENT)
Dept: NURSING | Facility: CLINIC | Age: 62
End: 2022-09-26

## 2022-09-26 ASSESSMENT — ACTIVITIES OF DAILY LIVING (ADL): DEPENDENT_IADLS:: INDEPENDENT

## 2022-09-26 NOTE — PROGRESS NOTES
"Clinic Care Coordination Contact    Follow Up Progress Note      Assessment:   Patient states she is currently at the [facility] waiting for a meeting and is \"happy\" that RNCC reached out to her.  Patient is in a \"slump\" and has been for the past 2 months.  Patient is still waiting to schedule her steroid injections to her feet; her insurance will not cover another MRI which is needed before procedure can be completed to bilateral feet, so patient is looking into scheduling a \"MSK ultrasound\" which would suffice Ortho.    Due to being in pain, the patient has \"really done anything\" these past two months.  She states she can't sleep and will fall asleep around 4 am in the morning and will get up around noon.  She's only eating one meal per day and is not eating healthy.  Patient states her  Andrés, is doing so much better and doesn't rely on her as much.  She hasn't been in her garden for about a month and the \"weeds have taken over\" and now she's overwhelmed at the work ahead of her.    RNCC encouraged to possibly make a \"to do\" list of little things to accomplish in a day; make the bed, one load of laundry, call a friend, reconnect with a friend...etc.  RNCC suggested \"[employer]\" pod cast that daily prayers and reflections.  Patient states she is being called into the meeting and needs to go adding, she will consider the above suggestions.      Care Gaps:    Health Maintenance Due   Topic Date Due     ADVANCE CARE PLANNING  Never done     Pneumococcal Vaccine: Pediatrics (0 to 5 Years) and At-Risk Patients (6 to 64 Years) (2 - PCV) 06/15/2013     PREVENTIVE CARE VISIT  01/07/2014     DEXA  05/30/2015     DTAP/TDAP/TD IMMUNIZATION (2 - Td or Tdap) 01/26/2022     COVID-19 Vaccine (4 - Booster for Pfizer series) 02/22/2022     PHQ-9  09/01/2022     INFLUENZA VACCINE (1) 09/01/2022       Postponed to next RNCC outreach     Care Plans  Care Plan: Healthy eating     Problem: Lifestyle choices     " "Goal: Healthy meal planning and shopping     Start Date: 1/14/2022 Expected End Date: 11/14/2022    Note:     Goal Statement: I would like assistance and support in maintaining my health and wellness to eat healthy and have a written guide for healthy meal planning and shopping.      Barriers: Patient does not cook or goes grocery shopping; her  did all the cooking  Strengths: Strong advocate for self; strong family support  Date to Achieve By: 7/14/2021 //  Modified, edited 11/14/2022  Patient expressed understanding of goal: Yes  Action steps to achieve this goal:  1. I will follow through with the Registered Dietician's recommendations/suggestions for meal planning  2. I will go to the grocery store and buy protein items that do not need to cooked (yogurt, Ensure/Boost, peanut butter, vegetables in small bulk, protein bars, salad mixes...etc)  3. I will use my new Ninja that I recently purchased and read the cook book it came with  4. I will remember to eat by utilizing a \"Paper\" chart on my fridge which is a visual reminder to eat  5. I will review daily the written daily meal planning calendar that will be placed on my fridge  6. I will contact my care team with questions, concerns, support needs   7. I will use the clinic as a resource, and I understand I can contact my clinic with 24/7 after hours services available   8. Care Coordinator will remain available as needed                        Care Plan: Tobacco cessation     Problem: Lifestyle choices     Goal: Changes in Lifestyle     Start Date: 1/14/2022 Expected End Date: 11/14/2022    Note:     Barriers: Smoker for years; Patient quit cold turkey on 1/9/2022; relapse February 2022  Strengths: Strong advocate for self, motivated, engaged, scared after January 2022 hospitalization  Patient expressed understanding of goal: Yes  Action steps to achieve this goal:  1. I will continue to \"keep busy\" and not think or talk about smoking  2. I will continue " quitting cold turkey  3. I will apply a Nicotine patch if I can't control the urge  4. I will  place a Nicorette gum to the inside of my cheek every hour until the urge passes  5. I will contact my care team with questions, concerns, support needs   6. Care Coordinator will remain available as needed                      Intervention/Education provided during outreach:   RNCC called and spoke with patient; introduced self, discussed role of Care Coordination and explained reason for call    Plan:   -Patient will contact the care team with questions, concerns, support needs   -Patient will use the clinic as a resource and understands (s)he can contact the [facility] with 24/7 after hours services available  -Care Coordinator will remain available as needed  -RNCC will follow up in one month for follow up appointments/recommendations and goal progression     Radha Arias RN, BSN, PHN  Primary Care / Care Coordinator   [facility]  [email or web address]   [phone]

## 2022-09-26 NOTE — LETTER
[address] [facility] [address]    [facility]  Patient Centered Plan of Care  About Me:        Patient Name:  Linda Cheng    YOB: 1960  Age:         62 year old   Whit MRN:    [id number] Telephone Information:  Home Phone [phone]   Mobile [phone]       Address:  [address] [place] [facility] [address] Email address:  [email or web address]      Emergency Contact(s)    Name Relationship Lgl Grd Work Phone Home Phone Mobile Phone   1. LUIS CHENG* Spouse    [phone]   2. IVY JOYA Mother   [phone]            Primary language:  English     needed? No   [facility] [employer]:  [phone] op. 1  Other communication barriers:None    Preferred Method of Communication:  Ximena  Current living arrangement: I live in a private home with family (Patient's 80 year old mother lives with patient)    Mobility Status/ Medical Equipment: Independent    Health Maintenance  Health Maintenance Reviewed: Due/Overdue   Health Maintenance Due   Topic Date Due     ADVANCE CARE PLANNING  Never done     Pneumococcal Vaccine: Pediatrics (0 to 5 Years) and At-Risk Patients (6 to 64 Years) (2 - PCV) 06/15/2013     PREVENTIVE CARE VISIT  01/07/2014     DEXA  05/30/2015     DTAP/TDAP/TD IMMUNIZATION (2 - Td or Tdap) 01/26/2022     COVID-19 Vaccine (4 - Booster for Pfizer series) 02/22/2022     PHQ-9  09/01/2022     INFLUENZA VACCINE (1) 09/01/2022     My Access Plan  Medical Emergency 911   Primary Clinic Line [facility] - [phone]   24 Hour Appointment Line [phone] or  [phone]) (toll-free)   24 Hour Nurse Line [phone] (toll-free)   Preferred Urgent Care [facility], [phone]     Preferred Hospital [facility]  [phone]     Preferred Pharmacy [id number] - [place], [place] - [address]     Behavioral Health Crisis Line The  National Suicide Prevention Lifeline at 1-844.209.6806 or Text/Call 988     My Care Team Members  Patient Care Team       Relationship Specialty Notifications Start End    Eliceo Doran MD PCP - General Internal Medicine  3/1/22     Phone: [phone] Fax: [phone]         [address] [place] [address]    Hill Francois MD Assigned Heart and Vascular Provider   10/17/21     Phone: [phone] Fax: [phone] [address] [place] [address]    Radha Arias, RN Lead Care Coordinator  Admissions 1/14/22     Eliceo Doran MD Assigned PCP   1/23/22     Phone: [phone] Fax: [phone]         [address] [place] [address]    Stefan Rdz DO Assigned Neuroscience Provider   3/20/22     Phone: [phone] Fax: [phone]         [address] [facility] [address]    Fuad Lomeli MD Assigned Surgical Provider   7/16/22     Phone: [phone] Pager: [phone] Fax: [phone] [address] [facility] [address]            My Care Plans  Self Management and Treatment Plan  Care Plan  Care Plan: Healthy eating     Problem: Lifestyle choices     Goal: Healthy meal planning and shopping     Start Date: 1/14/2022 Expected End Date: 11/14/2022    Note:     Goal Statement: I would like assistance and support in maintaining my health and wellness to eat healthy and have a written guide for healthy meal planning and shopping.      Barriers: Patient does not cook or goes grocery shopping; her  did all the cooking  Strengths: Strong advocate for self; strong family support  Date to Achieve By: 7/14/2021 //  Modified, edited 11/14/2022  Patient expressed understanding of goal: Yes  Action steps to achieve this goal:  1. I will follow through with the Registered Dietician's recommendations/suggestions for meal planning  2. I will go to the grocery store and buy protein items that do not need to cooked (yogurt,  "Ensure/Boost, peanut butter, vegetables in small bulk, protein bars, salad mixes...etc)  3. I will use my new Ninja that I recently purchased and read the cook book it came with  4. I will remember to eat by utilizing a \"Paper\" chart on my fridge which is a visual reminder to eat  5. I will review daily the written daily meal planning calendar that will be placed on my fridge  6. I will contact my care team with questions, concerns, support needs   7. I will use the clinic as a resource, and I understand I can contact my clinic with 24/7 after hours services available   8. Care Coordinator will remain available as needed                        Care Plan: Tobacco cessation     Problem: Lifestyle choices     Goal: Changes in Lifestyle     Start Date: 1/14/2022 Expected End Date: 11/14/2022    Note:     Barriers: Smoker for years; Patient quit cold turkey on 1/9/2022; relapse February 2022  Strengths: Strong advocate for self, motivated, engaged, scared after January 2022 hospitalization  Patient expressed understanding of goal: Yes  Action steps to achieve this goal:  1. I will continue to \"keep busy\" and not think or talk about smoking  2. I will continue quitting cold turkey  3. I will apply a Nicotine patch if I can't control the urge  4. I will  place a Nicorette gum to the inside of my cheek every hour until the urge passes  5. I will contact my care team with questions, concerns, support needs   6. Care Coordinator will remain available as needed                         Action Plans on File:     Advance Care Plans/Directives Type:   -- (Full Code)      My Medical and Care Information  Problem List    Current Medications and Allergies:    Allergies   Allergen Reactions     Percocet [Oxycodone-Acetaminophen] Rash     Chantix [Varenicline Tartrate] Nausea and Vomiting     SEVERE VOMITING     Current Outpatient Medications   Medication     aspirin (ASA) 81 MG EC tablet     atorvastatin (LIPITOR) 40 MG tablet     " buPROPion (WELLBUTRIN XL) 150 MG 24 hr tablet     doxycycline monohydrate (MONODOX) 100 MG capsule     escitalopram (LEXAPRO) 20 MG tablet     lisinopril (ZESTRIL) 20 MG tablet     metoprolol succinate ER (TOPROL-XL) 50 MG 24 hr tablet     omeprazole (PRILOSEC) 20 MG DR capsule     pimecrolimus (ELIDEL) 1 % external cream     No current facility-administered medications for this visit.     Care Coordination Start Date: 1/14/2022   Frequency of Care Coordination: monthly     Form Last Updated: 09/26/2022

## 2022-10-15 ENCOUNTER — HEALTH MAINTENANCE LETTER (OUTPATIENT)
Age: 62
End: 2022-10-15

## 2022-10-25 ENCOUNTER — PATIENT OUTREACH (OUTPATIENT)
Dept: CARE COORDINATION | Facility: CLINIC | Age: 62
End: 2022-10-25

## 2022-10-25 ASSESSMENT — ACTIVITIES OF DAILY LIVING (ADL): DEPENDENT_IADLS:: INDEPENDENT

## 2022-10-25 NOTE — PROGRESS NOTES
Clinic Care Coordination Contact  [facility]/Voicemail    Referral Source: Care Team  Clinical Data: Care Coordinator Outreach  Outreach attempted x 1.  Left message on patient's voicemail with call back information and requested return call.  Plan: Care Coordinator will try to reach patient again in 1 month.     Radha Arias RN, BSN, PHN  Primary Care / Care Coordinator   [facility]  E-mail [email or web address]   [phone]

## 2022-11-14 ENCOUNTER — E-VISIT (OUTPATIENT)
Dept: INTERNAL MEDICINE | Facility: CLINIC | Age: 62
End: 2022-11-14
Payer: COMMERCIAL

## 2022-11-14 DIAGNOSIS — J06.9 UPPER RESPIRATORY TRACT INFECTION, UNSPECIFIED TYPE: Primary | ICD-10-CM

## 2022-11-14 PROCEDURE — 99421 OL DIG E/M SVC 5-10 MIN: CPT | Performed by: INTERNAL MEDICINE

## 2022-11-15 ENCOUNTER — LAB (OUTPATIENT)
Dept: URGENT CARE | Facility: URGENT CARE | Age: 62
End: 2022-11-15
Attending: INTERNAL MEDICINE
Payer: COMMERCIAL

## 2022-11-15 DIAGNOSIS — J06.9 UPPER RESPIRATORY TRACT INFECTION, UNSPECIFIED TYPE: ICD-10-CM

## 2022-11-15 LAB
DEPRECATED S PYO AG THROAT QL EIA: NEGATIVE
FLUAV AG SPEC QL IA: NEGATIVE
FLUBV AG SPEC QL IA: POSITIVE
GROUP A STREP BY PCR: NOT DETECTED
SARS-COV-2 RNA RESP QL NAA+PROBE: POSITIVE

## 2022-11-15 PROCEDURE — 87804 INFLUENZA ASSAY W/OPTIC: CPT

## 2022-11-15 PROCEDURE — U0005 INFEC AGEN DETEC AMPLI PROBE: HCPCS

## 2022-11-15 PROCEDURE — 87651 STREP A DNA AMP PROBE: CPT

## 2022-11-15 PROCEDURE — U0003 INFECTIOUS AGENT DETECTION BY NUCLEIC ACID (DNA OR RNA); SEVERE ACUTE RESPIRATORY SYNDROME CORONAVIRUS 2 (SARS-COV-2) (CORONAVIRUS DISEASE [COVID-19]), AMPLIFIED PROBE TECHNIQUE, MAKING USE OF HIGH THROUGHPUT TECHNOLOGIES AS DESCRIBED BY CMS-2020-01-R: HCPCS

## 2022-11-15 NOTE — PROGRESS NOTES
Called and inform patient that recent [employer] message was sent prior to an updated result noted.  Patient's influenza test with positive with a negative strep test.  Patient informs me that she also had a positive COVID antigen test with a COVID PCR study still pending.    Discussed issues of isolation and quarantine protocol.  Discussed mask wearing.  Discussed options of treatment, Tamiflu, Paxil event etc. discussed dosing, side effects and indication and treatment windows.  Patient at the present time is NOT interested in treatment with either medication.

## 2022-11-21 ENCOUNTER — PATIENT OUTREACH (OUTPATIENT)
Dept: CARE COORDINATION | Facility: CLINIC | Age: 62
End: 2022-11-21

## 2022-11-21 ASSESSMENT — ACTIVITIES OF DAILY LIVING (ADL): DEPENDENT_IADLS:: INDEPENDENT

## 2022-11-22 ENCOUNTER — NURSE TRIAGE (OUTPATIENT)
Dept: NURSING | Facility: CLINIC | Age: 62
End: 2022-11-22

## 2022-11-22 ENCOUNTER — TELEPHONE (OUTPATIENT)
Dept: NURSING | Facility: CLINIC | Age: 62
End: 2022-11-22

## 2022-11-23 NOTE — TELEPHONE ENCOUNTER
Called earlier as PCP had asked her to call. ( She had been told to call so she could be triaged which she was)  Had requested sleep med and cold medicine due to having Covid. She is calling now to say she did not really talk to prior nurse enough about her Rib pain she has had all week. Thinks it is from coughing. No respiratory distress but says pain is bringing tears to her eyes. Protocol reviewed and care advice given. Transferred to  to make appointment with PCP. To call back with worsening symptoms or further questions/concerns.     Will route to PCP/Care team with update. Patient does have a video visit tomorrow, 11/23.    BETSEY DIAZ RN  [facility] nurse advisors  11/22/2022  8:15 PM      Reason for Disposition    Chest injury    [1] After 72 hours AND [2] chest pain not improving    Additional Information    Negative: Major injury from dangerous force or speed (e.g., MVA, fall > 10 feet or 3 meters)    Negative: Bullet wound, knife wound, or other penetrating object    Negative: Puncture wound that sounds life-threatening to the triager    Negative: SEVERE difficulty breathing (e.g., struggling for each breath, speaks in single words)    Negative: [1] Major bleeding (e.g., actively dripping or spurting) AND [2] can't be stopped    Negative: Open wound of the chest with sound of moving air (sucking wound) or visible air bubbles    Negative: Shock suspected (e.g., cold/pale/clammy skin, too weak to stand, low BP, rapid pulse)    Negative: Coughing or spitting up blood    Negative: Bluish (or gray) lips or face now    Negative: Unconscious or was unconscious    Negative: Sounds like a life-threatening emergency to the triager    Negative: SEVERE chest pain    Negative: [1] Difficulty breathing AND [2] not severe    Negative: Skin is split open or gaping    Negative: [1] Bleeding AND [2] won't stop after 10 minutes of direct pressure (using correct technique)    Negative: Sounds like a  serious injury to the triager    Negative: [1] Can't take a deep breath BUT [2] no respiratory distress    Negative: Shallow puncture wound    Negative: [1] Collarbone is painful AND [2] difficulty raising arm    Negative: Suspicious history for the injury    Negative: Patient is confused or is an unreliable provider of information (e.g., dementia, severe intellectual disability, alcohol intoxication)    Negative: [1] High-risk adult (e.g., age > 60 years, osteoporosis, chronic steroid use) AND [2] still hurts    Negative: [1] No prior tetanus shots (or is not fully vaccinated) AND [2] any wound (e.g., cut, scrape)    Negative: [1] HIV positive or severe immunodeficiency (severely weak immune system) AND [2] DIRTY cut or scrape    Negative: [1] Last tetanus shot > 5 years ago AND [2] DIRTY cut or scrape    Negative: [1] Last tetanus shot > 10 years ago AND [2] CLEAN cut or scrape (e.g., object AND skin were clean)    Protocols used: INJURY - MULTIPLE SITES - GUIDELINE [id number]-LI, CHEST INJURY-A-AH

## 2022-11-23 NOTE — TELEPHONE ENCOUNTER
Agree that if patient is having symptomatology that she should best be seen for assessment.    Patient was informed of positive studies but was not interested in oral therapy per discussion.    Prior discussion:  Discussed issues of isolation and quarantine protocol. Discussed mask wearing.  Discussed options of treatment, Tamiflu, Paxil event etc. discussed dosing, side effects and indication and treatment windows.  Patient at the present time is NOT interested in treatment with either medication

## 2022-11-23 NOTE — TELEPHONE ENCOUNTER
Relayed providers message to patient. Patient decided to cancel today's VV with alternative provider and agrees she is not interested in paxlovid. See other Triage encounter, patient is requesting cough medicine from pcp.

## 2022-11-29 ENCOUNTER — TRANSFERRED RECORDS (OUTPATIENT)
Dept: HEALTH INFORMATION MANAGEMENT | Facility: CLINIC | Age: 62
End: 2022-11-29

## 2022-11-29 ENCOUNTER — MEDICAL CORRESPONDENCE (OUTPATIENT)
Dept: HEALTH INFORMATION MANAGEMENT | Facility: CLINIC | Age: 62
End: 2022-11-29

## 2022-12-02 ENCOUNTER — TELEPHONE (OUTPATIENT)
Dept: OTHER | Facility: CLINIC | Age: 62
End: 2022-12-02

## 2022-12-02 ENCOUNTER — PATIENT OUTREACH (OUTPATIENT)
Dept: NURSING | Facility: CLINIC | Age: 62
End: 2022-12-02
Payer: COMMERCIAL

## 2022-12-02 DIAGNOSIS — R09.89: Primary | ICD-10-CM

## 2022-12-02 DIAGNOSIS — R09.89: ICD-10-CM

## 2022-12-02 ASSESSMENT — ACTIVITIES OF DAILY LIVING (ADL): DEPENDENT_IADLS:: INDEPENDENT

## 2022-12-02 NOTE — TELEPHONE ENCOUNTER
Referral received via fax on 12/1/22.    Pt referred to [facility] by Dr. Kyung Dudley for vascular surgery clearance prior to toenail removal; non palpable PT pulse and claudication symptoms.    Pt needs to be scheduled for ZAHRA/TBI and consult with Vascular Surger.  Will route to scheduling to coordinate an appointment at next available.    Appt note: Ref by Dr. Kyung Dudley for vascular surgery clearance prior to toenail removal; non palpable PT pulse and claudication symptoms; notes sent to [facility] to be scanned.    Valeria Fiedls, BSN, RN-[facility] [place]

## 2022-12-02 NOTE — PROGRESS NOTES
"Clinic Care Coordination Contact  [facility]/Voicemail    Referral Source: Care Team  Clinical Data: Care Coordinator Outreach  Outreach attempted x 3.  Left message on patient's voicemail with call back information and requested return call.  RNCC scheduled this phone appointment with patient; patient stated to call \"anytime in the afternoon\".    Plan: Care Coordinator will try to reach patient again in 1-2 business days.     Radha Arias RN, BSN, PHN  Primary Care / Care Coordinator   [facility]  E-mail [email or web address]   [phone]      "

## 2022-12-06 NOTE — TELEPHONE ENCOUNTER
Future Appointments   Date Time Provider Department Center   12/19/2022  1:45 PM [facility]   12/19/2022  2:30 PM Abilio Walters MD [facility]

## 2022-12-19 ENCOUNTER — OFFICE VISIT (OUTPATIENT)
Dept: OTHER | Facility: CLINIC | Age: 62
End: 2022-12-19
Attending: SURGERY
Payer: COMMERCIAL

## 2022-12-19 ENCOUNTER — HOSPITAL ENCOUNTER (OUTPATIENT)
Dept: ULTRASOUND IMAGING | Facility: CLINIC | Age: 62
Discharge: HOME | End: 2022-12-19
Attending: SURGERY
Payer: COMMERCIAL

## 2022-12-19 VITALS — DIASTOLIC BLOOD PRESSURE: 79 MMHG | SYSTOLIC BLOOD PRESSURE: 122 MMHG | HEART RATE: 80 BPM

## 2022-12-19 DIAGNOSIS — R09.89: ICD-10-CM

## 2022-12-19 DIAGNOSIS — D21.20 FIBROMA OF FOOT, UNSPECIFIED LATERALITY: Primary | ICD-10-CM

## 2022-12-19 PROCEDURE — G0463 HOSPITAL OUTPT CLINIC VISIT: HCPCS

## 2022-12-19 PROCEDURE — 99203 OFFICE O/P NEW LOW 30 MIN: CPT | Performed by: SURGERY

## 2022-12-19 PROCEDURE — 93924 LWR XTR VASC STDY BILAT: CPT | Mod: 26 | Performed by: SURGERY

## 2022-12-19 PROCEDURE — 99212 OFFICE O/P EST SF 10 MIN: CPT | Performed by: SURGERY

## 2022-12-19 PROCEDURE — 93924 LWR XTR VASC STDY BILAT: CPT

## 2022-12-19 NOTE — PROGRESS NOTES
[facility]        Patient is here for a  follow up.     Pt is currently taking Aspirin.    /79 (BP Location: Right arm, Patient Position: Chair, Cuff Size: Adult Regular)   Pulse 80     The provider has been notified that the patient has no concerns.     Questions patient would like addressed today are: N/A.    Refills are needed: N/A    Has homecare services and agency name:  Nona Cannon MA

## 2022-12-19 NOTE — PROGRESS NOTES
I had the pleasure of seeing Mrs. Linda Tomlin in the vascular surgery clinic today.  This is a referral from Dr. Kyung Dudley in [place] [facility].  Patient describes her foot condition as fibrotic growths which requires surgical intervention under the care of Dr. Dudley.  According to the patient dorsalis pedis pulses were not easily located but posterior tibial pulses were difficult to find.  She has not had any problems with ischemic rest pain, tissue loss or wound healing in either of her feet.    He also does not have any claudication.    Past medical history is notable for low back pain, acute ischemic stroke which happened in the beginning part of this year, hyperlipidemia, plantar fibroma and depression.    Social history: She does not smoke or drink.    On examination: She appears comfortable and she is in no acute distress.  Vital signs reviewed.  Mental: Alert and oriented x4, judgment and insight are good.  Cardiac: Regular rate and rhythm, S1 plus S2 +0.  Chest: Clear to auscultation bilaterally.  Vascular: 3+ bilateral radial and femoral pulses.  Bilateral dorsalis pedis pulses are 3+ palpable.  Posterior tibial pulses were not readily palpable but had biphasic signals with Doppler.    Noninvasive arterial studies today show no evidence of arterial insufficiency in either lower extremity at rest or following exercise.    Diagnosis: Plantar fibromas.    Plan: I explained to the patient that sometimes the posterior tibial artery can be hard to locate as it is located behind the medial malleolus.  Nevertheless it has good significant clinical examination and noninvasive arterial studies showed that there is no evidence of arterial insufficiency in either lower extremity.  Her toe pressures are also normal.  From the vascular surgery standpoint she can proceed with her plantar fibroma surgery under the care of Dr. Dudley.    She did asked me to look into the stroke she had earlier this year.  I  explained that the echocardiogram, event monitor and CT angiogram of the head and neck were all normal.

## 2022-12-20 ENCOUNTER — PATIENT OUTREACH (OUTPATIENT)
Dept: NURSING | Facility: CLINIC | Age: 62
End: 2022-12-20
Payer: COMMERCIAL

## 2022-12-20 ASSESSMENT — ACTIVITIES OF DAILY LIVING (ADL): DEPENDENT_IADLS:: INDEPENDENT

## 2022-12-20 NOTE — PROGRESS NOTES
"Clinic Care Coordination Contact    Follow Up Progress Note      Assessment:   Patient requested that RNCC review her medications and indications, states she was at her Vascular Surgery yesterday and was \"embarassed\" because she didn't know what medications she is taking.  RNCC reviewed with patient and patient verbalized her understanding.    Patient reports that she received a \"a clean bill of health vascular-wise\" and she is happy she continue with getting her feet \"fixed\".  Patient has bilateral fibrotic growths and will be calling Dr. Kyung Dudley at [facility] to schedule the Plantar Fibroma surgery.    Care Gaps:    Health Maintenance Due   Topic Date Due     ADVANCE CARE PLANNING  Never done     Pneumococcal Vaccine: Pediatrics (0 to 5 Years) and At-Risk Patients (6 to 64 Years) (2 - PCV) 06/15/2013     YEARLY PREVENTIVE VISIT  01/07/2014     DEXA  05/30/2015     DTAP/TDAP/TD IMMUNIZATION (2 - Td or Tdap) 01/26/2022     COVID-19 Vaccine (4 - Booster for Pfizer series) 02/22/2022     INFLUENZA VACCINE (1) 09/01/2022       Care Gaps Last addressed on 12/20/2022    Care Plans  Care Plan: Healthy eating     Problem: Lifestyle choices     Goal: Healthy meal planning and shopping     Start Date: 1/14/2022 Expected End Date: 3/15/2023    Note:     Goal Statement: I would like assistance and support in maintaining my health and wellness to eat healthy and have a written guide for healthy meal planning and shopping.      Barriers: Patient does not cook or goes grocery shopping; her  did all the cooking  Strengths: Strong advocate for self; strong family support  Patient expressed understanding of goal: Yes  Action steps to achieve this goal:  1. I will follow through with the Registered Dietician's recommendations/suggestions for meal planning  2. I will go to the grocery store and buy protein items that do not need to cooked (yogurt, Ensure/Boost, peanut butter, vegetables in small bulk, protein bars, " "salad mixes...etc)  3. I will use my new Ninja that I recently purchased and read the cook book it came with  4. I will remember to eat by utilizing a \"Paper\" chart on my fridge which is a visual reminder to eat  5. I will review daily the written daily meal planning calendar that will be placed on my fridge  6. I will contact my care team with questions, concerns, support needs   7. I will use the clinic as a resource, and I understand I can contact my clinic with 24/7 after hours services available                     Care Plan: Tobacco cessation     Problem: Lifestyle choices     Goal: Changes in Lifestyle     Start Date: 1/14/2022 Expected End Date: 12/15/2022    Note:     Barriers: Smoker for years; Patient quit cold turkey on 1/9/2022; relapse February 2022  Strengths: Strong advocate for self, motivated, engaged, scared after January 2022 hospitalization  Patient expressed understanding of goal: Yes  Action steps to achieve this goal:  1. I will continue to \"keep busy\" and not think or talk about smoking  2. I will continue quitting cold turkey  3. I will apply a Nicotine patch if I can't control the urge  4. I will  place a Nicorette gum to the inside of my cheek every hour until the urge passes  5. I will contact my care team with questions, concerns, support needs                       Intervention/Education provided during outreach:   RNCC called and spoke with patient; introduced self, discussed role of Care Coordination and explained reason for call    Plan:   -Patient will contact the care team with questions, concerns, support needs   -Patient will use the clinic as a resource and understands (s)he can contact the [facility] with 24/7 after hours services available  -Care Coordinator will remain available as needed  -RNCC will follow up in one month for follow up appointments/recommendations and goal progression     Radha Arias RN, BSN, PHN  Primary Care / Care Coordinator   [facility]" [facility]  E-mail [email or web address]   [phone]

## 2022-12-30 ENCOUNTER — OFFICE VISIT (OUTPATIENT)
Dept: DERMATOLOGY | Facility: CLINIC | Age: 62
End: 2022-12-30
Payer: COMMERCIAL

## 2022-12-30 DIAGNOSIS — Z41.1 ELECTIVE PROCEDURE FOR UNACCEPTABLE COSMETIC APPEARANCE: Primary | ICD-10-CM

## 2022-12-30 PROCEDURE — 96999 UNLISTED SPEC DERM SVC/PX: CPT | Performed by: PHYSICIAN ASSISTANT

## 2022-12-30 ASSESSMENT — PAIN SCALES - GENERAL: PAINLEVEL: NO PAIN (0)

## 2022-12-30 NOTE — PATIENT INSTRUCTIONS
Skin care instructions:     Once per day    Wash with a mild cleanser - Dove soap, Cetaphil, CeraVe, etc.  Start using The Buffet from [employer] (you can get this at [employer] or [employer]) - you can get the one with copper and this increases wound healing  Apply a retinol over - [place], [employer], Neutrogena, CeraVe - start with a lower strength and if you tolerate that one ok then you can increase to a higher strength.   Moisturizer over

## 2022-12-30 NOTE — PROGRESS NOTES
HPI:   Chief complaints: Linda Tomlin is a pleasant 62 year old female who presents for evaluation of bothersome crow's feet which are bothersome. She does not do regular skin care. She is interested in cosmetic Botox.       PHYSICAL EXAM:    There were no vitals taken for this visit.  Skin exam performed as follows: Type 2 skin. Mood appropriate  Alert and Oriented X 3. Well developed, well nourished in no distress.  General appearance: Normal  Head including face: Normal  Eyes: conjunctiva and lids: Normal  Mouth: Lips, teeth, gums: Normal  Neck: Normal  Cardiovascular: Exam of peripheral vascular system by observation for swelling, varicosities, edema: Normal  Right upper extremity: Normal  Left upper extremity: Normal  Right lower extremity: Normal  Left lower extremity: Normal  Skin: Scalp and body hair: See below    Age appropriate facial rhytids    ASSESSMENT/PLAN:     1. Botox for facial rhytids. Treatment, duration of 3 months, cosmetic cost of $12 per unit discussed at length with patient.    --24 units injected to crows feet  --Cosmetic charge of $288            Follow-up: 3 months  CC:   Scribed By: Jennifer Cai, MS, PA-C

## 2022-12-30 NOTE — LETTER
12/30/2022         RE: Linda Tomlin  [address]  [place] [address]        Dear Colleague,    Thank you for referring your patient, Linda Tomlin, to the [facility]. Please see a copy of my visit note below.    HPI:   Chief complaints: Linda Tomlin is a pleasant 62 year old female who presents for evaluation of bothersome crow's feet which are bothersome. She does not do regular skin care. She is interested in cosmetic Botox.       PHYSICAL EXAM:    There were no vitals taken for this visit.  Skin exam performed as follows: Type 2 skin. Mood appropriate  Alert and Oriented X 3. Well developed, well nourished in no distress.  General appearance: Normal  Head including face: Normal  Eyes: conjunctiva and lids: Normal  Mouth: Lips, teeth, gums: Normal  Neck: Normal  Cardiovascular: Exam of peripheral vascular system by observation for swelling, varicosities, edema: Normal  Right upper extremity: Normal  Left upper extremity: Normal  Right lower extremity: Normal  Left lower extremity: Normal  Skin: Scalp and body hair: See below    Age appropriate facial rhytids    ASSESSMENT/PLAN:     1. Botox for facial rhytids. Treatment, duration of 3 months, cosmetic cost of $12 per unit discussed at length with patient.    --24 units injected to crows feet  --Cosmetic charge of $288            Follow-up: 3 months  CC:   Scribed By: Jennifer Cai, MS, PAHUY          Again, thank you for allowing me to participate in the care of your patient.        Sincerely,        Jennifer Cai PA-C

## 2023-01-21 DIAGNOSIS — I10 PRIMARY HYPERTENSION: ICD-10-CM

## 2023-01-21 DIAGNOSIS — F32.9 MAJOR DEPRESSIVE DISORDER, REMISSION STATUS UNSPECIFIED, UNSPECIFIED WHETHER RECURRENT: ICD-10-CM

## 2023-01-21 DIAGNOSIS — K21.9 GASTROESOPHAGEAL REFLUX DISEASE WITHOUT ESOPHAGITIS: ICD-10-CM

## 2023-01-23 RX ORDER — BUPROPION HYDROCHLORIDE 150 MG/1
TABLET ORAL
Qty: 90 TABLET | Refills: 0 | Status: SHIPPED | OUTPATIENT
Start: 2023-01-23 | End: 2023-04-25

## 2023-01-23 RX ORDER — METOPROLOL SUCCINATE 50 MG/1
TABLET, EXTENDED RELEASE ORAL
Qty: 90 TABLET | Refills: 0 | Status: SHIPPED | OUTPATIENT
Start: 2023-01-23 | End: 2023-04-25

## 2023-01-23 RX ORDER — ESCITALOPRAM OXALATE 20 MG/1
TABLET ORAL
Qty: 20 TABLET | Refills: 0 | Status: SHIPPED | OUTPATIENT
Start: 2023-01-23 | End: 2023-02-21

## 2023-01-26 ENCOUNTER — PATIENT OUTREACH (OUTPATIENT)
Dept: NURSING | Facility: CLINIC | Age: 63
End: 2023-01-26
Payer: COMMERCIAL

## 2023-01-26 ENCOUNTER — HOSPITAL ENCOUNTER (OUTPATIENT)
Facility: CLINIC | Age: 63
End: 2023-01-26
Attending: PODIATRIST | Admitting: PODIATRIST
Payer: COMMERCIAL

## 2023-01-26 ASSESSMENT — ACTIVITIES OF DAILY LIVING (ADL): DEPENDENT_IADLS:: INDEPENDENT
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
## 2023-02-20 DIAGNOSIS — F32.9 MAJOR DEPRESSIVE DISORDER, REMISSION STATUS UNSPECIFIED, UNSPECIFIED WHETHER RECURRENT: ICD-10-CM

## 2023-02-21 ENCOUNTER — TELEPHONE (OUTPATIENT)
Dept: INTERNAL MEDICINE | Facility: CLINIC | Age: 63
End: 2023-02-21
Payer: COMMERCIAL

## 2023-02-21 DIAGNOSIS — Z78.0 ASYMPTOMATIC POSTMENOPAUSAL STATUS: Primary | ICD-10-CM

## 2023-02-21 RX ORDER — ESCITALOPRAM OXALATE 20 MG/1
TABLET ORAL
Qty: 90 TABLET | Refills: 0 | Status: SHIPPED | OUTPATIENT
Start: 2023-02-21 | End: 2023-04-25

## 2023-02-21 NOTE — TELEPHONE ENCOUNTER
Per pharmacy, Original prescription sent to pharmacy 1/23/23 was for 20 tablets with 0 refills. Computer system automatically requests refill dispense for the previous script amount.    Pharmacy is requesting dispense changed to 90 tablets per patient insurance.

## 2023-02-21 NOTE — TELEPHONE ENCOUNTER
Received a call from the patient with a couple of questions for Dr. Doran...    1. Patient states she went to see a Chiropractor and they completed an XR of her back. Patient was found to have a stress fracture at L4. Patient is wondering if she should undergo a DEXA scan? Patient is also wondering about adding Calcium supplements-would this be beneficial?    2. Patient states she has been experiencing a dry hacking cough for the past 6 months. No fever and no difficulty breathing noted. Patient is wondering if this could be caused by her Lisinopril? Is there a different blood pressure medication she can take?    Will route to PCP for review.     Can we leave a detailed message on this number? YES  Phone number patient can be reached at: Cell number on file:    Telephone Information:   Mobile [phone]       Jesusita Herrmann RN  [facility] Triage

## 2023-02-22 NOTE — TELEPHONE ENCOUNTER
Spoke with patient to relay PCP recommendations. Patient stated she would like to have a bone scan done and would like PCP to order one. Patient also stated she will stop taking Lisinopril to see if her cough goes away. RN advised patient to call clinic back if BP readings stay above 150/80. Patient verbalized understanding and agreed with plan of care.     Routing to PCP.

## 2023-02-22 NOTE — TELEPHONE ENCOUNTER
I can certainly order a bone density scan for patient if interested.  As far as calcium goes usually its about 1200 mg a day.  Depending on the results of her bone density scan further medication options may be available.  I do not have a copy of the x-ray done by her chiropractic to confirm a fracture.    As far as the cough goes, difficult to assess without seeing patient.  Lisinopril can be a cause and source for cough.  Could try stopping blood pressure med for 2 weeks to see if cough improves and then monitor blood pressure and follow-up in clinic to discuss further treatment options and substitutions pending response to cough.

## 2023-02-22 NOTE — TELEPHONE ENCOUNTER
Called and spoke to the patient. Informed patient bone density scan has been ordered. Transferred patient to the [facility] scheduling line to get an appointment scheduled.     Jesusita Herrmann RN

## 2023-03-02 ENCOUNTER — OFFICE VISIT (OUTPATIENT)
Dept: INTERNAL MEDICINE | Facility: CLINIC | Age: 63
End: 2023-03-02
Payer: COMMERCIAL

## 2023-03-02 VITALS
DIASTOLIC BLOOD PRESSURE: 89 MMHG | WEIGHT: 147.8 LBS | SYSTOLIC BLOOD PRESSURE: 144 MMHG | HEIGHT: 63 IN | RESPIRATION RATE: 18 BRPM | TEMPERATURE: 98.1 F | HEART RATE: 79 BPM | BODY MASS INDEX: 26.19 KG/M2 | OXYGEN SATURATION: 96 %

## 2023-03-02 DIAGNOSIS — I10 ESSENTIAL HYPERTENSION: ICD-10-CM

## 2023-03-02 DIAGNOSIS — Z23 HIGH PRIORITY FOR 2019-NCOV VACCINE: ICD-10-CM

## 2023-03-02 DIAGNOSIS — Z01.818 ENCOUNTER FOR PREOPERATIVE ASSESSMENT: Primary | ICD-10-CM

## 2023-03-02 DIAGNOSIS — M72.2 PLANTAR FASCIAL FIBROMATOSIS: ICD-10-CM

## 2023-03-02 LAB
ANION GAP SERPL CALCULATED.3IONS-SCNC: 12 MMOL/L (ref 7–15)
BUN SERPL-MCNC: 6.7 MG/DL (ref 8–23)
CALCIUM SERPL-MCNC: 9.8 MG/DL (ref 8.8–10.2)
CHLORIDE SERPL-SCNC: 105 MMOL/L (ref 98–107)
CREAT SERPL-MCNC: 0.78 MG/DL (ref 0.51–0.95)
DEPRECATED HCO3 PLAS-SCNC: 27 MMOL/L (ref 22–29)
ERYTHROCYTE [DISTWIDTH] IN BLOOD BY AUTOMATED COUNT: 14.4 % (ref 10–15)
GFR SERPL CREATININE-BSD FRML MDRD: 85 ML/MIN/1.73M2
GLUCOSE SERPL-MCNC: 107 MG/DL (ref 70–99)
HCT VFR BLD AUTO: 48.5 % (ref 35–47)
HGB BLD-MCNC: 15.9 G/DL (ref 11.7–15.7)
MCH RBC QN AUTO: 32.3 PG (ref 26.5–33)
MCHC RBC AUTO-ENTMCNC: 32.8 G/DL (ref 31.5–36.5)
MCV RBC AUTO: 99 FL (ref 78–100)
PLATELET # BLD AUTO: 264 10E3/UL (ref 150–450)
POTASSIUM SERPL-SCNC: 4.3 MMOL/L (ref 3.4–5.3)
RBC # BLD AUTO: 4.92 10E6/UL (ref 3.8–5.2)
SODIUM SERPL-SCNC: 144 MMOL/L (ref 136–145)
WBC # BLD AUTO: 5.5 10E3/UL (ref 4–11)

## 2023-03-02 PROCEDURE — 85027 COMPLETE CBC AUTOMATED: CPT | Performed by: INTERNAL MEDICINE

## 2023-03-02 PROCEDURE — 91312 COVID-19 VACCINE BIVALENT BOOSTER 12+ (PFIZER): CPT | Performed by: INTERNAL MEDICINE

## 2023-03-02 PROCEDURE — 99214 OFFICE O/P EST MOD 30 MIN: CPT | Mod: 25 | Performed by: INTERNAL MEDICINE

## 2023-03-02 PROCEDURE — 93000 ELECTROCARDIOGRAM COMPLETE: CPT | Performed by: INTERNAL MEDICINE

## 2023-03-02 PROCEDURE — 36415 COLL VENOUS BLD VENIPUNCTURE: CPT | Performed by: INTERNAL MEDICINE

## 2023-03-02 PROCEDURE — 0124A COVID-19 VACCINE BIVALENT BOOSTER 12+ (PFIZER): CPT | Performed by: INTERNAL MEDICINE

## 2023-03-02 PROCEDURE — 80048 BASIC METABOLIC PNL TOTAL CA: CPT | Performed by: INTERNAL MEDICINE

## 2023-03-02 RX ORDER — LOSARTAN POTASSIUM 50 MG/1
50 TABLET ORAL DAILY
Qty: 90 TABLET | Refills: 0 | Status: SHIPPED | OUTPATIENT
Start: 2023-03-02 | End: 2023-04-25

## 2023-03-02 ASSESSMENT — PATIENT HEALTH QUESTIONNAIRE - PHQ9
10. IF YOU CHECKED OFF ANY PROBLEMS, HOW DIFFICULT HAVE THESE PROBLEMS MADE IT FOR YOU TO DO YOUR WORK, TAKE CARE OF THINGS AT HOME, OR GET ALONG WITH OTHER PEOPLE: SOMEWHAT DIFFICULT
SUM OF ALL RESPONSES TO PHQ QUESTIONS 1-9: 7

## 2023-03-02 NOTE — PATIENT INSTRUCTIONS
- I will send you a message on [employer] when I am able to look at the results of your tests from today  - Do not take any fish oil supplements, vitamin E supplements, or NSAIDs like ibuprofen, aspirin, or Aleve for 5 days prior to surgery. Tylenol is okay to take.    - STOP lisinopril  - START losartan

## 2023-03-02 NOTE — H&P (VIEW-ONLY)
[address] [address]  Phone: [phone]  Primary Provider: Eliceo Doran  Pre-op Performing Provider: FELISHA GOODE    PREOPERATIVE EVALUATION:  Today's date: 3/2/2023    Linda Tomlin is a 63 year old female who presents for a preoperative evaluation.    Surgical Information:  Surgery/Procedure: Removal of Plantar Fibromas, right foot  Surgery Location: [facility]  Surgeon: Dr. Dudley  Surgery Date: 03/10/2023  Time of Surgery: 07:30am  Where patient plans to recover: At home with family  Fax number for surgical facility: Note does not need to be faxed, will be available electronically in Epic.    Type of Anesthesia Anticipated: General    Assessment & Plan   The proposed surgical procedure is considered INTERMEDIATE risk.    Encounter for preoperative assessment  Plantar fascial fibromatosis  Approval given to proceed with proposed procedure without further diagnostic evaluation. Updating labs today. EKG reviewed today. Okay to proceed. Discussed recommendation to discontinue NSAIDs 5 days prior to procedure to reduce bleeding risk. Medications were reviewed in detail today. On the morning of surgery, take all normal medications except aspirin with a small sip of water. Resume all medications post-operatively at the same doses unless otherwise directed by surgical care team.  - EKG 12-lead complete w/read - Clinics  - Basic metabolic panel; Future  - CBC with platelets; Future    Essential hypertension  She reports lisinopril has been giving her a cough. She went off of it and the cough went away but her BP went up. So she went back on lisinopril and cough came back. Will switch her to equivalent dose of losartan.  - losartan (COZAAR) 50 MG tablet; Take 1 tablet (50 mg) by mouth daily    High priority for 2019-nCoV vaccine  - COVID-19,PF,PFIZER BOOSTER BIVALENT 12+Yrs    RECOMMENDATION:  APPROVAL GIVEN to  proceed with proposed procedure, without further diagnostic evaluation.    Subjective   HPI related to upcoming procedure: Linda presents for a pre-op exam. This is the first time I have met Linda. She tells me she's been getting a cough from lisinopril. She has been working with her chiropractor for some back pain after a recent fall.    Preop Questions 3/2/2023   1. Have you ever had a heart attack or stroke? YES - CVA   2. Have you ever had surgery on your heart or blood vessels, such as a stent placement, a coronary artery bypass, or surgery on an artery in your head, neck, heart, or legs? No   3. Do you have chest pain with activity? No   4. Do you have a history of  heart failure? No   5. Do you currently have a cold, bronchitis or symptoms of other infection? No   6. Do you have a cough, shortness of breath, or wheezing? No   7. Do you or anyone in your family have previous history of blood clots? No   8. Do you or does anyone in your family have a serious bleeding problem such as prolonged bleeding following surgeries or cuts? YES   9. Have you ever had problems with anemia or been told to take iron pills? No   10. Have you had any abnormal blood loss such as black, tarry or bloody stools, or abnormal vaginal bleeding? YES   11. Have you ever had a blood transfusion? YES   11a. Have you ever had a transfusion reaction? No   12. Are you willing to have a blood transfusion if it is medically needed before, during, or after your surgery? Yes   13. Have you or any of your relatives ever had problems with anesthesia? No   14. Do you have sleep apnea, excessive snoring or daytime drowsiness? No   15. Do you have any artifical heart valves or other implanted medical devices like a pacemaker, defibrillator, or continuous glucose monitor? No   16. Do you have artificial joints? No   17. Are you allergic to latex? No     Health Care Directive:  Patient does not have a Health Care Directive or Living Will    Preoperative  Review of :   reviewed - controlled substances prescribed by other outside provider(s).    Review of Systems  8pt ROS reviewed and all other systems negative unless otherwise stated above.    Patient Active Problem List    Diagnosis Date Noted     TIA (transient ischemic attack) 01/10/2022     Priority: Medium     Acute ischemic stroke (H) 01/10/2022     Priority: Medium     Aphasia 01/09/2022     Priority: Medium     plantar fibroma, right  04/23/2014     Priority: Medium     Low back pain 03/24/2014     Priority: Medium     Ascorbic acid deficiency 03/24/2014     Priority: Medium     Diagnosis updated by automated process. Provider to review and confirm.       Vitamin D deficiency 01/31/2014     Priority: Medium     Polycythemia 01/31/2014     Priority: Medium     Closed fracture of one or more phalanges of foot 03/27/2013     Priority: Medium     Hyperlipidemia LDL goal <100 02/18/2013     Priority: Medium     Encounter for long-term (current) use of medications 02/18/2013     Priority: Medium     STATINS AND RISK OF LFT INCREASE       Vitamin B12 deficiency without anemia 02/18/2013     Priority: Medium     Diagnosis updated by automated process. Provider to review and confirm.       Moderate major depression (H) 08/03/2012     Priority: Medium     HTN (hypertension) 08/03/2012     Priority: Medium      Past Medical History:   Diagnosis Date     Breast cancer (H) 2009    lumpectomy and radiation x 35     Diverticular disease of colon      Ovarian cyst      TIA (transient ischemic attack) 1/10/2022     Past Surgical History:   Procedure Laterality Date     COLON SURGERY  [id number]    HEMICOLECTOMY     COLONOSCOPY N/A 1/3/2019    Procedure: COMBINED COLONOSCOPY, SINGLE OR MULTIPLE BIOPSY/POLYPECTOMY BY BIOPSY;  Surgeon: Mane Jay MD;  Location:  GI     HAND SURGERY  1996    Carpal Tunnel Finger/hand Surgery     HYSTERECTOMY, PAP NO LONGER INDICATED  1994     LUMPECTOMY BREAST  2008     SALPINGO  "OOPHORECTOMY,R/L/SIMON  01/172011    Salpingo Oophorectomy, RT/LT/SIMON     TONSILLECTOMY  1991     Current Outpatient Medications   Medication Sig Dispense Refill     aspirin (ASA) 81 MG EC tablet Take 1 tablet (81 mg) by mouth daily 90 tablet 1     atorvastatin (LIPITOR) 40 MG tablet Take 1 tablet (40 mg) by mouth daily 30 tablet 0     buPROPion (WELLBUTRIN XL) 150 MG 24 hr tablet TAKE ONE TABLET BY MOUTH EVERY DAY 90 tablet 0     escitalopram (LEXAPRO) 20 MG tablet TAKE 1 TABLET BY MOUTH EVERY DAY 90 tablet 0     lisinopril (ZESTRIL) 20 MG tablet Take 1 tablet (20 mg) by mouth daily 90 tablet 2     metoprolol succinate ER (TOPROL XL) 50 MG 24 hr tablet TAKE ONE TABLET BY MOUTH EVERY DAY 90 tablet 0     omeprazole (PRILOSEC) 20 MG DR capsule TAKE 1 CAPSULE BY MOUTH EVERY MORNING BEFORE BREAKFAST 90 capsule 0     pimecrolimus (ELIDEL) 1 % external cream Apply topically 2 times daily 60 g 6     Allergies   Allergen Reactions     Percocet [Oxycodone-Acetaminophen] Rash     Chantix [Varenicline Tartrate] Nausea and Vomiting     SEVERE VOMITING      Social History     Tobacco Use     Smoking status: Every Day     Packs/day: 1.00     Years: 20.00     Pack years: 20.00     Types: Cigarettes     Smokeless tobacco: Never     Tobacco comments:     2-3 a day - working on quitting   Substance Use Topics     Alcohol use: Yes     Alcohol/week: 0.8 - 1.7 standard drinks     Types: 1 - 2 Standard drinks or equivalent per week     History   Drug Use No         Objective   BP (!) 144/89   Pulse 79   Temp 98.1  F (36.7  C) (Temporal)   Resp 18   Ht 1.6 m (5' 3\")   Wt 67 kg (147 lb 12.8 oz)   SpO2 96%   BMI 26.18 kg/m      Physical Exam  GENERAL: alert and in no distress.  EYES: conjunctivae/corneas clear. EOMs grossly intact  HENT: Facies symmetric.  RESP: CTAB, no w/r/r.  CV: RRR, no m/r/g.  GI: NT, ND, without rebound tenderness or guarding  MSK: No edema. Moves all four extremities freely.  SKIN: No significant ulcers, " lesions, or rashes on the visualized portions of the skin  NEURO: CN II-XII grossly intact.    Recent Labs   Lab Test 02/03/22  0831 01/09/22  1918   HGB 16.5* 18.6*    234   INR  --  1.01    140   POTASSIUM 4.6 3.6   CR 0.74 0.72   A1C  --  5.4      Diagnostics:  Labs pending at this time.  Results will be reviewed when available.   EKG: Normal Sinus Rhythm, possible old infarct, unchanged from previous tracings    Revised Cardiac Risk Index (RCRI):  The patient has the following serious cardiovascular risks for perioperative complications:   - Cerebrovascular Disease (TIA or CVA) = 1 point     RCRI Interpretation: 1 point: Class II (low risk - 0.9% complication rate)    Signed Electronically by: Rio Jackson MD  Copy of this evaluation report is provided to requesting physician.

## 2023-03-02 NOTE — PROGRESS NOTES
[address] [address]  Phone: [phone]  Primary Provider: Eliceo Doran  Pre-op Performing Provider: FELISHA GOODE    PREOPERATIVE EVALUATION:  Today's date: 3/2/2023    Linda Tomlin is a 63 year old female who presents for a preoperative evaluation.    Surgical Information:  Surgery/Procedure: Removal of Plantar Fibromas, right foot  Surgery Location: [facility]  Surgeon: Dr. Dduley  Surgery Date: 03/10/2023  Time of Surgery: 07:30am  Where patient plans to recover: At home with family  Fax number for surgical facility: Note does not need to be faxed, will be available electronically in Epic.    Type of Anesthesia Anticipated: General    Assessment & Plan   The proposed surgical procedure is considered INTERMEDIATE risk.    Encounter for preoperative assessment  Plantar fascial fibromatosis  Approval given to proceed with proposed procedure without further diagnostic evaluation. Updating labs today. EKG reviewed today. Okay to proceed. Discussed recommendation to discontinue NSAIDs 5 days prior to procedure to reduce bleeding risk. Medications were reviewed in detail today. On the morning of surgery, take all normal medications except aspirin with a small sip of water. Resume all medications post-operatively at the same doses unless otherwise directed by surgical care team.  - EKG 12-lead complete w/read - Clinics  - Basic metabolic panel; Future  - CBC with platelets; Future    Essential hypertension  She reports lisinopril has been giving her a cough. She went off of it and the cough went away but her BP went up. So she went back on lisinopril and cough came back. Will switch her to equivalent dose of losartan.  - losartan (COZAAR) 50 MG tablet; Take 1 tablet (50 mg) by mouth daily    High priority for 2019-nCoV vaccine  - COVID-19,PF,PFIZER BOOSTER BIVALENT 12+Yrs    RECOMMENDATION:  APPROVAL GIVEN to  proceed with proposed procedure, without further diagnostic evaluation.    Subjective   HPI related to upcoming procedure: Linda presents for a pre-op exam. This is the first time I have met Linda. She tells me she's been getting a cough from lisinopril. She has been working with her chiropractor for some back pain after a recent fall.    Preop Questions 3/2/2023   1. Have you ever had a heart attack or stroke? YES - CVA   2. Have you ever had surgery on your heart or blood vessels, such as a stent placement, a coronary artery bypass, or surgery on an artery in your head, neck, heart, or legs? No   3. Do you have chest pain with activity? No   4. Do you have a history of  heart failure? No   5. Do you currently have a cold, bronchitis or symptoms of other infection? No   6. Do you have a cough, shortness of breath, or wheezing? No   7. Do you or anyone in your family have previous history of blood clots? No   8. Do you or does anyone in your family have a serious bleeding problem such as prolonged bleeding following surgeries or cuts? YES   9. Have you ever had problems with anemia or been told to take iron pills? No   10. Have you had any abnormal blood loss such as black, tarry or bloody stools, or abnormal vaginal bleeding? YES   11. Have you ever had a blood transfusion? YES   11a. Have you ever had a transfusion reaction? No   12. Are you willing to have a blood transfusion if it is medically needed before, during, or after your surgery? Yes   13. Have you or any of your relatives ever had problems with anesthesia? No   14. Do you have sleep apnea, excessive snoring or daytime drowsiness? No   15. Do you have any artifical heart valves or other implanted medical devices like a pacemaker, defibrillator, or continuous glucose monitor? No   16. Do you have artificial joints? No   17. Are you allergic to latex? No     Health Care Directive:  Patient does not have a Health Care Directive or Living Will    Preoperative  Review of :   reviewed - controlled substances prescribed by other outside provider(s).    Review of Systems  8pt ROS reviewed and all other systems negative unless otherwise stated above.    Patient Active Problem List    Diagnosis Date Noted     TIA (transient ischemic attack) 01/10/2022     Priority: Medium     Acute ischemic stroke (H) 01/10/2022     Priority: Medium     Aphasia 01/09/2022     Priority: Medium     plantar fibroma, right  04/23/2014     Priority: Medium     Low back pain 03/24/2014     Priority: Medium     Ascorbic acid deficiency 03/24/2014     Priority: Medium     Diagnosis updated by automated process. Provider to review and confirm.       Vitamin D deficiency 01/31/2014     Priority: Medium     Polycythemia 01/31/2014     Priority: Medium     Closed fracture of one or more phalanges of foot 03/27/2013     Priority: Medium     Hyperlipidemia LDL goal <100 02/18/2013     Priority: Medium     Encounter for long-term (current) use of medications 02/18/2013     Priority: Medium     STATINS AND RISK OF LFT INCREASE       Vitamin B12 deficiency without anemia 02/18/2013     Priority: Medium     Diagnosis updated by automated process. Provider to review and confirm.       Moderate major depression (H) 08/03/2012     Priority: Medium     HTN (hypertension) 08/03/2012     Priority: Medium      Past Medical History:   Diagnosis Date     Breast cancer (H) 2009    lumpectomy and radiation x 35     Diverticular disease of colon      Ovarian cyst      TIA (transient ischemic attack) 1/10/2022     Past Surgical History:   Procedure Laterality Date     COLON SURGERY  [id number]    HEMICOLECTOMY     COLONOSCOPY N/A 1/3/2019    Procedure: COMBINED COLONOSCOPY, SINGLE OR MULTIPLE BIOPSY/POLYPECTOMY BY BIOPSY;  Surgeon: Mane Jay MD;  Location:  GI     HAND SURGERY  1996    Carpal Tunnel Finger/hand Surgery     HYSTERECTOMY, PAP NO LONGER INDICATED  1994     LUMPECTOMY BREAST  2008     SALPINGO  "OOPHORECTOMY,R/L/SIMON  01/172011    Salpingo Oophorectomy, RT/LT/SIMON     TONSILLECTOMY  1991     Current Outpatient Medications   Medication Sig Dispense Refill     aspirin (ASA) 81 MG EC tablet Take 1 tablet (81 mg) by mouth daily 90 tablet 1     atorvastatin (LIPITOR) 40 MG tablet Take 1 tablet (40 mg) by mouth daily 30 tablet 0     buPROPion (WELLBUTRIN XL) 150 MG 24 hr tablet TAKE ONE TABLET BY MOUTH EVERY DAY 90 tablet 0     escitalopram (LEXAPRO) 20 MG tablet TAKE 1 TABLET BY MOUTH EVERY DAY 90 tablet 0     lisinopril (ZESTRIL) 20 MG tablet Take 1 tablet (20 mg) by mouth daily 90 tablet 2     metoprolol succinate ER (TOPROL XL) 50 MG 24 hr tablet TAKE ONE TABLET BY MOUTH EVERY DAY 90 tablet 0     omeprazole (PRILOSEC) 20 MG DR capsule TAKE 1 CAPSULE BY MOUTH EVERY MORNING BEFORE BREAKFAST 90 capsule 0     pimecrolimus (ELIDEL) 1 % external cream Apply topically 2 times daily 60 g 6     Allergies   Allergen Reactions     Percocet [Oxycodone-Acetaminophen] Rash     Chantix [Varenicline Tartrate] Nausea and Vomiting     SEVERE VOMITING      Social History     Tobacco Use     Smoking status: Every Day     Packs/day: 1.00     Years: 20.00     Pack years: 20.00     Types: Cigarettes     Smokeless tobacco: Never     Tobacco comments:     2-3 a day - working on quitting   Substance Use Topics     Alcohol use: Yes     Alcohol/week: 0.8 - 1.7 standard drinks     Types: 1 - 2 Standard drinks or equivalent per week     History   Drug Use No         Objective   BP (!) 144/89   Pulse 79   Temp 98.1  F (36.7  C) (Temporal)   Resp 18   Ht 1.6 m (5' 3\")   Wt 67 kg (147 lb 12.8 oz)   SpO2 96%   BMI 26.18 kg/m      Physical Exam  GENERAL: alert and in no distress.  EYES: conjunctivae/corneas clear. EOMs grossly intact  HENT: Facies symmetric.  RESP: CTAB, no w/r/r.  CV: RRR, no m/r/g.  GI: NT, ND, without rebound tenderness or guarding  MSK: No edema. Moves all four extremities freely.  SKIN: No significant ulcers, " lesions, or rashes on the visualized portions of the skin  NEURO: CN II-XII grossly intact.    Recent Labs   Lab Test 02/03/22  0831 01/09/22  1918   HGB 16.5* 18.6*    234   INR  --  1.01    140   POTASSIUM 4.6 3.6   CR 0.74 0.72   A1C  --  5.4      Diagnostics:  Labs pending at this time.  Results will be reviewed when available.   EKG: Normal Sinus Rhythm, possible old infarct, unchanged from previous tracings    Revised Cardiac Risk Index (RCRI):  The patient has the following serious cardiovascular risks for perioperative complications:   - Cerebrovascular Disease (TIA or CVA) = 1 point     RCRI Interpretation: 1 point: Class II (low risk - 0.9% complication rate)    Signed Electronically by: Rio Jackson MD  Copy of this evaluation report is provided to requesting physician.

## 2023-03-06 ENCOUNTER — ANCILLARY PROCEDURE (OUTPATIENT)
Dept: BONE DENSITY | Facility: CLINIC | Age: 63
End: 2023-03-06
Attending: INTERNAL MEDICINE
Payer: COMMERCIAL

## 2023-03-06 DIAGNOSIS — Z78.0 ASYMPTOMATIC POSTMENOPAUSAL STATUS: ICD-10-CM

## 2023-03-06 PROCEDURE — 77080 DXA BONE DENSITY AXIAL: CPT | Performed by: INTERNAL MEDICINE

## 2023-03-09 ENCOUNTER — ANESTHESIA EVENT (OUTPATIENT)
Dept: SURGERY | Facility: CLINIC | Age: 63
End: 2023-03-09
Payer: COMMERCIAL

## 2023-03-09 ASSESSMENT — LIFESTYLE VARIABLES: TOBACCO_USE: 1

## 2023-03-09 NOTE — ANESTHESIA PREPROCEDURE EVALUATION
Anesthesia Pre-Procedure Evaluation    Patient: Linda Tomlin   MRN: [id number] : 1960        Procedure : Procedure(s):  REMOVAL OF PLANTAR FIBROMAS, RIGHT FOOT          Past Medical History:   Diagnosis Date     Breast cancer (H) 2009    lumpectomy and radiation x 35     Diverticular disease of colon      Ovarian cyst      TIA (transient ischemic attack) 1/10/2022      Past Surgical History:   Procedure Laterality Date     COLON SURGERY  [id number]    HEMICOLECTOMY     COLONOSCOPY N/A 1/3/2019    Procedure: COMBINED COLONOSCOPY, SINGLE OR MULTIPLE BIOPSY/POLYPECTOMY BY BIOPSY;  Surgeon: Mane Jay MD;  Location:  GI     HAND SURGERY      Carpal Tunnel Finger/hand Surgery     HYSTERECTOMY, PAP NO LONGER INDICATED       LUMPECTOMY BREAST  2008     SALPINGO OOPHORECTOMY,R/L/SIMON      Salpingo Oophorectomy, RT/LT/SIMON     TONSILLECTOMY        Allergies   Allergen Reactions     Percocet [Oxycodone-Acetaminophen] Rash     Chantix [Varenicline Tartrate] Nausea and Vomiting     SEVERE VOMITING      Social History     Tobacco Use     Smoking status: Every Day     Packs/day: 1.00     Years: 20.00     Pack years: 20.00     Types: Cigarettes     Smokeless tobacco: Never     Tobacco comments:     2-3 a day - working on quitting   Substance Use Topics     Alcohol use: Yes     Alcohol/week: 0.8 - 1.7 standard drinks     Types: 1 - 2 Standard drinks or equivalent per week      Wt Readings from Last 1 Encounters:   23 67 kg (147 lb 12.8 oz)        Anesthesia Evaluation   Pt has had prior anesthetic.     No history of anesthetic complications       ROS/MED HX  ENT/Pulmonary:     (+) tobacco use, Past use,     Neurologic: Comment: H/o aphasia  H/o acute ischemic stroke     (+) CVA,     Cardiovascular:     (+) Dyslipidemia hypertension-----    METS/Exercise Tolerance:     Hematologic: Comments: Polycythemia      Musculoskeletal: Comment: Plantar fascial fibromatosis      GI/Hepatic: Comment:  Diverticular disease of colon    (+) GERD,     Renal/Genitourinary:  - neg Renal ROS     Endo:  - neg endo ROS     Psychiatric/Substance Use:     (+) psychiatric history depression     Infectious Disease:  - neg infectious disease ROS     Malignancy:   (+) Malignancy, History of Breast.    Other: Comment: Vitamin B12 deficiency without anemia  Vitamin D deficiency           Physical Exam    Airway        Mallampati: II   TM distance: > 3 FB   Neck ROM: full   Mouth opening: > 3 cm    Respiratory Devices and Support         Dental       (+) Minor Abnormalities - some fillings, tiny chips      Cardiovascular          Rhythm and rate: regular and normal     Pulmonary           breath sounds clear to auscultation           OUTSIDE LABS:  CBC:   Lab Results   Component Value Date    WBC 5.5 03/02/2023    WBC 5.2 02/03/2022    HGB 15.9 (H) 03/02/2023    HGB 16.5 (H) 02/03/2022    HCT 48.5 (H) 03/02/2023    HCT 50.2 (H) 02/03/2022     03/02/2023     02/03/2022     BMP:   Lab Results   Component Value Date     03/02/2023     02/03/2022    POTASSIUM 4.3 03/02/2023    POTASSIUM 4.6 02/03/2022    CHLORIDE 105 03/02/2023    CHLORIDE 108 02/03/2022    CO2 27 03/02/2023    CO2 31 02/03/2022    BUN 6.7 (L) 03/02/2023    BUN 9 02/03/2022    CR 0.78 03/02/2023    CR 0.74 02/03/2022     (H) 03/02/2023    GLC 98 02/03/2022     COAGS:   Lab Results   Component Value Date    PTT 30 01/09/2022    INR 1.01 01/09/2022     POC: No results found for: BGM, HCG, HCGS  HEPATIC:   Lab Results   Component Value Date    ALBUMIN 3.7 02/03/2022    PROTTOTAL 6.7 (L) 02/03/2022    ALT 36 02/03/2022    AST 26 02/03/2022    ALKPHOS 73 02/03/2022    BILITOTAL 0.4 02/03/2022     OTHER:   Lab Results   Component Value Date    A1C 5.4 01/09/2022    HUMA 9.8 03/02/2023    TSH 1.34 02/03/2022    T4 0.87 01/07/2013       Anesthesia Plan    ASA Status:  2      Anesthesia Type: General.     - Airway: ETT   Induction: Intravenous,  Propofol.   Maintenance: Balanced.   Techniques and Equipment:     - Airway: Video-Laryngoscope         Consents    Anesthesia Plan(s) and associated risks, benefits, and realistic alternatives discussed. Questions answered and patient/representative(s) expressed understanding.    - Discussed:     - Discussed with:  Patient         Postoperative Care    Pain management: IV analgesics, Multi-modal analgesia.   PONV prophylaxis: Ondansetron (or other 5HT-3), Dexamethasone or Solumedrol     Comments:                Fuad Araujo MD

## 2023-03-10 ENCOUNTER — HOSPITAL ENCOUNTER (OUTPATIENT)
Facility: CLINIC | Age: 63
Discharge: HOME | End: 2023-03-10
Attending: PODIATRIST | Admitting: PODIATRIST
Payer: COMMERCIAL

## 2023-03-10 ENCOUNTER — ANESTHESIA (OUTPATIENT)
Dept: SURGERY | Facility: CLINIC | Age: 63
End: 2023-03-10
Payer: COMMERCIAL

## 2023-03-10 VITALS
HEIGHT: 64 IN | WEIGHT: 147 LBS | OXYGEN SATURATION: 97 % | RESPIRATION RATE: 16 BRPM | DIASTOLIC BLOOD PRESSURE: 84 MMHG | HEART RATE: 76 BPM | TEMPERATURE: 97.7 F | SYSTOLIC BLOOD PRESSURE: 137 MMHG | BODY MASS INDEX: 25.1 KG/M2

## 2023-03-10 DIAGNOSIS — M72.2 PLANTAR FASCIAL FIBROMATOSIS: Primary | ICD-10-CM

## 2023-03-10 LAB — POTASSIUM SERPL-SCNC: 4.2 MMOL/L (ref 3.4–5.3)

## 2023-03-10 PROCEDURE — 88305 TISSUE EXAM BY PATHOLOGIST: CPT | Mod: TC | Performed by: PODIATRIST

## 2023-03-10 PROCEDURE — 88305 TISSUE EXAM BY PATHOLOGIST: CPT | Mod: 26 | Performed by: PATHOLOGY

## 2023-03-10 PROCEDURE — 250N000011 HC RX IP 250 OP 636

## 2023-03-10 PROCEDURE — 250N000009 HC RX 250: Performed by: PODIATRIST

## 2023-03-10 PROCEDURE — 258N000003 HC RX IP 258 OP 636: Performed by: ANESTHESIOLOGY

## 2023-03-10 PROCEDURE — 272N000001 HC OR GENERAL SUPPLY STERILE: Performed by: PODIATRIST

## 2023-03-10 PROCEDURE — 250N000011 HC RX IP 250 OP 636: Performed by: PODIATRIST

## 2023-03-10 PROCEDURE — 710N000012 HC RECOVERY PHASE 2, PER MINUTE: Performed by: PODIATRIST

## 2023-03-10 PROCEDURE — 258N000003 HC RX IP 258 OP 636

## 2023-03-10 PROCEDURE — 250N000025 HC SEVOFLURANE, PER MIN: Performed by: PODIATRIST

## 2023-03-10 PROCEDURE — 360N000082 HC SURGERY LEVEL 2 W/ FLUORO, PER MIN: Performed by: PODIATRIST

## 2023-03-10 PROCEDURE — L4360 PNEUMAT WALKING BOOT PRE CST: HCPCS

## 2023-03-10 PROCEDURE — 370N000017 HC ANESTHESIA TECHNICAL FEE, PER MIN: Performed by: PODIATRIST

## 2023-03-10 PROCEDURE — 250N000009 HC RX 250

## 2023-03-10 PROCEDURE — 999N000141 HC STATISTIC PRE-PROCEDURE NURSING ASSESSMENT: Performed by: PODIATRIST

## 2023-03-10 PROCEDURE — 36415 COLL VENOUS BLD VENIPUNCTURE: CPT | Performed by: ANESTHESIOLOGY

## 2023-03-10 PROCEDURE — 710N000009 HC RECOVERY PHASE 1, LEVEL 1, PER MIN: Performed by: PODIATRIST

## 2023-03-10 PROCEDURE — 84132 ASSAY OF SERUM POTASSIUM: CPT | Performed by: ANESTHESIOLOGY

## 2023-03-10 RX ORDER — FENTANYL CITRATE 0.05 MG/ML
50 INJECTION, SOLUTION INTRAMUSCULAR; INTRAVENOUS
Status: DISCONTINUED | OUTPATIENT
Start: 2023-03-10 | End: 2023-03-10 | Stop reason: HOSPADM

## 2023-03-10 RX ORDER — ONDANSETRON 2 MG/ML
4 INJECTION INTRAMUSCULAR; INTRAVENOUS EVERY 30 MIN PRN
Status: DISCONTINUED | OUTPATIENT
Start: 2023-03-10 | End: 2023-03-10 | Stop reason: HOSPADM

## 2023-03-10 RX ORDER — FENTANYL CITRATE 0.05 MG/ML
50 INJECTION, SOLUTION INTRAMUSCULAR; INTRAVENOUS EVERY 5 MIN PRN
Status: DISCONTINUED | OUTPATIENT
Start: 2023-03-10 | End: 2023-03-10 | Stop reason: HOSPADM

## 2023-03-10 RX ORDER — PROPOFOL 10 MG/ML
INJECTION, EMULSION INTRAVENOUS PRN
Status: DISCONTINUED | OUTPATIENT
Start: 2023-03-10 | End: 2023-03-10

## 2023-03-10 RX ORDER — SODIUM CHLORIDE, SODIUM LACTATE, POTASSIUM CHLORIDE, CALCIUM CHLORIDE 600; 310; 30; 20 MG/100ML; MG/100ML; MG/100ML; MG/100ML
INJECTION, SOLUTION INTRAVENOUS CONTINUOUS
Status: DISCONTINUED | OUTPATIENT
Start: 2023-03-10 | End: 2023-03-10 | Stop reason: HOSPADM

## 2023-03-10 RX ORDER — HYDROMORPHONE HCL IN WATER/PF 6 MG/30 ML
0.2 PATIENT CONTROLLED ANALGESIA SYRINGE INTRAVENOUS EVERY 5 MIN PRN
Status: DISCONTINUED | OUTPATIENT
Start: 2023-03-10 | End: 2023-03-10 | Stop reason: HOSPADM

## 2023-03-10 RX ORDER — CEFAZOLIN SODIUM/WATER 2 G/20 ML
2 SYRINGE (ML) INTRAVENOUS SEE ADMIN INSTRUCTIONS
Status: DISCONTINUED | OUTPATIENT
Start: 2023-03-10 | End: 2023-03-10 | Stop reason: HOSPADM

## 2023-03-10 RX ORDER — HYDROCODONE BITARTRATE AND ACETAMINOPHEN 5; 325 MG/1; MG/1
1 TABLET ORAL EVERY 6 HOURS PRN
Qty: 20 TABLET | Refills: 0 | Status: SHIPPED | OUTPATIENT
Start: 2023-03-10 | End: 2023-03-13

## 2023-03-10 RX ORDER — LIDOCAINE HYDROCHLORIDE 20 MG/ML
INJECTION, SOLUTION INFILTRATION; PERINEURAL PRN
Status: DISCONTINUED | OUTPATIENT
Start: 2023-03-10 | End: 2023-03-10

## 2023-03-10 RX ORDER — SODIUM CHLORIDE, SODIUM LACTATE, POTASSIUM CHLORIDE, CALCIUM CHLORIDE 600; 310; 30; 20 MG/100ML; MG/100ML; MG/100ML; MG/100ML
INJECTION, SOLUTION INTRAVENOUS CONTINUOUS PRN
Status: DISCONTINUED | OUTPATIENT
Start: 2023-03-10 | End: 2023-03-10

## 2023-03-10 RX ORDER — FENTANYL CITRATE 50 UG/ML
INJECTION, SOLUTION INTRAMUSCULAR; INTRAVENOUS PRN
Status: DISCONTINUED | OUTPATIENT
Start: 2023-03-10 | End: 2023-03-10

## 2023-03-10 RX ORDER — FENTANYL CITRATE 0.05 MG/ML
25 INJECTION, SOLUTION INTRAMUSCULAR; INTRAVENOUS EVERY 5 MIN PRN
Status: DISCONTINUED | OUTPATIENT
Start: 2023-03-10 | End: 2023-03-10 | Stop reason: HOSPADM

## 2023-03-10 RX ORDER — NEOSTIGMINE METHYLSULFATE 1 MG/ML
VIAL (ML) INJECTION PRN
Status: DISCONTINUED | OUTPATIENT
Start: 2023-03-10 | End: 2023-03-10

## 2023-03-10 RX ORDER — PROPOFOL 10 MG/ML
INJECTION, EMULSION INTRAVENOUS CONTINUOUS PRN
Status: DISCONTINUED | OUTPATIENT
Start: 2023-03-10 | End: 2023-03-10

## 2023-03-10 RX ORDER — CEFAZOLIN SODIUM/WATER 2 G/20 ML
2 SYRINGE (ML) INTRAVENOUS
Status: COMPLETED | OUTPATIENT
Start: 2023-03-10 | End: 2023-03-10

## 2023-03-10 RX ORDER — ONDANSETRON 2 MG/ML
INJECTION INTRAMUSCULAR; INTRAVENOUS PRN
Status: DISCONTINUED | OUTPATIENT
Start: 2023-03-10 | End: 2023-03-10

## 2023-03-10 RX ORDER — GLYCOPYRROLATE 0.2 MG/ML
INJECTION, SOLUTION INTRAMUSCULAR; INTRAVENOUS PRN
Status: DISCONTINUED | OUTPATIENT
Start: 2023-03-10 | End: 2023-03-10

## 2023-03-10 RX ORDER — DEXAMETHASONE SODIUM PHOSPHATE 4 MG/ML
INJECTION, SOLUTION INTRA-ARTICULAR; INTRALESIONAL; INTRAMUSCULAR; INTRAVENOUS; SOFT TISSUE PRN
Status: DISCONTINUED | OUTPATIENT
Start: 2023-03-10 | End: 2023-03-10

## 2023-03-10 RX ORDER — ONDANSETRON 4 MG/1
4 TABLET, ORALLY DISINTEGRATING ORAL EVERY 30 MIN PRN
Status: DISCONTINUED | OUTPATIENT
Start: 2023-03-10 | End: 2023-03-10 | Stop reason: HOSPADM

## 2023-03-10 RX ORDER — LIDOCAINE 40 MG/G
CREAM TOPICAL
Status: DISCONTINUED | OUTPATIENT
Start: 2023-03-10 | End: 2023-03-10 | Stop reason: HOSPADM

## 2023-03-10 RX ORDER — HYDROMORPHONE HCL IN WATER/PF 6 MG/30 ML
0.4 PATIENT CONTROLLED ANALGESIA SYRINGE INTRAVENOUS EVERY 5 MIN PRN
Status: DISCONTINUED | OUTPATIENT
Start: 2023-03-10 | End: 2023-03-10 | Stop reason: HOSPADM

## 2023-03-10 RX ADMIN — DEXAMETHASONE SODIUM PHOSPHATE 4 MG: 4 INJECTION, SOLUTION INTRA-ARTICULAR; INTRALESIONAL; INTRAMUSCULAR; INTRAVENOUS; SOFT TISSUE at 07:47

## 2023-03-10 RX ADMIN — PROPOFOL 30 MCG/KG/MIN: 10 INJECTION, EMULSION INTRAVENOUS at 07:45

## 2023-03-10 RX ADMIN — LIDOCAINE HYDROCHLORIDE 80 MG: 20 INJECTION, SOLUTION INFILTRATION; PERINEURAL at 07:37

## 2023-03-10 RX ADMIN — PROPOFOL 180 MG: 10 INJECTION, EMULSION INTRAVENOUS at 07:37

## 2023-03-10 RX ADMIN — NEOSTIGMINE METHYLSULFATE 4 MG: 1 INJECTION, SOLUTION INTRAVENOUS at 08:36

## 2023-03-10 RX ADMIN — FENTANYL CITRATE 50 MCG: 50 INJECTION, SOLUTION INTRAMUSCULAR; INTRAVENOUS at 07:37

## 2023-03-10 RX ADMIN — SODIUM CHLORIDE, POTASSIUM CHLORIDE, SODIUM LACTATE AND CALCIUM CHLORIDE: 600; 310; 30; 20 INJECTION, SOLUTION INTRAVENOUS at 07:32

## 2023-03-10 RX ADMIN — FENTANYL CITRATE 25 MCG: 50 INJECTION, SOLUTION INTRAMUSCULAR; INTRAVENOUS at 08:08

## 2023-03-10 RX ADMIN — GLYCOPYRROLATE 0.6 MG: 0.2 INJECTION, SOLUTION INTRAMUSCULAR; INTRAVENOUS at 08:36

## 2023-03-10 RX ADMIN — Medication 2 G: at 07:32

## 2023-03-10 RX ADMIN — ROCURONIUM BROMIDE 20 MG: 50 INJECTION, SOLUTION INTRAVENOUS at 07:38

## 2023-03-10 RX ADMIN — MIDAZOLAM 2 MG: 1 INJECTION INTRAMUSCULAR; INTRAVENOUS at 07:32

## 2023-03-10 RX ADMIN — SODIUM CHLORIDE, POTASSIUM CHLORIDE, SODIUM LACTATE AND CALCIUM CHLORIDE: 600; 310; 30; 20 INJECTION, SOLUTION INTRAVENOUS at 06:42

## 2023-03-10 RX ADMIN — FENTANYL CITRATE 25 MCG: 50 INJECTION, SOLUTION INTRAMUSCULAR; INTRAVENOUS at 08:47

## 2023-03-10 RX ADMIN — ONDANSETRON 4 MG: 2 INJECTION INTRAMUSCULAR; INTRAVENOUS at 07:47

## 2023-03-10 ASSESSMENT — ACTIVITIES OF DAILY LIVING (ADL)
ADLS_ACUITY_SCORE: 35
ADLS_ACUITY_SCORE: 35

## 2023-03-10 NOTE — ANESTHESIA CARE TRANSFER NOTE
Patient: Linda Tomlin    Procedure: Procedure(s):  REMOVAL OF PLANTAR FIBROMAS, RIGHT FOOT       Diagnosis: Plantar fascial fibromatosis [M72.2]  Diagnosis Additional Information: No value filed.    Anesthesia Type:   General     Note:    Oropharynx: oropharynx clear of all foreign objects and spontaneously breathing  Level of Consciousness: awake and unresponsive  Oxygen Supplementation: face mask  Level of Supplemental Oxygen (L/min / FiO2): 6  Independent Airway: airway patency satisfactory and stable  Dentition: dentition unchanged  Vital Signs Stable: post-procedure vital signs reviewed and stable  Report to RN Given: handoff report given  Patient transferred to: PACU    Handoff Report: Identifed the Patient, Identified the Reponsible Provider, Reviewed the pertinent medical history, Discussed the surgical course, Reviewed Intra-OP anesthesia mangement and issues during anesthesia, Set expectations for post-procedure period and Allowed opportunity for questions and acknowledgement of understanding      Vitals:  Vitals Value Taken Time   /94 03/10/23 0851   Temp     Pulse 80 03/10/23 0856   Resp 20 03/10/23 0856   SpO2 95 % 03/10/23 0856   Vitals shown include unvalidated device data.    Electronically Signed By: EARL Coats CRNA  March 10, 2023  8:57 AM

## 2023-03-10 NOTE — OP NOTE
[facility] Orthopedic Brief Operative Note    Pre-operative diagnosis: Plantar fascial fibromatosis [M72.2]   Post-operative diagnosis: Same   Procedure: Procedure(s):  REMOVAL OF PLANTAR FIBROMAS, RIGHT FOOT   Surgeon: VALERIE CLAUDIO DPM   Assistant(s): None   Anesthesia: General with Block   Estimated blood loss: * No blood loss amount entered *   Total IV fluids: (See anesthesia record)   Blood transfusion: No transfusion was given during surgery   Total urine output: (See anesthesia record)   Drains: None   Specimens: Right foot medial plantar fibroma and lateral plantar fibroma    Implants: None   Findings: None   Complications: None   Condition: Stable   Weight bearing status: Non-weight bearing   Activity: Activity as tolerated  Patient may move about with assist as indicated or with supervision   Orthotic management: CAM boot and walker / knee scooter at home    Comments:   Indications for Surgery: Pt. has tried and failed all conservative treatment options and has elected to undergo surgical intervention for their foot and/or ankle issue. The Patient has consented to the surgical procedure described above and has agreed to all post operative protocols.     Details of Surgery:  Pt. was transferred to operative table in the supine position. A tourniquet was applied but not inflated. A time out was performed, and all agreed on correct procedure and laterality of surgery. The surgical area was then anesthetized using the described local anesthetic. The surgical area was then scrubbed prepped and draped in the usual aseptic manner. The tourniquet was then inflated, and the procedure was started.     Attention was directed to the plantar medial plantar fascia just proximal the the 1st metatarsal head right foot. A curvilinear incision was made over the palpable fibroma. The fibroma was adhered to overlying skin and was carefully dissected from the overlying skin layer, using a #15  "blade and mets scissors. The fibroma was exposed taking care to identify and retract any neurovascular structures. Care was then taken to excise the mass of fiberous tissue from the surgical field taking care to identify underlying tissue structures such as the FHL. There seemed to be some involvement of the tendon sheath. The mass was removed from the  Surgical field preserving the FHL and was sent to pathology labelled \"medial plantar fibroma.\" The incision was flushed and layered closure was performed.     Attention was then directed to the lateral fibroma, just distal and medial to the 5th met base. The linear incision was deepend to the level of the plantar fascia, taking care to identify and retract neurovascular structures when encountered. The fibroma came into view and was removed using #15 blade. This was labelled \"lateral Plantar Fibroma\" And sent to pathology. The incision was then flushed and layered tissue closure was performed.     Incisions were then dressed with Xeroform, 4x4s, kerlix and ACE wrap. tourniquet is released and immediate hyperemic response was noted.  The Pt. Was placed in a boot In the PACU and will remain NWB for at least 2 weeks. Pt. Advised to call if she has a reaction to the hydrocortisone. Has not previously had this.      "

## 2023-03-10 NOTE — ANESTHESIA POSTPROCEDURE EVALUATION
Patient: Linda Tomlin    Procedure: Procedure(s):  REMOVAL OF PLANTAR FIBROMAS, RIGHT FOOT       Anesthesia Type:  General    Note:  Disposition: Outpatient   Postop Pain Control: Uneventful            Sign Out: Well controlled pain   PONV: No   Neuro/Psych: Uneventful            Sign Out: Acceptable/Baseline neuro status   Airway/Respiratory: Uneventful            Sign Out: Acceptable/Baseline resp. status   CV/Hemodynamics: Uneventful            Sign Out: Acceptable CV status; No obvious hypovolemia; No obvious fluid overload   Other NRE: NONE   DID A NON-ROUTINE EVENT OCCUR? No           Last vitals:  Vitals Value Taken Time   /73 03/10/23 0931   Temp 35.8  C (96.4  F) 03/10/23 0851   Pulse 80 03/10/23 0935   Resp 15 03/10/23 0935   SpO2 96 % 03/10/23 0935   Vitals shown include unvalidated device data.    Electronically Signed By: Fuad Araujo MD  March 10, 2023  9:54 AM

## 2023-03-10 NOTE — DISCHARGE INSTRUCTIONS
Same Day Surgery Discharge Instructions for  Sedation and General Anesthesia     It's not unusual to feel dizzy, light-headed or faint for up to 24 hours after surgery or while taking pain medication.  If you have these symptoms: sit for a few minutes before standing and have someone assist you when you get up to walk or use the bathroom.    You should rest and relax for the next 24 hours. We recommend you make arrangements to have an adult stay with you for at least 24 hours after your discharge.  Avoid hazardous and strenuous activity.    DO NOT DRIVE any vehicle or operate mechanical equipment for 24 hours following the end of your surgery.  Even though you may feel normal, your reactions may be affected by the medication you have received.    Do not drink alcoholic beverages for 24 hours following surgery.     Slowly progress to your regular diet as you feel able. It's not unusual to feel nauseated and/or vomit after receiving anesthesia.  If you develop these symptoms, drink clear liquids (apple juice, ginger ale, broth, 7-up, etc. ) until you feel better.  If your nausea and vomiting persists for 24 hours, please notify your surgeon.      All narcotic pain medications, along with inactivity and anesthesia, can cause constipation. Drinking plenty of liquids and increasing fiber intake will help.    For any questions of a medical nature, call your surgeon.    Do not make important decisions for 24 hours.    If you had general anesthesia, you may have a sore throat for a couple of days related to the breathing tube used during surgery.  You may use Cepacol lozenges to help with this discomfort.  If it worsens or if you develop a fever, contact your surgeon.     If you feel your pain is not well managed with the pain medications prescribed by your surgeon, please contact your surgeon's office to let them know so they can address your concerns.     Reasons to contact your surgeon:    Signs of possible infection:  Check your incision daily for redness, swelling, warmth, red streaks or foul drainage.   Elevated temperature.  Pain not controlled with pain medication and/or rest.   Uncontrolled nausea or vomiting.  Any questions or concerns.        **If you have questions or concerns about your procedure,   call Dr. Dudley at **

## 2023-03-10 NOTE — ANESTHESIA PROCEDURE NOTES
Airway       Patient location during procedure: OR       Procedure Start/Stop Times: 3/10/2023 7:40 AM  Staff -        Anesthesiologist:  Fuad Araujo MD       CRNA: Burt Perez APRN CRNA       Performed By: CRNA  Consent for Airway        Urgency: elective  Indications and Patient Condition       Indications for airway management: anastacio-procedural       Induction type:intravenous       Mask difficulty assessment: 1 - vent by mask    Final Airway Details       Final airway type: endotracheal airway       Successful airway: ETT - single  Endotracheal Airway Details        ETT size (mm): 7.0       Cuffed: yes       Successful intubation technique: video laryngoscopy       VL Blade Size: Kendall 3       Grade View of Cords: 1       Adjucts: stylet       Position: Right       Measured from: gums/teeth       Secured at (cm): 21       Bite block used: None    Post intubation assessment        Placement verified by: capnometry, equal breath sounds and chest rise        Number of attempts at approach: 1       Number of other approaches attempted: 0       Secured with: pink tape       Ease of procedure: easy       Dentition: Intact and Unchanged    Medication(s) Administered   Medication Administration Time: 3/10/2023 7:40 AM

## 2023-03-14 LAB
PATH REPORT.COMMENTS IMP SPEC: NORMAL
PATH REPORT.COMMENTS IMP SPEC: NORMAL
PATH REPORT.FINAL DX SPEC: NORMAL
PATH REPORT.GROSS SPEC: NORMAL
PATH REPORT.MICROSCOPIC SPEC OTHER STN: NORMAL
PATH REPORT.RELEVANT HX SPEC: NORMAL
PHOTO IMAGE: NORMAL

## 2023-03-15 ENCOUNTER — TELEPHONE (OUTPATIENT)
Dept: INTERNAL MEDICINE | Facility: CLINIC | Age: 63
End: 2023-03-15
Payer: COMMERCIAL

## 2023-03-15 NOTE — TELEPHONE ENCOUNTER
Seeing a chiropractor - found on x-rays fracture on L4 (stress fracture)     Did DEXA with PCP     Chiropractor asking pt to have MRI done - she ordered it but patient states she would be more comfortable having PCP evaluate her spine before chiropractor starts working on her spine.     Asking if PCP would order MRI of her back rather than having chiropractor do it.     Pt states she had asked chiropractor to forward x-rays to PCP     Edwin Carrasco RN  [facility]

## 2023-03-15 NOTE — TELEPHONE ENCOUNTER
Called and left detailed voicemail for the patient with the provider's message. Upon chart review, patient is scheduled for an in-person appointment with Dr. Doran on 4/25/23.    Appointments in Next Year        Apr 25, 2023  1:00 PM  (Arrive by 12:40 PM)  Adult Preventative Visit with Eliceo Doran MD  [facility] ([facility] ) [phone]           If patient would like to have a discussion with Dr. Doran sooner, advised a video visit could be scheduled. Informed patient to call the clinic back if she would like to get a video visit scheduled.     Jesusita Herrmann RN

## 2023-03-15 NOTE — TELEPHONE ENCOUNTER
Patient has not seen me in the clinic physically for over a year.  Suggest follow-up to discuss options.  If no appointments are available I guess this could be done with a video visit.

## 2023-03-16 NOTE — TELEPHONE ENCOUNTER
Called and spoke to the patient. Patient states she would like to wait until her scheduled appointment with Dr. Doran to discuss her back issues further in-person versus scheduling a virtual visit.     Appointments in Next Year        Apr 25, 2023  1:00 PM  (Arrive by 12:40 PM)  Adult Preventative Visit with Eliceo Doran MD  [facility] ([facility] ) [phone]     Closing encounter.     Jesusita Herrmann RN

## 2023-03-26 ENCOUNTER — HEALTH MAINTENANCE LETTER (OUTPATIENT)
Age: 63
End: 2023-03-26

## 2023-03-31 ENCOUNTER — OFFICE VISIT (OUTPATIENT)
Dept: DERMATOLOGY | Facility: CLINIC | Age: 63
End: 2023-03-31
Payer: COMMERCIAL

## 2023-03-31 ENCOUNTER — PATIENT OUTREACH (OUTPATIENT)
Dept: CARE COORDINATION | Facility: CLINIC | Age: 63
End: 2023-03-31

## 2023-03-31 DIAGNOSIS — Z41.1 ELECTIVE PROCEDURE FOR UNACCEPTABLE COSMETIC APPEARANCE: Primary | ICD-10-CM

## 2023-03-31 PROCEDURE — 96999 UNLISTED SPEC DERM SVC/PX: CPT | Performed by: PHYSICIAN ASSISTANT

## 2023-03-31 ASSESSMENT — ACTIVITIES OF DAILY LIVING (ADL): DEPENDENT_IADLS:: INDEPENDENT

## 2023-03-31 NOTE — PROGRESS NOTES
Clinic Care Coordination Contact  Care Team Conversations    RNCC spoke to patient to schedule a possible face to face follow up visit for Friday, 4/7/2023.     Radha Arias RN, BSN, PHN  Primary Care / Care Coordinator   [facility]  E-mail [email or web address]   [phone]

## 2023-03-31 NOTE — LETTER
[address] [facility] [address]    [facility]  Patient Centered Plan of Care  About Me:        Patient Name:  Linda Cheng    YOB: 1960  Age:         63 year old   Whit MRN:    [id number] Telephone Information:  Home Phone [phone]   Mobile [phone]       Address:  [address]  [facility] [address] Email address:  [email or web address]      Emergency Contact(s)    Name Relationship Lgl Grd Work Phone Home Phone Mobile Phone   1. LUIS CHENG* Spouse    [phone]   2. IVY JOYA Mother   [phone]            Primary language:  English     needed? No   [employer]:  [phone] op. 1  Other communication barriers:None    Preferred Method of Communication:  Ximena  Current living arrangement: I live in a private home with family (Patient's 80 year old mother lives with patient and patients )    Mobility Status/ Medical Equipment: Independent    Health Maintenance  Health Maintenance Reviewed: Due/Overdue   Health Maintenance Due   Topic Date Due     ADVANCE CARE PLANNING  Never done     Pneumococcal Vaccine: Pediatrics (0 to 5 Years) and At-Risk Patients (6 to 64 Years) (2 - PCV) 06/15/2013     YEARLY PREVENTIVE VISIT  01/07/2014     DTAP/TDAP/TD IMMUNIZATION (2 - Td or Tdap) 01/26/2022     INFLUENZA VACCINE (1) 09/01/2022     CMP  02/03/2023     LIPID  02/03/2023     TSH W/FREE T4 REFLEX  02/03/2023     LUNG CANCER SCREENING  03/16/2023     NICOTINE/TOBACCO CESSATION COUNSELING Q 1 YR  04/12/2023     My Access Plan  Medical Emergency 911   Primary Clinic Line [facility] - [phone]   24 Hour Appointment Line [phone] or  [phone]) (toll-free)   24 Hour Nurse Line [phone] (toll-free)   Preferred Urgent Care [facility], [phone]     [facility]  [phone]     Preferred Pharmacy  [id number] - [place], [place] - [address]     Behavioral Health Crisis Line The National Suicide Prevention Lifeline at 1-168.923.2964 or Text/Call 988     My Care Team Members  Patient Care Team       Relationship Specialty Notifications Start End    Eliceo Doran MD PCP - General Internal Medicine  3/1/22     Phone: [phone] Fax: [phone]         [address] [place] [address]    Radha Arias RN Lead Care Coordinator  Admissions 1/14/22     Eliceo Doran MD Assigned PCP   1/23/22     Phone: [phone] Fax: [phone]         [address] [place] [address]    Stefan Rdz DO Assigned Neuroscience Provider   3/20/22     Phone: [phone] Fax: [phone]         [address] [facility] [address]    Fuad Lomeli MD Assigned Surgical Provider   7/16/22     Phone: [phone] Pager: [phone] Fax: [phone] [address] [facility] [address]    Abilio Walters MD Assigned Heart and Vascular Provider   12/24/22     Phone: [phone] Fax: [phone] [address] [place] [address]            My Care Plans  Self Management and Treatment Plan  Care Plan  Care Plan: Healthy eating     Problem: Lifestyle choices     Goal: Healthy meal planning and shopping     Start Date: 1/14/2022 Expected End Date: 6/30/2023    This Visit's Progress: Improving    Note:     Goal Statement: I would like assistance and support in maintaining my health and wellness to eat healthy and have a written guide for healthy meal planning and shopping.      Barriers: Patient does not cook or goes grocery shopping; her  did all the cooking  Strengths: Strong advocate for self; strong family support  Patient expressed understanding of goal: Yes  Action steps to achieve this goal:  1. I will follow through with the Registered Dietician's recommendations/suggestions for meal planning  2. I will go to the grocery store  "and buy protein items that do not need to cooked (yogurt, Ensure/Boost, peanut butter, vegetables in small bulk, protein bars, salad mixes...etc)  3. I will use my new Ninja that I recently purchased and read the cook book it came with  4. I will remember to eat by utilizing a \"Paper\" chart on my fridge which is a visual reminder to eat  5. I will review daily the written daily meal planning calendar that will be placed on my fridge  6. I will contact my care team with questions, concerns, support needs   7. I will use the clinic as a resource, and I understand I can contact my clinic with 24/7 after hours services available                     Care Plan: Tobacco cessation     Problem: Lifestyle choices     Goal: Changes in Lifestyle     Start Date: 1/14/2022 Expected End Date: 6/30/2023    This Visit's Progress: Not on track    Note:     Barriers: Smoker for years; Patient quit cold turkey on 1/9/2022; relapse February 2022  Strengths: Strong advocate for self, motivated, engaged  Patient expressed understanding of goal: Yes  Action steps to achieve this goal:  1. I will continue to \"keep busy\" and not think or talk about smoking  2. I will continue quitting cold turkey  3. I will apply a Nicotine patch if I can't control the urge  4. I will  place a Nicorette gum to the inside of my cheek every hour until the urge passes  5. I will contact my care team with questions, concerns, support needs                          Action Plans on File:     Advance Care Plans/Directives Type:   -- (Full Code)      My Medical and Care Information  Problem List   Patient Active Problem List   Diagnosis     Moderate major depression (H)     HTN (hypertension)     Hyperlipidemia LDL goal <100     Encounter for long-term (current) use of medications     Vitamin B12 deficiency without anemia     Closed fracture of one or more phalanges of foot     Vitamin D deficiency     Polycythemia     Low back pain     Ascorbic acid deficiency     " Plantar fascial fibromatosis     Aphasia     TIA (transient ischemic attack)     Acute ischemic stroke (H)      Current Medications and Allergies:    Allergies   Allergen Reactions     Percocet [Oxycodone-Acetaminophen] Rash     Chantix [Varenicline Tartrate] Nausea and Vomiting     SEVERE VOMITING     Codeine GI Disturbance     Current Outpatient Medications   Medication     aspirin (ASA) 81 MG EC tablet     atorvastatin (LIPITOR) 40 MG tablet     buPROPion (WELLBUTRIN XL) 150 MG 24 hr tablet     escitalopram (LEXAPRO) 20 MG tablet     losartan (COZAAR) 50 MG tablet     metoprolol succinate ER (TOPROL XL) 50 MG 24 hr tablet     omeprazole (PRILOSEC) 20 MG DR capsule     pimecrolimus (ELIDEL) 1 % external cream     No current facility-administered medications for this visit.         Care Coordination Start Date: 1/14/2022   Frequency of Care Coordination: monthly     Form Last Updated: 03/31/2023

## 2023-03-31 NOTE — PROGRESS NOTES
HPI:   Chief complaints: Linda Tomlin is a pleasant 63 year old female who presents for cosmetic Botox. She had her crow's feet done at [facility] and loved how it looked. She did feel a little puffy beneath her left eye. No other issues.        PHYSICAL EXAM:    There were no vitals taken for this visit.  Skin exam performed as follows: Type 2 skin. Mood appropriate  Alert and Oriented X 3. Well developed, well nourished in no distress.  General appearance: Normal  Head including face: Normal  Eyes: conjunctiva and lids: Normal  Mouth: Lips, teeth, gums: Normal  Neck: Normal  Cardiovascular: Exam of peripheral vascular system by observation for swelling, varicosities, edema: Normal  Right upper extremity: Normal  Left upper extremity: Normal  Right lower extremity: Normal  Left lower extremity: Normal  Skin: Scalp and body hair: See below    Age appropriate facial rhytids    ASSESSMENT/PLAN:     1. Botox for facial rhytids. Treatment, duration of 3 months, cosmetic cost of $12 per unit discussed at length with patient.    --26 units injected to crows feet (one unit to inferior orbit bilaterally)  --Cosmetic charge of $312    Lot: [id number]  Exp: 11/2025        Follow-up: 3 months  CC:   Scribed By: Jennifer Cai, MS, PA-C

## 2023-03-31 NOTE — LETTER
3/31/2023         RE: Linda Tomlin  [address]  [facility] [address]        Dear Colleague,    Thank you for referring your patient, Linda Tomlin, to the [facility]. Please see a copy of my visit note below.    HPI:   Chief complaints: Linda Tomlin is a pleasant 63 year old female who presents for cosmetic Botox. She had her crow's feet done at [facility] and loved how it looked. She did feel a little puffy beneath her left eye. No other issues.        PHYSICAL EXAM:    There were no vitals taken for this visit.  Skin exam performed as follows: Type 2 skin. Mood appropriate  Alert and Oriented X 3. Well developed, well nourished in no distress.  General appearance: Normal  Head including face: Normal  Eyes: conjunctiva and lids: Normal  Mouth: Lips, teeth, gums: Normal  Neck: Normal  Cardiovascular: Exam of peripheral vascular system by observation for swelling, varicosities, edema: Normal  Right upper extremity: Normal  Left upper extremity: Normal  Right lower extremity: Normal  Left lower extremity: Normal  Skin: Scalp and body hair: See below    Age appropriate facial rhytids    ASSESSMENT/PLAN:     1. Botox for facial rhytids. Treatment, duration of 3 months, cosmetic cost of $12 per unit discussed at length with patient.    --26 units injected to crows feet (one unit to inferior orbit bilaterally)  --Cosmetic charge of $312    Lot: [id number]  Exp: 11/2025        Follow-up: 3 months  CC:   Scribed By: Jennifer Cai MS, PAHUY          Again, thank you for allowing me to participate in the care of your patient.        Sincerely,        Jennifer Cai PA-C

## 2023-04-07 ENCOUNTER — PATIENT OUTREACH (OUTPATIENT)
Dept: CARE COORDINATION | Facility: CLINIC | Age: 63
End: 2023-04-07
Payer: COMMERCIAL

## 2023-04-07 ASSESSMENT — ACTIVITIES OF DAILY LIVING (ADL): DEPENDENT_IADLS:: INDEPENDENT

## 2023-04-07 NOTE — PROGRESS NOTES
"Clinic Care Coordination Contact    Follow Up Progress Note      Assessment:   Patient states her right foot surgery is healing \"very nicely\" and she is able to bear weight with ambulation.  Patient states she's looking forward to Easter with just her immediate family and is looking for hors d'oeuvre recipes.    Patient states she doing a lot better with eating healthier foods adding, \"all bets are off this Easter\".  Patient states she tries to drink water vs soda and is struggling with potato chips and trying to swap them for Thin Oats crackers.    Patient is smoking 2-3 cigarettes per day and is still trying to quit all together.  No other questions or concerns at this time.    Care Gaps:    Health Maintenance Due   Topic Date Due     ADVANCE CARE PLANNING  Never done     Pneumococcal Vaccine: Pediatrics (0 to 5 Years) and At-Risk Patients (6 to 64 Years) (2 - PCV) 06/15/2013     YEARLY PREVENTIVE VISIT  01/07/2014     DTAP/TDAP/TD IMMUNIZATION (2 - Td or Tdap) 01/26/2022     INFLUENZA VACCINE (1) 09/01/2022     CMP  02/03/2023     LIPID  02/03/2023     TSH W/FREE T4 REFLEX  02/03/2023     LUNG CANCER SCREENING  03/16/2023     NICOTINE/TOBACCO CESSATION COUNSELING Q 1 YR  04/12/2023       Care Gaps Last addressed on 4/7/2023    Care Plans  Care Plan: Healthy eating     Problem: Lifestyle choices     Goal: Healthy meal planning and shopping     Start Date: 1/14/2022 Expected End Date: 6/30/2023    This Visit's Progress: Improving Recent Progress: Improving    Note:     Goal Statement: I would like assistance and support in maintaining my health and wellness to eat healthy and have a written guide for healthy meal planning and shopping.      Barriers: Patient does not cook or goes grocery shopping; her  did all the cooking  Strengths: Strong advocate for self; strong family support  Patient expressed understanding of goal: Yes  Action steps to achieve this goal:  1. I will follow through with the Registered " "Dietician's recommendations/suggestions for meal planning  2. I will go to the grocery store and buy protein items that do not need to cooked (yogurt, Ensure/Boost, peanut butter, vegetables in small bulk, protein bars, salad mixes...etc)  3. I will use my new Ninja that I recently purchased and read the cook book it came with  4. I will remember to eat by utilizing a \"Paper\" chart on my fridge which is a visual reminder to eat  5. I will review daily the written daily meal planning calendar that will be placed on my fridge  6. I will contact my care team with questions, concerns, support needs   7. I will use the clinic as a resource, and I understand I can contact my clinic with 24/7 after hours services available                     Care Plan: Tobacco cessation     Problem: Lifestyle choices     Goal: Changes in Lifestyle     Start Date: 1/14/2022 Expected End Date: 6/30/2023    This Visit's Progress: Not on track Recent Progress: Not on track    Note:     Barriers: Smoker for years; Patient quit cold turkey on 1/9/2022; relapse February 2022  Strengths: Strong advocate for self, motivated, engaged  Patient expressed understanding of goal: Yes  Action steps to achieve this goal:  1. I will continue to \"keep busy\" and not think or talk about smoking  2. I will continue quitting cold turkey  3. I will apply a Nicotine patch if I can't control the urge  4. I will  place a Nicorette gum to the inside of my cheek every hour until the urge passes  5. I will contact my care team with questions, concerns, support needs                       Intervention/Education provided during outreach:   RNCC called and spoke with patient/caregiver; introduced self, discussed role of Care Coordination and explained reason for call    Plan:   -Patient will contact the care team with questions, concerns, support needs   -Patient will use the clinic as a resource and understands (s)he can contact the [facility] with 24/7 after hours " services available  -Care Coordinator will remain available as needed  -RNCC will follow up in one month for follow up appointments/recommendations and goal progression     Radha Arias RN, BSN, PHN  Primary Care / Care Coordinator   [facility]  E-mail [email or web address]   [phone]

## 2023-04-25 ENCOUNTER — OFFICE VISIT (OUTPATIENT)
Dept: INTERNAL MEDICINE | Facility: CLINIC | Age: 63
End: 2023-04-25
Payer: COMMERCIAL

## 2023-04-25 VITALS
DIASTOLIC BLOOD PRESSURE: 82 MMHG | RESPIRATION RATE: 17 BRPM | SYSTOLIC BLOOD PRESSURE: 128 MMHG | WEIGHT: 148.2 LBS | OXYGEN SATURATION: 97 % | HEIGHT: 63 IN | BODY MASS INDEX: 26.26 KG/M2 | TEMPERATURE: 98.9 F | HEART RATE: 85 BPM

## 2023-04-25 DIAGNOSIS — E78.5 HYPERLIPIDEMIA LDL GOAL <100: ICD-10-CM

## 2023-04-25 DIAGNOSIS — G45.9 TIA (TRANSIENT ISCHEMIC ATTACK): ICD-10-CM

## 2023-04-25 DIAGNOSIS — I10 PRIMARY HYPERTENSION: ICD-10-CM

## 2023-04-25 DIAGNOSIS — Z12.31 VISIT FOR SCREENING MAMMOGRAM: ICD-10-CM

## 2023-04-25 DIAGNOSIS — R19.5 LOOSE STOOLS: ICD-10-CM

## 2023-04-25 DIAGNOSIS — Z12.11 COLON CANCER SCREENING: ICD-10-CM

## 2023-04-25 DIAGNOSIS — Z87.891 PERSONAL HISTORY OF TOBACCO USE, PRESENTING HAZARDS TO HEALTH: ICD-10-CM

## 2023-04-25 DIAGNOSIS — Z00.00 ROUTINE GENERAL MEDICAL EXAMINATION AT A HEALTH CARE FACILITY: Primary | ICD-10-CM

## 2023-04-25 DIAGNOSIS — F32.9 MAJOR DEPRESSIVE DISORDER, REMISSION STATUS UNSPECIFIED, UNSPECIFIED WHETHER RECURRENT: ICD-10-CM

## 2023-04-25 PROCEDURE — 99214 OFFICE O/P EST MOD 30 MIN: CPT | Mod: 25 | Performed by: INTERNAL MEDICINE

## 2023-04-25 PROCEDURE — 99396 PREV VISIT EST AGE 40-64: CPT | Performed by: INTERNAL MEDICINE

## 2023-04-25 RX ORDER — BUPROPION HYDROCHLORIDE 150 MG/1
150 TABLET ORAL DAILY
Qty: 90 TABLET | Refills: 1 | Status: SHIPPED | OUTPATIENT
Start: 2023-04-25 | End: 2023-10-03

## 2023-04-25 RX ORDER — ESCITALOPRAM OXALATE 20 MG/1
20 TABLET ORAL DAILY
Qty: 90 TABLET | Refills: 3 | Status: SHIPPED | OUTPATIENT
Start: 2023-04-25

## 2023-04-25 RX ORDER — ATORVASTATIN CALCIUM 40 MG/1
40 TABLET, FILM COATED ORAL DAILY
Qty: 90 TABLET | Refills: 3 | Status: SHIPPED | OUTPATIENT
Start: 2023-04-25 | End: 2024-04-30

## 2023-04-25 RX ORDER — LOSARTAN POTASSIUM 50 MG/1
50 TABLET ORAL DAILY
Qty: 90 TABLET | Refills: 3 | Status: SHIPPED | OUTPATIENT
Start: 2023-04-25 | End: 2024-07-18

## 2023-04-25 RX ORDER — METOPROLOL SUCCINATE 50 MG/1
50 TABLET, EXTENDED RELEASE ORAL DAILY
Qty: 90 TABLET | Refills: 3 | Status: SHIPPED | OUTPATIENT
Start: 2023-04-25 | End: 2024-04-19

## 2023-04-25 ASSESSMENT — ENCOUNTER SYMPTOMS
SHORTNESS OF BREATH: 0
HEARTBURN: 1
WEAKNESS: 0
HEMATOCHEZIA: 0
PALPITATIONS: 0
SORE THROAT: 0
CONSTIPATION: 0
PARESTHESIAS: 0
JOINT SWELLING: 0
CHILLS: 0
MYALGIAS: 0
FREQUENCY: 0
ARTHRALGIAS: 1
COUGH: 0
HEMATURIA: 0
DYSURIA: 0
FEVER: 0
DIZZINESS: 0
ABDOMINAL PAIN: 0
NERVOUS/ANXIOUS: 0
NAUSEA: 0
DIARRHEA: 1
EYE PAIN: 0

## 2023-04-25 ASSESSMENT — PATIENT HEALTH QUESTIONNAIRE - PHQ9
10. IF YOU CHECKED OFF ANY PROBLEMS, HOW DIFFICULT HAVE THESE PROBLEMS MADE IT FOR YOU TO DO YOUR WORK, TAKE CARE OF THINGS AT HOME, OR GET ALONG WITH OTHER PEOPLE: SOMEWHAT DIFFICULT
SUM OF ALL RESPONSES TO PHQ QUESTIONS 1-9: 6
SUM OF ALL RESPONSES TO PHQ QUESTIONS 1-9: 6

## 2023-04-25 NOTE — PROGRESS NOTES
SUBJECTIVE:   CC: Linda is an 63 year old who presents for preventive health visit.         Patient has been advised of split billing requirements and indicates understanding: Yes  Healthy Habits:     Getting at least 3 servings of Calcium per day:  Yes    Bi-annual eye exam:  NO    Dental care twice a year:  NO    Sleep apnea or symptoms of sleep apnea:  None    Diet:  Low salt    Frequency of exercise:  2-3 days/week    Duration of exercise:  Less than 15 minutes    Taking medications regularly:  Yes    Medication side effects:  Not applicable    PHQ-2 Total Score: 2    Additional concerns today:  Yes      PROBLEMS TO ADD ON...  1. Discuss quitting smoking, previously tired Chantix with side effects - discussed  2. Scratching at face, previously saw dermatology who stated it was perioral derm. Patient requesting something to help heal sores - discussed  3. Chronic- episodes of explosive diarrhea  (began after colon surgery) - discussed  4. Back issues - discussed    Today's PHQ-2 Score:       4/25/2023    12:45 PM   PHQ-2 ( 1999 [employer])   Q1: Little interest or pleasure in doing things 1   Q2: Feeling down, depressed or hopeless 1   PHQ-2 Score 2   Q1: Little interest or pleasure in doing things Several days   Q2: Feeling down, depressed or hopeless Several days   PHQ-2 Score 2       Have you ever done Advance Care Planning? (For example, a Health Directive, POLST, or a discussion with a medical provider or your loved ones about your wishes): No, advance care planning information given to patient to review.  Advanced care planning was discussed at today's visit.    Social History     Tobacco Use     Smoking status: Every Day     Packs/day: 1.00     Years: 20.00     Pack years: 20.00     Types: Cigarettes     Smokeless tobacco: Never     Tobacco comments:     2-3 a day - working on quitting   Vaping Use     Vaping status: Never Used   Substance Use Topics     Alcohol use: Yes     Alcohol/week: 0.8 - 1.7 standard  drinks of alcohol     Types: 1 - 2 Standard drinks or equivalent per week           4/25/2023    12:45 PM   Alcohol Use   Prescreen: >3 drinks/day or >7 drinks/week? No     Reviewed orders with patient.  Reviewed health maintenance and updated orders accordingly -      Breast Cancer Screening:  Any new diagnosis of family breast, ovarian, or bowel cancer? No    FHS-7:       3/31/2022    11:46 AM 3/31/2022    12:10 PM 3/31/2022    12:12 PM 3/2/2023     2:37 PM 4/25/2023    12:47 PM   Breast CA Risk Assessment (FHS-7)   Did any of your first-degree relatives have breast or ovarian cancer? No No No Yes Yes   Did any of your relatives have bilateral breast cancer? No No No No Unknown   Did any man in your family have breast cancer? No No No No No   Did any woman in your family have breast and ovarian cancer? No No No Unknown Yes   Did any woman in your family have breast cancer before age 50 y? No No No Unknown Unknown   Do you have 2 or more relatives with breast and/or ovarian cancer? No No No Unknown Unknown   Do you have 2 or more relatives with breast and/or bowel cancer? No No No Unknown Unknown       Pertinent mammograms are reviewed under the imaging tab.    History of abnormal Pap smear: NO - age 30-65 PAP every 5 years with negative HPV co-testing recommended     Reviewed and updated as needed this visit by clinical staff   Tobacco  Allergies  Meds              Reviewed and updated as needed this visit by Provider                   Review of Systems   Constitutional: Negative for chills and fever.   HENT: Positive for hearing loss. Negative for congestion, ear pain and sore throat.    Eyes: Negative for pain and visual disturbance.   Respiratory: Negative for cough and shortness of breath.    Cardiovascular: Negative for chest pain, palpitations and peripheral edema.   Gastrointestinal: Positive for diarrhea and heartburn. Negative for abdominal pain, constipation, hematochezia and nausea.   Genitourinary:  "Positive for urgency. Negative for dysuria, frequency, genital sores and hematuria.   Musculoskeletal: Positive for arthralgias. Negative for joint swelling and myalgias.   Skin: Negative for rash.   Neurological: Negative for dizziness, weakness and paresthesias.   Psychiatric/Behavioral: Negative for mood changes. The patient is not nervous/anxious.           OBJECTIVE:   /82   Pulse 85   Temp 98.9  F (37.2  C) (Temporal)   Resp 17   Ht 1.6 m (5' 3\")   Wt 67.2 kg (148 lb 3.2 oz)   SpO2 97%   BMI 26.25 kg/m       Physical Exam  GENERAL: alert and no distress  EYES: Eyes grossly normal to inspection, PERRL and conjunctivae and sclerae normal  Mild healing excoriations facial area  HENT: ear canals and TM's normal, nose and mouth without ulcers or lesions  NECK: no adenopathy, no asymmetry, masses, or scars and thyroid normal to palpation  RESP: lungs clear to auscultation - no rales, rhonchi or wheezes  CV: regular rate and rhythm, normal S1 S2, no S3 or S4, no murmur, click or rub,  ABDOMEN: soft, nontender, no hepatosplenomegaly, no masses and bowel sounds normal  MS: no gross musculoskeletal defects noted, no edema  NEURO: Normal strength and tone, mentation intact and speech normal  PSYCH: mentation appears normal, affect normal/bright      ASSESSMENT/PLAN:   (Z00.00) Routine general medical examination at a health care facility  (primary encounter diagnosis)  Comment: Advised updated vaccinations accordingly as listed.  Patient will do such through her pharmacy  Plan:     (I10) Primary hypertension  Comment: Stable on therapy and continue with current medical management  Plan: COMPREHENSIVE METABOLIC PANEL, losartan         (COZAAR) 50 MG tablet, OFFICE/OUTPT         VISIT,EST,LEVL IV            (E78.5) Hyperlipidemia LDL goal <100  Comment: Labs ordered as fasting per routine  Plan: Lipid panel reflex to direct LDL Non-fasting            (G45.9) TIA (transient ischemic attack)  Comment: Stable and " "continuing with risk reduction therapy, refilled statin therapy  Plan: atorvastatin (LIPITOR) 40 MG tablet,         OFFICE/OUTPT VISIT,EST,LEVL IV            (F32.9) Major depressive disorder, remission status unspecified, unspecified whether recurrent  Comment: Stable per patient and continue with current SSRI therapy.  Check thyroid function  Plan: TSH WITH FREE T4 REFLEX, buPROPion (WELLBUTRIN         XL) 150 MG 24 hr tablet, escitalopram (LEXAPRO)        20 MG tablet, OFFICE/OUTPT VISIT,EST,LEVL IV            (R19.5) Loose stools  Comment: Nonspecific loose stool complaints.  Patient interested in seeing GI.  Referral plan  Plan: OFFICE/OUTPT VISIT,EST,LEVL IV, Adult GI          Referral - Consult Only            (Z87.891) Personal history of tobacco use, presenting hazards to health  Comment: Suggest following up with routine low-dose CT scan of chest  Plan: CT Chest Lung Cancer Screen Low Dose Without,         metoprolol succinate ER           (Z12.31) Visit for screening mammogram  Comment: Ordered as routine screening  Plan: *MA Screening Digital Bilateral            (Z12.11) Colon cancer screening  Comment: Recommend annual FIT testing  Plan: Fecal colorectal cancer screen (FIT)              Patient has been advised of split billing requirements and indicates understanding: Yes      COUNSELING:  Reviewed preventive health counseling, as reflected in patient instructions       Regular exercise       Healthy diet/nutrition      BMI:   Estimated body mass index is 26.25 kg/m  as calculated from the following:    Height as of this encounter: 1.6 m (5' 3\").    Weight as of this encounter: 67.2 kg (148 lb 3.2 oz).         She reports that she has been smoking cigarettes. She has a 20.00 pack-year smoking history. She has never used smokeless tobacco.  Nicotine/Tobacco Cessation Plan:   Information offered: Patient not interested at this time    Eliceo Doran MD  [facility] " GAL  Answers for HPI/ROS submitted by the patient on 4/25/2023  If you checked off any problems, how difficult have these problems made it for you to do your work, take care of things at home, or get along with other people?:  PHQ9 TOTAL SCORE: 6

## 2023-04-27 ENCOUNTER — LAB (OUTPATIENT)
Dept: LAB | Facility: CLINIC | Age: 63
End: 2023-04-27
Payer: COMMERCIAL

## 2023-04-27 DIAGNOSIS — E78.5 HYPERLIPIDEMIA LDL GOAL <100: ICD-10-CM

## 2023-04-27 DIAGNOSIS — Z12.11 COLON CANCER SCREENING: ICD-10-CM

## 2023-04-27 DIAGNOSIS — I10 PRIMARY HYPERTENSION: ICD-10-CM

## 2023-04-27 DIAGNOSIS — F32.9 MAJOR DEPRESSIVE DISORDER, REMISSION STATUS UNSPECIFIED, UNSPECIFIED WHETHER RECURRENT: ICD-10-CM

## 2023-04-27 LAB
ALBUMIN SERPL BCG-MCNC: 4.3 G/DL (ref 3.5–5.2)
ALP SERPL-CCNC: 77 U/L (ref 35–104)
ALT SERPL W P-5'-P-CCNC: 17 U/L (ref 10–35)
ANION GAP SERPL CALCULATED.3IONS-SCNC: 13 MMOL/L (ref 7–15)
AST SERPL W P-5'-P-CCNC: 20 U/L (ref 10–35)
BILIRUB SERPL-MCNC: 0.5 MG/DL
BUN SERPL-MCNC: 5.7 MG/DL (ref 8–23)
CALCIUM SERPL-MCNC: 9.9 MG/DL (ref 8.8–10.2)
CHLORIDE SERPL-SCNC: 101 MMOL/L (ref 98–107)
CHOLEST SERPL-MCNC: 225 MG/DL
CREAT SERPL-MCNC: 0.77 MG/DL (ref 0.51–0.95)
DEPRECATED HCO3 PLAS-SCNC: 28 MMOL/L (ref 22–29)
GFR SERPL CREATININE-BSD FRML MDRD: 87 ML/MIN/1.73M2
GLUCOSE SERPL-MCNC: 104 MG/DL (ref 70–99)
HDLC SERPL-MCNC: 78 MG/DL
LDLC SERPL CALC-MCNC: 130 MG/DL
NONHDLC SERPL-MCNC: 147 MG/DL
POTASSIUM SERPL-SCNC: 4.7 MMOL/L (ref 3.4–5.3)
PROT SERPL-MCNC: 6.7 G/DL (ref 6.4–8.3)
SODIUM SERPL-SCNC: 142 MMOL/L (ref 136–145)
TRIGL SERPL-MCNC: 86 MG/DL
TSH SERPL DL<=0.005 MIU/L-ACNC: 0.88 UIU/ML (ref 0.3–4.2)

## 2023-04-27 PROCEDURE — 80061 LIPID PANEL: CPT

## 2023-04-27 PROCEDURE — 84443 ASSAY THYROID STIM HORMONE: CPT

## 2023-04-27 PROCEDURE — 36415 COLL VENOUS BLD VENIPUNCTURE: CPT

## 2023-04-27 PROCEDURE — 80053 COMPREHEN METABOLIC PANEL: CPT

## 2023-05-04 ENCOUNTER — MYC MEDICAL ADVICE (OUTPATIENT)
Dept: INTERNAL MEDICINE | Facility: CLINIC | Age: 63
End: 2023-05-04
Payer: COMMERCIAL

## 2023-05-04 ENCOUNTER — ANCILLARY PROCEDURE (OUTPATIENT)
Dept: CT IMAGING | Facility: CLINIC | Age: 63
End: 2023-05-04
Attending: INTERNAL MEDICINE
Payer: COMMERCIAL

## 2023-05-04 DIAGNOSIS — Z87.891 PERSONAL HISTORY OF TOBACCO USE, PRESENTING HAZARDS TO HEALTH: ICD-10-CM

## 2023-05-04 PROCEDURE — 71271 CT THORAX LUNG CANCER SCR C-: CPT

## 2023-05-11 ENCOUNTER — TELEPHONE (OUTPATIENT)
Dept: INTERNAL MEDICINE | Facility: CLINIC | Age: 63
End: 2023-05-11
Payer: COMMERCIAL

## 2023-05-11 DIAGNOSIS — F32.9 MAJOR DEPRESSIVE DISORDER, REMISSION STATUS UNSPECIFIED, UNSPECIFIED WHETHER RECURRENT: Primary | ICD-10-CM

## 2023-05-11 NOTE — TELEPHONE ENCOUNTER
A referral has been placed.  You may inform patient that they should be receiving a phone call to schedule

## 2023-05-11 NOTE — TELEPHONE ENCOUNTER
"----- Message from Radha Arias RN sent at 5/11/2023 11:10 AM CDT -----  Regarding: Mental Health referral  Hi Dr. Doran,    Who saw this patient on 4/25/2023 and since patient seeing you, \"things are not good at home\" and patient is requesting a \"Psychologist\".  Wondering if a mental health referral would be a start?    Patient is having issues with her  that she did not want me to share with you or anyone, other than a psychologist.  Thoughts?     Radha Arias RN, BSN, PHN  Primary Care / Care Coordinator   [facility]  E-mail [email or web address]   [phone]      "

## 2023-06-09 ENCOUNTER — TELEPHONE (OUTPATIENT)
Dept: INTERNAL MEDICINE | Facility: CLINIC | Age: 63
End: 2023-06-09
Payer: COMMERCIAL

## 2023-06-09 NOTE — TELEPHONE ENCOUNTER
Patient reports previously having cortisone injections in R knee for pain relief. Patient says PCP is aware of chronic R knee pain and is requesting orders to be placed for next injection, pt thinks last cortisone injection was approximately 2 years ago. RN unable to find this info in patient's chart. Patient is agreeable to scheduling a work-in appointment if PCP is able to see her in the next few weeks.        Can we leave a detailed message on this number? YES  Phone number patient can be reached at: Cell number on file:    Telephone Information:   Mobile [phone]       Cristina Lemos RN  [facility] Triage

## 2023-06-12 NOTE — TELEPHONE ENCOUNTER
This is not something that can be arranged by me.  It has to be arranged by the patient's orthopedist who did the prior injection.

## 2023-06-12 NOTE — TELEPHONE ENCOUNTER
Called and left a detailed message for pt with Dr Potter message from below. Advised pt to call us back if she has any further questions.

## 2023-06-22 ENCOUNTER — TRANSFERRED RECORDS (OUTPATIENT)
Dept: HEALTH INFORMATION MANAGEMENT | Facility: CLINIC | Age: 63
End: 2023-06-22

## 2023-06-22 ENCOUNTER — OFFICE VISIT (OUTPATIENT)
Dept: ORTHOPEDICS | Facility: CLINIC | Age: 63
End: 2023-06-22
Payer: COMMERCIAL

## 2023-06-22 ENCOUNTER — ANCILLARY PROCEDURE (OUTPATIENT)
Dept: GENERAL RADIOLOGY | Facility: CLINIC | Age: 63
End: 2023-06-22
Attending: FAMILY MEDICINE
Payer: COMMERCIAL

## 2023-06-22 VITALS
BODY MASS INDEX: 23.99 KG/M2 | WEIGHT: 144 LBS | DIASTOLIC BLOOD PRESSURE: 84 MMHG | SYSTOLIC BLOOD PRESSURE: 141 MMHG | HEIGHT: 65 IN

## 2023-06-22 DIAGNOSIS — G89.29 CHRONIC PAIN OF BOTH KNEES: Primary | ICD-10-CM

## 2023-06-22 DIAGNOSIS — M25.562 BILATERAL KNEE PAIN: ICD-10-CM

## 2023-06-22 DIAGNOSIS — M25.562 CHRONIC PAIN OF BOTH KNEES: Primary | ICD-10-CM

## 2023-06-22 DIAGNOSIS — M25.561 BILATERAL KNEE PAIN: ICD-10-CM

## 2023-06-22 DIAGNOSIS — M17.0 BILATERAL PRIMARY OSTEOARTHRITIS OF KNEE: ICD-10-CM

## 2023-06-22 DIAGNOSIS — M25.561 CHRONIC PAIN OF BOTH KNEES: Primary | ICD-10-CM

## 2023-06-22 LAB — TSH SERPL-ACNC: 0.7 UIU/ML (ref 0.45–4.5)

## 2023-06-22 PROCEDURE — 99204 OFFICE O/P NEW MOD 45 MIN: CPT | Mod: 25 | Performed by: FAMILY MEDICINE

## 2023-06-22 PROCEDURE — 20610 DRAIN/INJ JOINT/BURSA W/O US: CPT | Mod: 50 | Performed by: FAMILY MEDICINE

## 2023-06-22 PROCEDURE — 73562 X-RAY EXAM OF KNEE 3: CPT | Mod: TC | Performed by: INTERNAL MEDICINE

## 2023-06-22 RX ADMIN — LIDOCAINE HYDROCHLORIDE 4 ML: 10 INJECTION, SOLUTION INFILTRATION; PERINEURAL at 17:34

## 2023-06-22 RX ADMIN — TRIAMCINOLONE ACETONIDE 40 MG: 40 INJECTION, SUSPENSION INTRA-ARTICULAR; INTRAMUSCULAR at 17:34

## 2023-06-22 NOTE — PROGRESS NOTES
"CHIEF COMPLAINT:  Pain of the Left Knee and Pain of the Right Knee       HISTORY OF PRESENT ILLNESS  Ms. Tomlin is a pleasant 63 year old year old female who presents to clinic today with bilateral knee pain.  Linda explains that she also has bilateral knee pain and low back pain.     Onset: gradual, worsening  Location: bilateral knee, anterior aspect and \"inside the knee\"  Quality:  aching, dull, shooting, throbing and tingling  Duration: 15 years   Severity: 6/10 at worst  Timing:intermittent episodes   Modifying factors:  resting and non-use makes it better, movement and use makes it worse  Associated signs & symptoms: pain  Previous similar pain: Yes  Treatments to date: 2-3 cortisone injections in the R knee over the last 15 years with most recent 5 years ago- all injections have provided significant relief for many years    Additional history: as documented    Review of Systems:    Have you recently had a a fever, chills, weight loss? No    Do you have any vision problems? No    Do you have any chest pain or edema? No    Do you have any shortness of breath or wheezing?  No    Do you have stomach problems? No    Do you have any numbness or focal weakness? No    Do you have diabetes? No    Do you have problems with bleeding or clotting? No    Do you have an rashes or other skin lesions? No      MEDICAL HISTORY  Patient Active Problem List   Diagnosis     Moderate major depression (H)     HTN (hypertension)     Hyperlipidemia LDL goal <100     Encounter for long-term (current) use of medications     Vitamin B12 deficiency without anemia     Closed fracture of one or more phalanges of foot     Vitamin D deficiency     Polycythemia     Low back pain     Ascorbic acid deficiency     Plantar fascial fibromatosis     Aphasia     TIA (transient ischemic attack)     Acute ischemic stroke (H)       Current Outpatient Medications   Medication Sig Dispense Refill     aspirin (ASA) 81 MG EC tablet Take 1 tablet (81 mg) " "by mouth daily 90 tablet 1     atorvastatin (LIPITOR) 40 MG tablet Take 1 tablet (40 mg) by mouth daily 90 tablet 3     buPROPion (WELLBUTRIN XL) 150 MG 24 hr tablet Take 1 tablet (150 mg) by mouth daily 90 tablet 1     escitalopram (LEXAPRO) 20 MG tablet Take 1 tablet (20 mg) by mouth daily 90 tablet 3     losartan (COZAAR) 50 MG tablet Take 1 tablet (50 mg) by mouth daily 90 tablet 3     metoprolol succinate ER (TOPROL XL) 50 MG 24 hr tablet Take 1 tablet (50 mg) by mouth daily 90 tablet 3     omeprazole (PRILOSEC) 20 MG DR capsule TAKE 1 CAPSULE BY MOUTH EVERY MORNING BEFORE BREAKFAST 90 capsule 1     pimecrolimus (ELIDEL) 1 % external cream Apply topically 2 times daily (Patient not taking: Reported on 4/25/2023) 60 g 6       Allergies   Allergen Reactions     Percocet [Oxycodone-Acetaminophen] Rash     Chantix [Varenicline Tartrate] Nausea and Vomiting     SEVERE VOMITING     Codeine GI Disturbance       Family History   Problem Relation Age of Onset     Blood Disease Mother         HHT     Allergies Father      Breast Cancer Maternal Grandmother      Cancer Maternal Grandmother         mouth     Allergies Sister      Allergies Paternal Uncle      Cancer - colorectal Maternal Uncle      Allergies Paternal Aunt        Additional medical/Social/Surgical histories reviewed in [facility] and updated as appropriate.       PHYSICAL EXAM  BP (!) 141/84   Ht 1.651 m (5' 5\")   Wt 65.3 kg (144 lb)   BMI 23.96 kg/m      General  - normal appearance, in no obvious distress  Musculoskeletal - bilateral knee  - stance: mildly antalgic gait  - inspection: trace effusion, mild quad atrophy  - palpation: medial joint line tenderness bilaterally  - ROM: 120 degrees flexion, 0 degrees extension, painful terminal flexion  - strength: 5/5 in flexion, 4/5 in extension   - special tests:  (-) varus at 0 and 30 degrees flexion  (-) valgus at 0 and 30 degrees flexion  Neuro  - no sensory or motor deficit, grossly normal coordination, " "normal muscle tone       IMAGING :XR knee bilateral 3 views. Final results and radiologist's interpretation, available in the [facility] health record. Images were reviewed with the patient/family members in the office today. My personal interpretation of the performed imaging is minimal osteoarthritis of bilateral knees at medial compartments.     ASSESSMENT & PLAN  Ms. Tomlin is a 63 year old year old female who presents to clinic today with acute on chronic intermittent bilateral knee pain, worsening recently after foot surgery for plantar fibromas 3/10/2023.    Diagnosis: Chronic pain bilateral knees, Mild osteoarthritis of bilateral knees    Treatments reviewed. I suspect her changes in gait after fibroma removal has played a role in recent knee flare.  Last CSI providing \"5 years\" of relief and we agreed to repeat this today bilaterally.  PT referral was printed for Pujadebby Edmonds so that she can add knee program to her current PT regimen.  Follow up if knee pain worsens or persists, given minimal DJD we could consider additional imaging to provide insight into knee if needed.    PROCEDURE    Bilateral Knee Injection - Intraarticular  The patient was informed of the risks and the benefits of the procedure and a written consent was signed.  The patient s left knee was prepped with chlorhexidine in sterile fashion.   40 mg of triamcinolone suspension was drawn up into a 5 mL syringe with 4 mL of 1% lidocaine.  Injection was performed using substerile technique.  A 1.5-inch 22-gauge needle was used to enter the anterolateral aspect of the left knee.  Injection performed successfully without difficulty.  There were no complications. The patient tolerated the procedure well. There was negligible bleeding.   This procedure as above was repeated for the right knee.  The patient s right knee was prepped with chlorhexidine in sterile fashion. Injection was performed using substerile technique.  A 1.5-inch 22-gauge needle " was used to enter the anterolateral aspect of the right knee.  Injection performed successfully without difficulty.  There were no complications. The patient tolerated the procedure well. There was negligible bleeding.   The patient was instructed to ice the knee upon leaving clinic and refrain from overuse over the next 3 days.   The patient was instructed to call or go to the emergency room with any unusual pain, swelling, redness, or if otherwise concerned.  A follow up appointment will be scheduled to evaluate response to the injection, and to assess range of motion and pain.    Large Joint Injection/Arthocentesis: bilateral knee    Date/Time: 6/22/2023 5:34 PM    Performed by: Ferdinand Mckay DO  Authorized by: Ferdinand Mckay DO    Indications:  Pain  Needle Size:  22 G  Guidance: landmark guided    Approach:  Anterolateral  Location:  Knee  Laterality:  Bilateral      Medications (Right):  40 mg triamcinolone 40 MG/ML; 4 mL lidocaine 1 %  Medications (Left):  40 mg triamcinolone 40 MG/ML; 4 mL lidocaine 1 %  Outcome:  Tolerated well, no immediate complications  Procedure discussed: discussed risks, benefits, and alternatives    Consent Given by:  Patient  Timeout: timeout called immediately prior to procedure    Prep: patient was prepped and draped in usual sterile fashion          It was a pleasure seeing Linda today.    Ferdinand Mckay DO, [facility]  Primary Care Sports Medicine

## 2023-06-22 NOTE — PATIENT INSTRUCTIONS
Thank you for choosing [facility]    DR MCKEON'S CLINIC LOCATIONS  [place] [address] [address] [facility], [address]th [place]   [place], [place], [address] [place] [address]   [phone]       APPOINTMENTS: [phone]    CARE QUESTIONS: [phone],    BILLING QUESTIONS: [phone]    FAX NUMBER: [phone]        Follow up: as needed      1. Bilateral knee pain    2. Bilateral primary osteoarthritis of knee            Steroid Injection Information:    A corticosteroid injection was administered at your visit today.  The area of injection may be sore, slightly swollen for 1-2 days afterward.  Immediately after injection, you may have an area of numbness, which is caused by the local anesthetic, lidocaine (similar to novacaine) in the shot.  This medicine will wear off in about 4 hours.  In addition to the lidocaine, a steroid medication was injected, called triamcinolone acetate.  This medication is intended to provide long-acting antiinflammatory / pain relief.  It may take 2-5 days for this medication to provide noticeable relief.      After an injection, Dr. Mckay recommends:    Protecting the area wearing a bandage or gauze pad for at least 24 hours.    Using ice; ice bag or frozen vegetables over the injection site every one to two hours when able (for 15 minutes at a time).      Avoid any strenuous activity even if the knee is already feeling better immediately afterward. Light stretching is encouraged but refrain from home exercise program and increasing activity level for about 48 hours.    Avoid soaking in a hot tub, bath, or pool for 48 hours after injection. Showering is fine!    Watch for signs of infection, including increasing pain, redness and swelling that last more than 48 hours after injection.

## 2023-06-22 NOTE — LETTER
"    6/22/2023         RE: Linda Tomlin  [address]  [place] [place] [address]        Dear Colleague,    Thank you for referring your patient, Linda Tomlin, to the [facility] [place]. Please see a copy of my visit note below.    CHIEF COMPLAINT:  Pain of the Left Knee and Pain of the Right Knee       HISTORY OF PRESENT ILLNESS  Ms. Tomlin is a pleasant 63 year old year old female who presents to clinic today with bilateral knee pain.  Linda explains that she also has bilateral knee pain and low back pain.     Onset: gradual, worsening  Location: bilateral knee, anterior aspect and \"inside the knee\"  Quality:  aching, dull, shooting, throbing and tingling  Duration: 15 years   Severity: 6/10 at worst  Timing:intermittent episodes   Modifying factors:  resting and non-use makes it better, movement and use makes it worse  Associated signs & symptoms: pain  Previous similar pain: Yes  Treatments to date: 2-3 cortisone injections in the R knee over the last 15 years with most recent 5 years ago- all injections have provided significant relief for many years    Additional history: as documented    Review of Systems:    Have you recently had a a fever, chills, weight loss? No    Do you have any vision problems? No    Do you have any chest pain or edema? No    Do you have any shortness of breath or wheezing?  No    Do you have stomach problems? No    Do you have any numbness or focal weakness? No    Do you have diabetes? No    Do you have problems with bleeding or clotting? No    Do you have an rashes or other skin lesions? No      MEDICAL HISTORY  Patient Active Problem List   Diagnosis     Moderate major depression (H)     HTN (hypertension)     Hyperlipidemia LDL goal <100     Encounter for long-term (current) use of medications     Vitamin B12 deficiency without anemia     Closed fracture of one or more phalanges of foot     Vitamin D deficiency     Polycythemia     Low " "back pain     Ascorbic acid deficiency     Plantar fascial fibromatosis     Aphasia     TIA (transient ischemic attack)     Acute ischemic stroke (H)       Current Outpatient Medications   Medication Sig Dispense Refill     aspirin (ASA) 81 MG EC tablet Take 1 tablet (81 mg) by mouth daily 90 tablet 1     atorvastatin (LIPITOR) 40 MG tablet Take 1 tablet (40 mg) by mouth daily 90 tablet 3     buPROPion (WELLBUTRIN XL) 150 MG 24 hr tablet Take 1 tablet (150 mg) by mouth daily 90 tablet 1     escitalopram (LEXAPRO) 20 MG tablet Take 1 tablet (20 mg) by mouth daily 90 tablet 3     losartan (COZAAR) 50 MG tablet Take 1 tablet (50 mg) by mouth daily 90 tablet 3     metoprolol succinate ER (TOPROL XL) 50 MG 24 hr tablet Take 1 tablet (50 mg) by mouth daily 90 tablet 3     omeprazole (PRILOSEC) 20 MG DR capsule TAKE 1 CAPSULE BY MOUTH EVERY MORNING BEFORE BREAKFAST 90 capsule 1     pimecrolimus (ELIDEL) 1 % external cream Apply topically 2 times daily (Patient not taking: Reported on 4/25/2023) 60 g 6       Allergies   Allergen Reactions     Percocet [Oxycodone-Acetaminophen] Rash     Chantix [Varenicline Tartrate] Nausea and Vomiting     SEVERE VOMITING     Codeine GI Disturbance       Family History   Problem Relation Age of Onset     Blood Disease Mother         HHT     Allergies Father      Breast Cancer Maternal Grandmother      Cancer Maternal Grandmother         mouth     Allergies Sister      Allergies Paternal Uncle      Cancer - colorectal Maternal Uncle      Allergies Paternal Aunt        Additional medical/Social/Surgical histories reviewed in [facility] and updated as appropriate.       PHYSICAL EXAM  BP (!) 141/84   Ht 1.651 m (5' 5\")   Wt 65.3 kg (144 lb)   BMI 23.96 kg/m      General  - normal appearance, in no obvious distress  Musculoskeletal - bilateral knee  - stance: mildly antalgic gait  - inspection: trace effusion, mild quad atrophy  - palpation: medial joint line tenderness bilaterally  - ROM: 120 " "degrees flexion, 0 degrees extension, painful terminal flexion  - strength: 5/5 in flexion, 4/5 in extension   - special tests:  (-) varus at 0 and 30 degrees flexion  (-) valgus at 0 and 30 degrees flexion  Neuro  - no sensory or motor deficit, grossly normal coordination, normal muscle tone       IMAGING :XR knee bilateral 3 views. Final results and radiologist's interpretation, available in the [facility] health record. Images were reviewed with the patient/family members in the office today. My personal interpretation of the performed imaging is minimal osteoarthritis of bilateral knees at medial compartments.     ASSESSMENT & PLAN  Ms. Tomlin is a 63 year old year old female who presents to clinic today with acute on chronic intermittent bilateral knee pain, worsening recently after foot surgery for plantar fibromas 3/10/2023.    Diagnosis: Chronic pain bilateral knees, Mild osteoarthritis of bilateral knees    Treatments reviewed. I suspect her changes in gait after fibroma removal has played a role in recent knee flare.  Last CSI providing \"5 years\" of relief and we agreed to repeat this today bilaterally.  PT referral was printed for Gustavo Edmonds so that she can add knee program to her current PT regimen.  Follow up if knee pain worsens or persists, given minimal DJD we could consider additional imaging to provide insight into knee if needed.    PROCEDURE    Bilateral Knee Injection - Intraarticular  The patient was informed of the risks and the benefits of the procedure and a written consent was signed.  The patient s left knee was prepped with chlorhexidine in sterile fashion.   40 mg of triamcinolone suspension was drawn up into a 5 mL syringe with 4 mL of 1% lidocaine.  Injection was performed using substerile technique.  A 1.5-inch 22-gauge needle was used to enter the anterolateral aspect of the left knee.  Injection performed successfully without difficulty.  There were no complications. The patient " tolerated the procedure well. There was negligible bleeding.   This procedure as above was repeated for the right knee.  The patient s right knee was prepped with chlorhexidine in sterile fashion. Injection was performed using substerile technique.  A 1.5-inch 22-gauge needle was used to enter the anterolateral aspect of the right knee.  Injection performed successfully without difficulty.  There were no complications. The patient tolerated the procedure well. There was negligible bleeding.   The patient was instructed to ice the knee upon leaving clinic and refrain from overuse over the next 3 days.   The patient was instructed to call or go to the emergency room with any unusual pain, swelling, redness, or if otherwise concerned.  A follow up appointment will be scheduled to evaluate response to the injection, and to assess range of motion and pain.    Large Joint Injection/Arthocentesis: bilateral knee    Date/Time: 6/22/2023 5:34 PM    Performed by: Ferdinand Mckay DO  Authorized by: Ferdinand Mckay DO    Indications:  Pain  Needle Size:  22 G  Guidance: landmark guided    Approach:  Anterolateral  Location:  Knee  Laterality:  Bilateral      Medications (Right):  40 mg triamcinolone 40 MG/ML; 4 mL lidocaine 1 %  Medications (Left):  40 mg triamcinolone 40 MG/ML; 4 mL lidocaine 1 %  Outcome:  Tolerated well, no immediate complications  Procedure discussed: discussed risks, benefits, and alternatives    Consent Given by:  Patient  Timeout: timeout called immediately prior to procedure    Prep: patient was prepped and draped in usual sterile fashion          It was a pleasure seeing Linda today.    Ferdinand Mckay DO, [facility]  Primary Care Sports Medicine      Again, thank you for allowing me to participate in the care of your patient.        Sincerely,        Ferdinand Mckay DO

## 2023-06-23 ENCOUNTER — HOSPITAL ENCOUNTER (OUTPATIENT)
Dept: MAMMOGRAPHY | Facility: CLINIC | Age: 63
Discharge: HOME | End: 2023-06-23
Attending: INTERNAL MEDICINE | Admitting: INTERNAL MEDICINE
Payer: COMMERCIAL

## 2023-06-23 DIAGNOSIS — Z12.31 VISIT FOR SCREENING MAMMOGRAM: ICD-10-CM

## 2023-06-23 PROCEDURE — 77067 SCR MAMMO BI INCL CAD: CPT

## 2023-06-23 RX ORDER — TRIAMCINOLONE ACETONIDE 40 MG/ML
40 INJECTION, SUSPENSION INTRA-ARTICULAR; INTRAMUSCULAR
Status: SHIPPED | OUTPATIENT
Start: 2023-06-22

## 2023-06-23 RX ORDER — LIDOCAINE HYDROCHLORIDE 10 MG/ML
4 INJECTION, SOLUTION INFILTRATION; PERINEURAL
Status: SHIPPED | OUTPATIENT
Start: 2023-06-22

## 2023-06-30 ENCOUNTER — OFFICE VISIT (OUTPATIENT)
Dept: DERMATOLOGY | Facility: CLINIC | Age: 63
End: 2023-06-30

## 2023-06-30 ENCOUNTER — TELEPHONE (OUTPATIENT)
Dept: DERMATOLOGY | Facility: CLINIC | Age: 63
End: 2023-06-30

## 2023-06-30 DIAGNOSIS — Z41.1 ELECTIVE PROCEDURE FOR UNACCEPTABLE COSMETIC APPEARANCE: Primary | ICD-10-CM

## 2023-06-30 PROCEDURE — 96999 UNLISTED SPEC DERM SVC/PX: CPT | Performed by: PHYSICIAN ASSISTANT

## 2023-06-30 RX ORDER — PIMECROLIMUS 10 MG/G
CREAM TOPICAL 2 TIMES DAILY
Qty: 60 G | Refills: 6 | Status: SHIPPED | OUTPATIENT
Start: 2023-06-30 | End: 2023-11-01

## 2023-06-30 RX ORDER — PIMECROLIMUS 10 MG/G
CREAM TOPICAL 2 TIMES DAILY
Qty: 60 G | Refills: 6 | Status: CANCELLED | OUTPATIENT
Start: 2023-06-30

## 2023-06-30 ASSESSMENT — PAIN SCALES - GENERAL: PAINLEVEL: NO PAIN (0)

## 2023-06-30 NOTE — CONFIDENTIAL NOTE
Prior Authorization Specialty Medication Request    Medication/Dose: Pimecrolimus (Elidel) 1% cream  ICD code (if different than what is on RX):    Previously Tried and Failed:      Important Lab Values:   Rationale:     Insurance Name:   Insurance ID:   Insurance Phone Number:     Pharmacy Information (if different than what is on RX)  Name:    Phone:

## 2023-06-30 NOTE — PROGRESS NOTES
HPI:   Chief complaints: Linda Tomlin is a pleasant 63 year old female who presents for cosmetic Botox. She had her crow's feet done at [facility] and loved how it looked. She would like the same today.       PHYSICAL EXAM:    There were no vitals taken for this visit.  Skin exam performed as follows: Type 2 skin. Mood appropriate  Alert and Oriented X 3. Well developed, well nourished in no distress.  General appearance: Normal  Head including face: Normal  Eyes: conjunctiva and lids: Normal  Mouth: Lips, teeth, gums: Normal  Neck: Normal  Cardiovascular: Exam of peripheral vascular system by observation for swelling, varicosities, edema: Normal  Right upper extremity: Normal  Left upper extremity: Normal  Right lower extremity: Normal  Left lower extremity: Normal  Skin: Scalp and body hair: See below    Age appropriate facial rhytids    ASSESSMENT/PLAN:     1. Botox for facial rhytids. Treatment, duration of 3 months, cosmetic cost of $12 per unit discussed at length with patient.    --26 units injected to crows feet (one unit to inferior orbit bilaterally)  --Cosmetic charge of $312    Lot: [id number]  Exp: 12/2025        Follow-up: 3 months  CC:   Scribed By: Jennifer Cai, MS, PA-C

## 2023-06-30 NOTE — LETTER
6/30/2023         RE: Linda Tomlin  [address]  [place] [address]        Dear Colleague,    Thank you for referring your patient, Linda Tomlin, to the [facility]. Please see a copy of my visit note below.    HPI:   Chief complaints: Linda Tomlin is a pleasant 63 year old female who presents for cosmetic Botox. She had her crow's feet done at [facility] and loved how it looked. She would like the same today.       PHYSICAL EXAM:    There were no vitals taken for this visit.  Skin exam performed as follows: Type 2 skin. Mood appropriate  Alert and Oriented X 3. Well developed, well nourished in no distress.  General appearance: Normal  Head including face: Normal  Eyes: conjunctiva and lids: Normal  Mouth: Lips, teeth, gums: Normal  Neck: Normal  Cardiovascular: Exam of peripheral vascular system by observation for swelling, varicosities, edema: Normal  Right upper extremity: Normal  Left upper extremity: Normal  Right lower extremity: Normal  Left lower extremity: Normal  Skin: Scalp and body hair: See below    Age appropriate facial rhytids    ASSESSMENT/PLAN:     1. Botox for facial rhytids. Treatment, duration of 3 months, cosmetic cost of $12 per unit discussed at length with patient.    --26 units injected to crows feet (one unit to inferior orbit bilaterally)  --Cosmetic charge of $312    Lot: [id number]  Exp: 12/2025        Follow-up: 3 months  CC:   Scribed By: Jennifer Cai MS, PAHUY          Again, thank you for allowing me to participate in the care of your patient.        Sincerely,        Jennifer Cai PA-C

## 2023-07-06 NOTE — PROGRESS NOTES
S.  Patient was seen [facility] for a cam walker as ordered.  DX:  Post op  O/G. help stabilize foot and ankle.  A.  Patient was fit with a medium short pneumatic Cam Walker for the right leg.  Cam walker was assembled by removing the soft liner from the foot plate and uprights, the patients foot and leg was positioned into the soft liner with the heel firmly sealed.  The liner was closed tightly and secured with the Velcro closure. The heel and foot were positioned in the rocker bottom foot plate and the metal uprights were bent to the shape of the lower leg and secured to the Velcro.  The foot plate Velcro straps were secured, proximal straps first, then the distal strap.  The lower leg straps were attached starting distal and working proximal. The ankle joints were set at 90 degrees of dorsi/plantar flexion. Donning and doffing of the Cam Walker was explained.  P.   Patient was advised to contact this office with any problems or questions.  Marco A WALTERS     no

## 2023-07-11 NOTE — CONFIDENTIAL NOTE
Please notify pt of denial. She can use OTC hydrocortisone cream for the itchy areas as needed but I do want her to take breaks from this as she can (so only use when itchy/irritated)

## 2023-07-11 NOTE — CONFIDENTIAL NOTE
Left detailed message on answering machine for patient to call back.    Dianna DIOP RN  Dermatology   [phone]

## 2023-07-11 NOTE — TELEPHONE ENCOUNTER
PRIOR AUTHORIZATION DENIED    Medication: PIMECROLIMUS 1 % EX CREA  Insurance Company: [employer] - Phone [phone] Fax [phone]  Denial Date: 7/7/2023  Denial Rational:     Appeal Information:     Patient Notified: Yes

## 2023-07-12 NOTE — TELEPHONE ENCOUNTER
Called and spoke with pt, she got my message yesterday with the info from Jennifer and has no questions.    Thank you,  Dianna DIOP RN  Dermatology   [phone]

## 2023-07-12 NOTE — PROGRESS NOTES
Clinic Care Coordination Contact  [facility]/Voicemail    Referral Source: Care Team  Clinical Data: Care Coordinator Outreach  Outreach attempted x 2.  Left message on patient's voicemail with call back information and requested return call.    Plan: Care Coordinator will try to reach patient again in 1 month.     Radha Arias RN, BSN, PHN  Primary Care / Care Coordinator   [facility]  E-mail [email or web address]   [phone]       [Annual Wellness Visit] : an annual wellness visit [FreeTextEntry1] : Patient here for annual exam, no complaints at all today.

## 2023-08-11 DIAGNOSIS — J34.1 MUCOCELE OF NASAL CAVITY: Primary | ICD-10-CM

## 2023-08-14 ENCOUNTER — TELEPHONE (OUTPATIENT)
Dept: OTOLARYNGOLOGY | Facility: CLINIC | Age: 63
End: 2023-08-14
Payer: COMMERCIAL

## 2023-08-15 ENCOUNTER — PATIENT OUTREACH (OUTPATIENT)
Dept: NURSING | Facility: CLINIC | Age: 63
End: 2023-08-15
Payer: COMMERCIAL

## 2023-08-15 ASSESSMENT — ACTIVITIES OF DAILY LIVING (ADL): DEPENDENT_IADLS:: INDEPENDENT

## 2023-08-15 NOTE — PROGRESS NOTES
"Clinic Care Coordination Contact  Follow Up Progress Note      Assessment:   Patient states she's having issues with \"explosive diarrhea\" and feels she's in need of a colonoscopy yet doesn't know where to start.  Per patient, she believes [facility] has been calling her yet she's unsure.    RNCC reviewed patient's chart and gave patient the number to Dr. Russ Chaney, [facility], [phone], to call to schedule follow up visit with possible scheduling of colonoscopy.  Patient verbalized her understanding stating she will call to schedule a follow up visit.    Patient reports she's having issues with her sinuses and has an appointment scheduled with ENT/Dr. Kamara 4/2024 and is happy even thought the date is out a ways.    Care Gaps:    Health Maintenance Due   Topic Date Due    Pneumococcal Vaccine: Pediatrics (0 to 5 Years) and At-Risk Patients (6 to 64 Years) (2 - PCV) 06/15/2013    DTAP/TDAP/TD IMMUNIZATION (2 - Td or Tdap) 01/26/2022       Care Gaps Last addressed on 8/15/2023    Care Plans  Care Plan: Healthy eating       Problem: Lifestyle choices       Goal: Healthy meal planning and shopping       Start Date: 1/14/2022 Expected End Date: 9/29/2023    This Visit's Progress: On track Recent Progress: On track    Note:     Goal Statement: I would like assistance and support in maintaining my health and wellness to eat healthy and have a written guide for healthy meal planning and shopping.      Barriers: Patient does not cook or goes grocery shopping; her  did all the cooking  Strengths: Strong advocate for self; strong family support  Patient expressed understanding of goal: Yes  Action steps to achieve this goal:  1. I will follow through with the Registered Dietician's recommendations/suggestions for meal planning  2. I will go to the grocery store and buy protein items that do not need to cooked (yogurt, Ensure/Boost, peanut butter, vegetables in small bulk, " "protein bars, salad mixes...etc)  3. I will use my new Ninja that I recently purchased and read the cook book it came with  4. I will remember to eat by utilizing a \"Paper\" chart on my fridge which is a visual reminder to eat  5. I will review daily the written daily meal planning calendar that will be placed on my fridge  6. I will contact my care team with questions, concerns, support needs   7. I will use the clinic as a resource, and I understand I can contact my clinic with 24/7 after hours services available                             Care Plan: Tobacco cessation       Problem: Lifestyle choices       Goal: Changes in Lifestyle       Start Date: 1/14/2022 Expected End Date: 9/29/2023    This Visit's Progress: Not on track Recent Progress: Not on track    Note:     Barriers: Smoker for years; Patient quit cold turkey on 1/9/2022; relapse February 2022  Strengths: Strong advocate for self, motivated, engaged  Patient expressed understanding of goal: Yes  Action steps to achieve this goal:  1. I will continue to \"keep busy\" and not think or talk about smoking  2. I will continue quitting cold turkey  3. I will apply a Nicotine patch if I can't control the urge  4. I will  place a Nicorette gum to the inside of my cheek every hour until the urge passes  5. I will contact my care team with questions, concerns, support needs                             Intervention/Education provided during outreach:   RNCC called and spoke with patient/caregiver; introduced self, discussed role of Care Coordination and explained reason for call    Plan:   -Patient will contact the care team with questions, concerns, support needs   -Patient will use the clinic as a resource and understands (s)he can contact the [place] clinic with 24/7 after hours services available  -Care Coordinator will remain available as needed  -RNCC will follow up in one month for follow up appointments/recommendations and goal progression     Radha Arias" RN, BSN, PHN  Primary Care / Care Coordinator   [facility]  E-mail [email or web address]   [phone]

## 2023-08-18 ENCOUNTER — ANCILLARY PROCEDURE (OUTPATIENT)
Dept: CT IMAGING | Facility: CLINIC | Age: 63
End: 2023-08-18
Attending: OTOLARYNGOLOGY
Payer: COMMERCIAL

## 2023-08-18 DIAGNOSIS — J34.1 MUCOCELE OF NASAL CAVITY: ICD-10-CM

## 2023-08-18 PROCEDURE — 70486 CT MAXILLOFACIAL W/O DYE: CPT

## 2023-08-22 ENCOUNTER — TELEPHONE (OUTPATIENT)
Dept: INTERNAL MEDICINE | Facility: CLINIC | Age: 63
End: 2023-08-22
Payer: COMMERCIAL

## 2023-08-22 DIAGNOSIS — Z79.899 MEDICATION MANAGEMENT: Primary | ICD-10-CM

## 2023-08-22 DIAGNOSIS — K21.9 GASTROESOPHAGEAL REFLUX DISEASE WITHOUT ESOPHAGITIS: ICD-10-CM

## 2023-08-22 NOTE — TELEPHONE ENCOUNTER
Pt is requesting a MTM referral.     Pt would like to discuss her list of meds and reasons for taking. Routing to provider    Please review pending referral and sign if approved.

## 2023-08-22 NOTE — TELEPHONE ENCOUNTER
Please notify patient there are refills on file at her pharmacy.     Jennifer PEARSONN, RN  [facility]

## 2023-08-31 ENCOUNTER — OFFICE VISIT (OUTPATIENT)
Dept: INTERNAL MEDICINE | Facility: CLINIC | Age: 63
End: 2023-08-31
Payer: COMMERCIAL

## 2023-08-31 ENCOUNTER — TELEPHONE (OUTPATIENT)
Dept: OTOLARYNGOLOGY | Facility: CLINIC | Age: 63
End: 2023-08-31

## 2023-08-31 VITALS
DIASTOLIC BLOOD PRESSURE: 80 MMHG | OXYGEN SATURATION: 97 % | SYSTOLIC BLOOD PRESSURE: 112 MMHG | RESPIRATION RATE: 18 BRPM | WEIGHT: 140.5 LBS | TEMPERATURE: 98.5 F | HEIGHT: 66 IN | HEART RATE: 85 BPM | BODY MASS INDEX: 22.58 KG/M2

## 2023-08-31 DIAGNOSIS — Z72.0 TOBACCO ABUSE: ICD-10-CM

## 2023-08-31 DIAGNOSIS — J01.90 ACUTE SINUSITIS WITH SYMPTOMS > 10 DAYS: Primary | ICD-10-CM

## 2023-08-31 PROCEDURE — 99214 OFFICE O/P EST MOD 30 MIN: CPT | Performed by: INTERNAL MEDICINE

## 2023-08-31 RX ORDER — CEFDINIR 300 MG/1
300 CAPSULE ORAL 2 TIMES DAILY
Qty: 10 CAPSULE | Refills: 0 | Status: CANCELLED | OUTPATIENT
Start: 2023-08-31

## 2023-08-31 RX ORDER — GUAIFENESIN/DEXTROMETHORPHAN 100-10MG/5
10 SYRUP ORAL EVERY 4 HOURS PRN
Qty: 473 ML | Refills: 0 | Status: SHIPPED | OUTPATIENT
Start: 2023-08-31 | End: 2023-11-01

## 2023-08-31 RX ORDER — GUAIFENESIN/DEXTROMETHORPHAN 100-10MG/5
10 SYRUP ORAL EVERY 4 HOURS PRN
Qty: 473 ML | Refills: 0 | Status: CANCELLED | OUTPATIENT
Start: 2023-08-31

## 2023-08-31 RX ORDER — NICOTINE 21 MG/24HR
1 PATCH, TRANSDERMAL 24 HOURS TRANSDERMAL EVERY 24 HOURS
Qty: 28 PATCH | Refills: 1 | Status: SHIPPED | OUTPATIENT
Start: 2023-08-31 | End: 2023-11-01

## 2023-08-31 ASSESSMENT — ENCOUNTER SYMPTOMS
COUGH: 1
SORE THROAT: 1

## 2023-08-31 NOTE — TELEPHONE ENCOUNTER
[facility] Call Center    Phone Message    May a detailed message be left on voicemail: yes     Reason for Call: Other: Patient saw primary has current sinus infection was given antibiotics but provider stated will likely get another infection. Please contact to go over current symptom.      Action Taken: Other: ENT    Travel Screening: Not Applicable

## 2023-08-31 NOTE — PROGRESS NOTES
Assessment & Plan   Acute sinusitis with symptoms > 10 days  Recent CT sinus shows a completely opacified sinus. Likely worsening of that known condition. Will treat with course of Augmentin but discussed that 7 days of antibiotics won't be enough to full kick this. She plans to follow up with her ENT to discuss more definitive treatment. Robitussin DM PRN for cough.  - amoxicillin-clavulanate (AUGMENTIN) 875-125 MG tablet; Take 1 tablet by mouth 2 times daily  - guaiFENesin-dextromethorphan (ROBITUSSIN DM) 100-10 MG/5ML syrup; Take 10 mLs by mouth every 4 hours as needed for cough    Tobacco abuse  She was open to trying patches to quit. Discussed how to step down to 7mg patches and then off.  - nicotine (NICODERM CQ) 14 MG/24HR 24 hr patch; Place 1 patch onto the skin every 24 hours    F/u with regular PCP at regular interval or sooner PRN    Rio Jackson MD  [facility]    Camden Mckeon is a 63 year old who presents for a same day acute care visit with chief concern of:  Cough, Pharyngitis, and Nasal Congestion    History of Present Illness       Reason for visit:  Sinuses  Symptom onset:  1-2 weeks ago  Symptoms include:  Facial discomfort, sore throat, cough  Symptom intensity:  Moderate  Symptom progression:  Worsening  Had these symptoms before:  Yes  Has tried/received treatment for these symptoms:  Yes  Previous treatment was successful:  Yes  Prior treatment description:  And I biotics and cough medicine  What makes it worse:  No  What makes it better:  Ice cream or hot soup    She eats 2-3 servings of fruits and vegetables daily.She consumes 2 sweetened beverage(s) daily.She exercises with enough effort to increase her heart rate 9 or less minutes per day.  She exercises with enough effort to increase her heart rate 4 days per week.   She is taking medications regularly.         Review of Systems   HENT:  Positive for sore throat.    Respiratory:  Positive for cough.   "         Objective    /80   Pulse 85   Temp 98.5  F (36.9  C) (Tympanic)   Resp 18   Ht 1.664 m (5' 5.5\")   Wt 63.7 kg (140 lb 8 oz)   SpO2 97%   BMI 23.02 kg/m    Body mass index is 23.02 kg/m .    Physical Exam   GENERAL: alert and in no distress.  EYES: conjunctivae/corneas clear. EOMs grossly intact  HENT: Facies symmetric.  RESP: CTAB, no w/r/r. Intermittent cough.  MSK: Moves all four extremities freely.  SKIN: No significant ulcers, lesions, or rashes on the visualized portions of the skin  NEURO: CN II-XII grossly intact.  "

## 2023-09-01 NOTE — TELEPHONE ENCOUNTER
Pt is to finish her current course of antibiotics as she just started them. Able to schedule a sooner appt per [facility] if any changes or worsen symptoms. No further questions.    Theresa Reyna LPN

## 2023-09-21 NOTE — PROGRESS NOTES
Otolaryngology Adult Consultation    Patient: Linda Tomlin   : 1960     Patient presents with:  Consult: No chief complaint on file.       Referring Provider: No ref. provider found   Consulting Physician:  Laura Kamara MD       Assessment/Plan: Patient with mucocele, reduced in size (no longer involves ethmoid sinus) and she does not have specific associated symptoms. Exam is normal today. I will repeat the CT at 6 months. She is comfortable with that plan.      HPI: Linda Tomlin is a 63 year old female seen today in the Otolaryngology Clinic for follow-up of a right sided mucocele found on MRI when she had a TIA. I repeated imaging and her CT shows that it has reduced in size. She has had URI/sinus symptoms a few times in the last several months, but has recovered from this. She denies prior sinus surgery.     amoxicillin-clavulanate (AUGMENTIN) 875-125 MG tablet, Take 1 tablet by mouth 2 times daily  aspirin (ASA) 81 MG EC tablet, Take 1 tablet (81 mg) by mouth daily  atorvastatin (LIPITOR) 40 MG tablet, Take 1 tablet (40 mg) by mouth daily  buPROPion (WELLBUTRIN XL) 150 MG 24 hr tablet, Take 1 tablet (150 mg) by mouth daily  escitalopram (LEXAPRO) 20 MG tablet, Take 1 tablet (20 mg) by mouth daily  guaiFENesin-dextromethorphan (ROBITUSSIN DM) 100-10 MG/5ML syrup, Take 10 mLs by mouth every 4 hours as needed for cough  losartan (COZAAR) 50 MG tablet, Take 1 tablet (50 mg) by mouth daily  metoprolol succinate ER (TOPROL XL) 50 MG 24 hr tablet, Take 1 tablet (50 mg) by mouth daily  nicotine (NICODERM CQ) 14 MG/24HR 24 hr patch, Place 1 patch onto the skin every 24 hours  omeprazole (PRILOSEC) 20 MG DR capsule, TAKE 1 CAPSULE BY MOUTH EVERY MORNING BEFORE BREAKFAST  pimecrolimus (ELIDEL) 1 % external cream, Apply topically 2 times daily (Patient not taking: Reported on 2023)    lidocaine 1 % injection 4 mL  lidocaine 1 % injection 4 mL  triamcinolone (KENALOG-40) injection 40  mg  triamcinolone (KENALOG-40) injection 40 mg           Allergies   Allergen Reactions    Percocet [Oxycodone-Acetaminophen] Rash    Chantix [Varenicline Tartrate] Nausea and Vomiting     SEVERE VOMITING    Codeine GI Disturbance       Past Medical History:   Diagnosis Date    Breast cancer (H) 2009    lumpectomy and radiation x 35    Diverticular disease of colon     Ovarian cyst     TIA (transient ischemic attack) 01/10/2022       Social History     Occupational History    Not on file   Tobacco Use    Smoking status: Every Day     Packs/day: 1.00     Years: 20.00     Pack years: 20.00     Types: Cigarettes    Smokeless tobacco: Never    Tobacco comments:     2-3 a day - working on quitting   Vaping Use    Vaping Use: Never used   Substance and Sexual Activity    Alcohol use: Yes     Alcohol/week: 0.8 - 1.7 standard drinks of alcohol     Types: 1 - 2 Standard drinks or equivalent per week    Drug use: No    Sexual activity: Yes     Partners: Male        Review of Systems       No data to display                 14 point ROS neg other than the symptoms noted above.    Physical Exam:    General Assessment   The patient is alert, oriented and in no acute distress.   Head/Face/Scalp  Grossly normal.   Nose  External nose is straight, skin is normal. Intranasal exam (anterior rhinoscopy) reveals normal moist mucosa, turbinate tissue without edema, erythema or crusting.  Septum mainly in the midline.      Procedure:  Endoscopy indicated for sinus exam  Topical anesthetic/decongestant spray applied.  Rigid scope used for visualization.  Findings: Well visualized left middle meatus and posterior meatus without mass, polyp, abnormal mucous.       Imaging:    Results for orders placed in visit on 08/18/23    CT FACIAL BONES WITHOUT CONTRAST    Status: Normal 8/20/2023    Narrative  EXAM: CT FACIAL BONES WITHOUT CONTRAST  LOCATION: [facility]  DATE: 8/18/2023    INDICATION: Mucocele of nasal  cavity  COMPARISON: None.  TECHNIQUE: Routine without contrast. Multiplanar reformats. Dose reduction techniques were used.    FINDINGS:    FRONTAL SINUSES: Trace mucosal thickening of the inferior left frontal sinus. Right frontal sinus is well-aerated.    ETHMOID SINUSES: Interval postsurgical changes including partial ethmoidectomy. Trace mucosal thickening of the anterior left ethmoid air cells. Right ethmoid air cells are well-aerated and the previously visualized right ethmoid structure is no longer  present.    SPHENOID SINUSES: Complete opacification of the right sphenoid sinus. Left sphenoid sinus is well-aerated.    MAXILLARY SINUSES: Status post bilateral maxillary uncinectomy with antrostomy formation. Maxillary sinuses are well-aerated. The floors of the maxillary sinuses are intact.    NASAL CAVITY/SKULL BASE: Intact nasal septum. Unremarkable nasal turbinates. The cribriform plate is intact and symmetric. The anterior clinoid processes are not pneumatized.    PARANASAL SINUS DRAINAGE PATHWAYS:  Right sphenoethmoidal recess is obstructed. Remaining paranasal sinus drainage pathways are patent.    NON-SINUS STRUCTURES: No abnormality of the visualized orbits or intracranial compartment.    Impression  IMPRESSION:  1.  Complete opacification of the right sphenoid sinus with obstruction of the right sphenoid ethmoid recess.  2.  Trace mucosal thickening of the left frontal sinus and left anterior ethmoid air cells.  3.  Previously visualized right ethmoid structure is no longer present.        20 minutes spent by me on the date of the encounter doing chart review, history and exam, documentation and further activities per the note. This time is in addition to separately billable procedure.

## 2023-09-22 ENCOUNTER — OFFICE VISIT (OUTPATIENT)
Dept: OTOLARYNGOLOGY | Facility: CLINIC | Age: 63
End: 2023-09-22
Payer: COMMERCIAL

## 2023-09-22 VITALS
HEIGHT: 66 IN | SYSTOLIC BLOOD PRESSURE: 150 MMHG | HEART RATE: 74 BPM | WEIGHT: 142 LBS | OXYGEN SATURATION: 97 % | BODY MASS INDEX: 22.82 KG/M2 | DIASTOLIC BLOOD PRESSURE: 86 MMHG

## 2023-09-22 DIAGNOSIS — J34.1 MUCOCELE OF NASAL CAVITY: Primary | ICD-10-CM

## 2023-09-22 PROCEDURE — 99202 OFFICE O/P NEW SF 15 MIN: CPT | Mod: 25 | Performed by: OTOLARYNGOLOGY

## 2023-09-22 PROCEDURE — 31231 NASAL ENDOSCOPY DX: CPT | Performed by: OTOLARYNGOLOGY

## 2023-09-22 ASSESSMENT — PAIN SCALES - GENERAL: PAINLEVEL: NO PAIN (0)

## 2023-09-22 NOTE — LETTER
2023       RE: Linda Tomlin  [id number]  [place] [address]     Dear Colleague,    Thank you for referring your patient, Linda Tomlin, to the [facility] [place] at [facility]. Please see a copy of my visit note below.    Otolaryngology Adult Consultation    Patient: Linda Tomlin   : 1960     Patient presents with:  Consult: No chief complaint on file.       Referring Provider: No ref. provider found   Consulting Physician:  Laura Kamara MD       Assessment/Plan: Patient with mucocele, reduced in size (no longer involves ethmoid sinus) and she does not have specific associated symptoms. Exam is normal today. I will repeat the CT at 6 months. She is comfortable with that plan.      HPI: Linda Tomlin is a 63 year old female seen today in the Otolaryngology Clinic for follow-up of a right sided mucocele found on MRI when she had a TIA. I repeated imaging and her CT shows that it has reduced in size. She has had URI/sinus symptoms a few times in the last several months, but has recovered from this. She denies prior sinus surgery.     amoxicillin-clavulanate (AUGMENTIN) 875-125 MG tablet, Take 1 tablet by mouth 2 times daily  aspirin (ASA) 81 MG EC tablet, Take 1 tablet (81 mg) by mouth daily  atorvastatin (LIPITOR) 40 MG tablet, Take 1 tablet (40 mg) by mouth daily  buPROPion (WELLBUTRIN XL) 150 MG 24 hr tablet, Take 1 tablet (150 mg) by mouth daily  escitalopram (LEXAPRO) 20 MG tablet, Take 1 tablet (20 mg) by mouth daily  guaiFENesin-dextromethorphan (ROBITUSSIN DM) 100-10 MG/5ML syrup, Take 10 mLs by mouth every 4 hours as needed for cough  losartan (COZAAR) 50 MG tablet, Take 1 tablet (50 mg) by mouth daily  metoprolol succinate ER (TOPROL XL) 50 MG 24 hr tablet, Take 1 tablet (50 mg) by mouth daily  nicotine (NICODERM CQ) 14 MG/24HR 24 hr patch, Place 1 patch onto the skin every 24  hours  omeprazole (PRILOSEC) 20 MG DR capsule, TAKE 1 CAPSULE BY MOUTH EVERY MORNING BEFORE BREAKFAST  pimecrolimus (ELIDEL) 1 % external cream, Apply topically 2 times daily (Patient not taking: Reported on 8/31/2023)    lidocaine 1 % injection 4 mL  lidocaine 1 % injection 4 mL  triamcinolone (KENALOG-40) injection 40 mg  triamcinolone (KENALOG-40) injection 40 mg           Allergies   Allergen Reactions    Percocet [Oxycodone-Acetaminophen] Rash    Chantix [Varenicline Tartrate] Nausea and Vomiting     SEVERE VOMITING    Codeine GI Disturbance       Past Medical History:   Diagnosis Date    Breast cancer (H) 2009    lumpectomy and radiation x 35    Diverticular disease of colon     Ovarian cyst     TIA (transient ischemic attack) 01/10/2022       Social History     Occupational History    Not on file   Tobacco Use    Smoking status: Every Day     Packs/day: 1.00     Years: 20.00     Pack years: 20.00     Types: Cigarettes    Smokeless tobacco: Never    Tobacco comments:     2-3 a day - working on quitting   Vaping Use    Vaping Use: Never used   Substance and Sexual Activity    Alcohol use: Yes     Alcohol/week: 0.8 - 1.7 standard drinks of alcohol     Types: 1 - 2 Standard drinks or equivalent per week    Drug use: No    Sexual activity: Yes     Partners: Male        Review of Systems       No data to display                 14 point ROS neg other than the symptoms noted above.    Physical Exam:    General Assessment   The patient is alert, oriented and in no acute distress.   Head/Face/Scalp  Grossly normal.   Nose  External nose is straight, skin is normal. Intranasal exam (anterior rhinoscopy) reveals normal moist mucosa, turbinate tissue without edema, erythema or crusting.  Septum mainly in the midline.      Procedure:  Endoscopy indicated for sinus exam  Topical anesthetic/decongestant spray applied.  Rigid scope used for visualization.  Findings: Well visualized left middle meatus and posterior meatus  without mass, polyp, abnormal mucous.       Imaging:    Results for orders placed in visit on 08/18/23    CT FACIAL BONES WITHOUT CONTRAST    Status: Normal 8/20/2023    Narrative  EXAM: CT FACIAL BONES WITHOUT CONTRAST  LOCATION: [facility]  DATE: 8/18/2023    INDICATION: Mucocele of nasal cavity  COMPARISON: None.  TECHNIQUE: Routine without contrast. Multiplanar reformats. Dose reduction techniques were used.    FINDINGS:    FRONTAL SINUSES: Trace mucosal thickening of the inferior left frontal sinus. Right frontal sinus is well-aerated.    ETHMOID SINUSES: Interval postsurgical changes including partial ethmoidectomy. Trace mucosal thickening of the anterior left ethmoid air cells. Right ethmoid air cells are well-aerated and the previously visualized right ethmoid structure is no longer  present.    SPHENOID SINUSES: Complete opacification of the right sphenoid sinus. Left sphenoid sinus is well-aerated.    MAXILLARY SINUSES: Status post bilateral maxillary uncinectomy with antrostomy formation. Maxillary sinuses are well-aerated. The floors of the maxillary sinuses are intact.    NASAL CAVITY/SKULL BASE: Intact nasal septum. Unremarkable nasal turbinates. The cribriform plate is intact and symmetric. The anterior clinoid processes are not pneumatized.    PARANASAL SINUS DRAINAGE PATHWAYS:  Right sphenoethmoidal recess is obstructed. Remaining paranasal sinus drainage pathways are patent.    NON-SINUS STRUCTURES: No abnormality of the visualized orbits or intracranial compartment.    Impression  IMPRESSION:  1.  Complete opacification of the right sphenoid sinus with obstruction of the right sphenoid ethmoid recess.  2.  Trace mucosal thickening of the left frontal sinus and left anterior ethmoid air cells.  3.  Previously visualized right ethmoid structure is no longer present.        20 minutes spent by me on the date of the encounter doing chart review, history and exam,  documentation and further activities per the note. This time is in addition to separately billable procedure.                 Again, thank you for allowing me to participate in the care of your patient.      Sincerely,    Laura Kamara MD

## 2023-09-22 NOTE — PATIENT INSTRUCTIONS
You were seen in the ENT Clinic today by Dr. Kamara. If you have any questions or concerns after your appointment, please contact us (see below)       2.   The following recommendations have been made based upon your appointment today:   -Repeat CT in 6 months (February 2024). Dr. Kamara will call you with results.             How to Contact Us:  Send a Sviral message to your provider. Our team will respond to you via Sviral. Occasionally, we will need to call you to get further information.  For urgent matters (Monday-Friday), call the ENT Clinic: [phone] and speak with a call center team member - they will route your call appropriately.   If you'd like to speak directly with a nurse, please find our contact information below. We do our best to check voicemail frequently throughout the day, and will work to call you back within 1-2 days. For urgent matters, please use the general clinic phone numbers listed above.        Ro WARE RN  ENT RN Care Coordinator  Direct: [phone]  Alice PICKETT LPN  Direct: [phone]         [facility]  Department of Otolaryngology

## 2023-09-30 DIAGNOSIS — F32.9 MAJOR DEPRESSIVE DISORDER, REMISSION STATUS UNSPECIFIED, UNSPECIFIED WHETHER RECURRENT: ICD-10-CM

## 2023-09-30 DIAGNOSIS — K21.9 GASTROESOPHAGEAL REFLUX DISEASE WITHOUT ESOPHAGITIS: ICD-10-CM

## 2023-10-03 RX ORDER — BUPROPION HYDROCHLORIDE 150 MG/1
150 TABLET ORAL DAILY
Qty: 90 TABLET | Refills: 0 | Status: SHIPPED | OUTPATIENT
Start: 2023-10-03 | End: 2023-11-01

## 2023-10-03 NOTE — TELEPHONE ENCOUNTER
Prescription approved per [facility] Refill Protocol.  Jesusita Herrmann, RN  [facility] Triage Nurse

## 2023-10-27 ENCOUNTER — PATIENT OUTREACH (OUTPATIENT)
Dept: NURSING | Facility: CLINIC | Age: 63
End: 2023-10-27
Payer: COMMERCIAL

## 2023-10-27 ASSESSMENT — ACTIVITIES OF DAILY LIVING (ADL): DEPENDENT_IADLS:: INDEPENDENT

## 2023-10-27 NOTE — PROGRESS NOTES
Clinic Care Coordination Contact  Follow Up Progress Note      Assessment:   Patient is calling about her , Alejandro, wondering what she should do that he is not able to get in to complete Sleep Study until March 2024 and patient was understanding from the Neurologist that is was a concern and needed to act on quickly due to patients dementia.  RNCC suggested patient reach out to Neurologist and explain the situation and see if they are able to help schedule the sleep study sooner than March 2024.  Patient verbalized her understanding and states she will reach out.    Patient is also asking for a Primary Care Provider for her mother to refer to at the [facility]-Dr. NIMISHA Rosenberg's name and number was given.  Patient thanked RNCC for all the information.    Care Gaps:    Health Maintenance Due   Topic Date Due    Pneumococcal Vaccine: Pediatrics (0 to 5 Years) and At-Risk Patients (6 to 64 Years) (2 - PCV) 06/15/2013    RSV VACCINE 60+ (1 - 1-dose 60+ series) Never done    DTAP/TDAP/TD IMMUNIZATION (2 - Td or Tdap) 01/26/2022       Care Gaps Last addressed on 10/27/2023    Care Plans  Care Plan: Healthy eating       Problem: Lifestyle choices       Goal: Healthy meal planning and shopping       Start Date: 1/14/2022 Expected End Date: 1/26/2024    This Visit's Progress: On track Recent Progress: On track    Note:     Goal Statement: I would like assistance and support in maintaining my health and wellness to eat healthy and have a written guide for healthy meal planning and shopping.      Barriers: Patient does not cook or goes grocery shopping; her  did all the cooking  Strengths: Strong advocate for self; strong family support  Patient expressed understanding of goal: Yes  Action steps to achieve this goal:  1. I will follow through with the Registered Dietician's recommendations/suggestions for meal planning  2. I will go to the grocery store and buy protein items that do not need to cooked (yogurt,  "Ensure/Boost, peanut butter, vegetables in small bulk, protein bars, salad mixes...etc)  3. I will use my new Ninja that I recently purchased and read the cook book it came with  4. I will remember to eat by utilizing a \"Paper\" chart on my fridge which is a visual reminder to eat  5. I will review daily the written daily meal planning calendar that will be placed on my fridge  6. I will contact my care team with questions, concerns, support needs   7. I will use the clinic as a resource, and I understand I can contact my clinic with 24/7 after hours services available                             Care Plan: Tobacco cessation       Problem: Lifestyle choices       Goal: Changes in Lifestyle       Start Date: 1/14/2022 Expected End Date: 1/26/2024    This Visit's Progress: Not on track Recent Progress: Not on track    Note:     Barriers: Smoker for years; Patient quit cold turkey on 1/9/2022; relapse February 2022  Strengths: Strong advocate for self, motivated, engaged  Patient expressed understanding of goal: Yes  Action steps to achieve this goal:  1. I will continue to \"keep busy\" and not think or talk about smoking  2. I will continue quitting cold turkey  3. I will apply a Nicotine patch if I can't control the urge  4. I will  place a Nicorette gum to the inside of my cheek every hour until the urge passes  5. I will contact my care team with questions, concerns, support needs                             Intervention/Education provided during outreach:   RNCC called and spoke with patient/caregiver; introduced self, discussed role of Care Coordination and explained reason for call    Plan:   -Patient will contact the care team with questions, concerns, support needs   -Patient will use the clinic as a resource and understands (s)he can contact the [facility] with 24/7 after hours services available  -Care Coordinator will remain available as needed  -RNCC will follow up in one month for follow up " appointments/recommendations and goal progression     Radha Arias RN, BSN, PHN  Primary Care / Care Coordinator   [facility]  E-mail [email or web address]   [phone]

## 2023-11-01 ENCOUNTER — OFFICE VISIT (OUTPATIENT)
Dept: INTERNAL MEDICINE | Facility: CLINIC | Age: 63
End: 2023-11-01
Payer: COMMERCIAL

## 2023-11-01 VITALS
RESPIRATION RATE: 14 BRPM | SYSTOLIC BLOOD PRESSURE: 138 MMHG | TEMPERATURE: 97.3 F | BODY MASS INDEX: 22.13 KG/M2 | DIASTOLIC BLOOD PRESSURE: 84 MMHG | HEART RATE: 95 BPM | OXYGEN SATURATION: 97 % | HEIGHT: 66 IN | WEIGHT: 137.7 LBS

## 2023-11-01 DIAGNOSIS — I10 ESSENTIAL HYPERTENSION: ICD-10-CM

## 2023-11-01 DIAGNOSIS — E78.5 HYPERLIPIDEMIA LDL GOAL <100: ICD-10-CM

## 2023-11-01 DIAGNOSIS — R73.9 HYPERGLYCEMIA: Primary | ICD-10-CM

## 2023-11-01 DIAGNOSIS — F32.9 MAJOR DEPRESSIVE DISORDER, REMISSION STATUS UNSPECIFIED, UNSPECIFIED WHETHER RECURRENT: ICD-10-CM

## 2023-11-01 LAB
ALBUMIN SERPL BCG-MCNC: 4.6 G/DL (ref 3.5–5.2)
ALP SERPL-CCNC: 83 U/L (ref 35–104)
ALT SERPL W P-5'-P-CCNC: 32 U/L (ref 0–50)
ANION GAP SERPL CALCULATED.3IONS-SCNC: 13 MMOL/L (ref 7–15)
AST SERPL W P-5'-P-CCNC: 32 U/L (ref 0–45)
BILIRUB SERPL-MCNC: 0.8 MG/DL
BUN SERPL-MCNC: 8.6 MG/DL (ref 8–23)
CALCIUM SERPL-MCNC: 10.1 MG/DL (ref 8.8–10.2)
CHLORIDE SERPL-SCNC: 99 MMOL/L (ref 98–107)
CHOLEST SERPL-MCNC: 172 MG/DL
CREAT SERPL-MCNC: 0.77 MG/DL (ref 0.51–0.95)
DEPRECATED HCO3 PLAS-SCNC: 28 MMOL/L (ref 22–29)
EGFRCR SERPLBLD CKD-EPI 2021: 87 ML/MIN/1.73M2
GLUCOSE SERPL-MCNC: 100 MG/DL (ref 70–99)
HBA1C MFR BLD: 5.1 % (ref 0–5.6)
HDLC SERPL-MCNC: 86 MG/DL
LDLC SERPL CALC-MCNC: 70 MG/DL
NONHDLC SERPL-MCNC: 86 MG/DL
POTASSIUM SERPL-SCNC: 4.1 MMOL/L (ref 3.4–5.3)
PROT SERPL-MCNC: 7.6 G/DL (ref 6.4–8.3)
SODIUM SERPL-SCNC: 140 MMOL/L (ref 135–145)
TRIGL SERPL-MCNC: 78 MG/DL

## 2023-11-01 PROCEDURE — 80061 LIPID PANEL: CPT | Performed by: INTERNAL MEDICINE

## 2023-11-01 PROCEDURE — 83036 HEMOGLOBIN GLYCOSYLATED A1C: CPT | Performed by: INTERNAL MEDICINE

## 2023-11-01 PROCEDURE — 80053 COMPREHEN METABOLIC PANEL: CPT | Performed by: INTERNAL MEDICINE

## 2023-11-01 PROCEDURE — 99214 OFFICE O/P EST MOD 30 MIN: CPT | Performed by: INTERNAL MEDICINE

## 2023-11-01 PROCEDURE — 36415 COLL VENOUS BLD VENIPUNCTURE: CPT | Performed by: INTERNAL MEDICINE

## 2023-11-01 RX ORDER — BUPROPION HYDROCHLORIDE 150 MG/1
150 TABLET ORAL DAILY
Qty: 90 TABLET | Refills: 3 | Status: SHIPPED | OUTPATIENT
Start: 2023-11-01

## 2023-11-01 NOTE — PROGRESS NOTES
Assessment & Plan     Hyperglycemia  Check fasting blood sugar and A1c level.  - Comprehensive metabolic panel; Future  - Hemoglobin A1c; Future    Essential hypertension  Stable on therapy and continue with current medical management    Hyperlipidemia LDL goal <100  Labs ordered as fasting per routine.    Major depressive disorder, remission status unspecified, unspecified whether recurrent  Blood per patient and continue with current medical management  - Lipid Profile; Future  - buPROPion (WELLBUTRIN XL) 150 MG 24 hr tablet; Take 1 tablet (150 mg) by mouth daily       Work on weight loss  Regular exercise    Eliceo Doran MD  [facility] Mckeon is a 63 year old, presenting for the following health issues:  Results      Follow up 4/27/23 cholesterol and blood sugar.  Has been fasting today.  She denies any other major complaints.  She states her mental health is doing well.  She would like a refill of her medication.  She has recently had some updated vaccinations and it appears that she received another Shingrix vaccination.    History of Present Illness       Reason for visit:  Follow up    She eats 2-3 servings of fruits and vegetables daily.She consumes 0 sweetened beverage(s) daily.She exercises with enough effort to increase her heart rate 9 or less minutes per day.  She exercises with enough effort to increase her heart rate 3 or less days per week.   She is taking medications regularly.       Review of Systems:    CONSTITUTIONAL: NEGATIVE for fever, chills, change in weight  EYES: NEGATIVE for vision changes or irritation  ENT/MOUTH: NEGATIVE for ear, mouth and throat problems  RESP: NEGATIVE for significant cough or SOB  CV: NEGATIVE for chest pain, palpitations or peripheral edema  GI: NEGATIVE for nausea, abdominal pain, heartburn, or change in bowel habits  : NEGATIVE for frequency, dysuria, or hematuria  MUSCULOSKELETAL: NEGATIVE for significant  "arthralgias or myalgia  NEURO: NEGATIVE for weakness, dizziness or paresthesias  ENDOCRINE: NEGATIVE for temperature intolerance, skin/hair changes  HEME: NEGATIVE for bleeding problems  PSYCHIATRIC: NEGATIVE for changes in mood or affect      Objective    /84   Pulse 95   Temp 97.3  F (36.3  C) (Temporal)   Resp 14   Ht 1.664 m (5' 5.5\")   Wt 62.5 kg (137 lb 11.2 oz)   SpO2 97%   BMI 22.57 kg/m    Body mass index is 22.57 kg/m .    Physical Exam   GENERAL: healthy, alert and no distress  EYES: Eyes grossly normal to inspection, PERRL and conjunctivae and sclerae normal  HENT: ear canals and TM's normal, nose and mouth without ulcers or lesions  RESP: lungs clear to auscultation - no rales, rhonchi or wheezes  CV: regular rate and rhythm, normal S1 S2, no S3 or S4, no murmur, click or rub  Small ?bite bug right anterior chest wall and left lateral back  MS: no gross musculoskeletal defects noted  NEURO: No focal changes  PSYCH: mentation appears normal, affect normal/bright    LDL Cholesterol Calculated   Date Value Ref Range Status   04/27/2023 130 (H) <=100 mg/dL Final   10/20/2016 113 mg/dL Final                   "

## 2023-11-01 NOTE — LETTER
November 7, 2023      Linda OREILLY Sada  [id number]  [place] [facility] [address]        Dear ,    We are writing to inform you of your test results.      Your Hemoglobin A1C and blood sugar tests look good and I would continue with your medication without change.  These tests should be repeated in 6 months.     I am pleased to inform you that your routine blood work including your sodium, potassium, calcium, kidney and liver function tests are all normal.     Your cholesterol looks good and I would not change anything at this point but would repeat your labs in 12 months    Resulted Orders   Comprehensive metabolic panel   Result Value Ref Range    Sodium 140 135 - 145 mmol/L      Comment:      Reference intervals for this test were updated on 09/26/2023 to more accurately reflect our healthy population. There may be differences in the flagging of prior results with similar values performed with this method. Interpretation of those prior results can be made in the context of the updated reference intervals.     Potassium 4.1 3.4 - 5.3 mmol/L    Carbon Dioxide (CO2) 28 22 - 29 mmol/L    Anion Gap 13 7 - 15 mmol/L    Urea Nitrogen 8.6 8.0 - 23.0 mg/dL    Creatinine 0.77 0.51 - 0.95 mg/dL    GFR Estimate 87 >60 mL/min/1.73m2    Calcium 10.1 8.8 - 10.2 mg/dL    Chloride 99 98 - 107 mmol/L    Glucose 100 (H) 70 - 99 mg/dL    Alkaline Phosphatase 83 35 - 104 U/L    AST 32 0 - 45 U/L      Comment:      Reference intervals for this test were updated on 6/12/2023 to more accurately reflect our healthy population. There may be differences in the flagging of prior results with similar values performed with this method. Interpretation of those prior results can be made in the context of the updated reference intervals.    ALT 32 0 - 50 U/L      Comment:      Reference intervals for this test were updated on 6/12/2023 to more accurately reflect our healthy population. There may be differences in the flagging  of prior results with similar values performed with this method. Interpretation of those prior results can be made in the context of the updated reference intervals.      Protein Total 7.6 6.4 - 8.3 g/dL    Albumin 4.6 3.5 - 5.2 g/dL    Bilirubin Total 0.8 <=1.2 mg/dL   Hemoglobin A1c   Result Value Ref Range    Hemoglobin A1C 5.1 0.0 - 5.6 %      Comment:      Normal <5.7%   Prediabetes 5.7-6.4%    Diabetes 6.5% or higher     Note: Adopted from ADA consensus guidelines.   Lipid Profile   Result Value Ref Range    Cholesterol 172 <200 mg/dL    Triglycerides 78 <150 mg/dL    Direct Measure HDL 86 >=50 mg/dL    LDL Cholesterol Calculated 70 <=100 mg/dL    Non HDL Cholesterol 86 <130 mg/dL    Narrative    Cholesterol  Desirable:  <200 mg/dL    Triglycerides  Normal:  Less than 150 mg/dL  Borderline High:  150-199 mg/dL  High:  200-499 mg/dL  Very High:  Greater than or equal to 500 mg/dL    Direct Measure HDL  Female:  Greater than or equal to 50 mg/dL   Male:  Greater than or equal to 40 mg/dL    LDL Cholesterol  Desirable:  <100mg/dL  Above Desirable:  100-129 mg/dL   Borderline High:  130-159 mg/dL   High:  160-189 mg/dL   Very High:  >= 190 mg/dL    Non HDL Cholesterol  Desirable:  130 mg/dL  Above Desirable:  130-159 mg/dL  Borderline High:  160-189 mg/dL  High:  190-219 mg/dL  Very High:  Greater than or equal to 220 mg/dL       If you have any questions or concerns, please call the clinic at the number listed above.       Sincerely,      Eliceo Doran MD

## 2023-12-20 ENCOUNTER — NURSE TRIAGE (OUTPATIENT)
Dept: NURSING | Facility: CLINIC | Age: 63
End: 2023-12-20
Payer: COMMERCIAL

## 2023-12-20 NOTE — TELEPHONE ENCOUNTER
Nurse Triage SBAR    Is this a 2nd Level Triage? YES, LICENSED PRACTITIONER REVIEW IS REQUIRED    Situation: Sinus infection    Background: Patient calling, states that she has a sinus infection. She states that her sinus are tender to the touch and that the roof of her mouth hurts.  She states that she has green discharge and her throat is red.  She does have a slight cough but no chest congestion.  She does not think she has a fever.     Assessment: Possible sinus infection    Protocol Recommended Disposition:   See in Office Today    Recommendation: Patient would like to be seen today. Please contact her with any further recommendations.      Routed to provider    ELLIOTT QUINN RN    Does the patient meet one of the following criteria for ADS visit consideration? 16+ years old, with an [facility] PCP     TIP  Providers, please consider if this condition is appropriate for management at one of our Acute and Diagnostic Services sites.     If patient is a good candidate, please use dotphrase <dot>triageresponse and select Refer to ADS to document.    Additional Information   Negative: Sounds like a life-threatening emergency to the triager   Negative: Difficulty breathing, and not from stuffy nose (e.g., not relieved by cleaning out the nose)   Negative: SEVERE headache and has fever   Negative: Patient sounds very sick or weak to the triager   SEVERE sinus pain    Protocols used: Sinus Pain and Congestion-A-[place]

## 2023-12-20 NOTE — TELEPHONE ENCOUNTER
No appointments available today at  or surrounding clinics. Patient agrees to be seen in Urgent Care today.

## 2024-03-05 ENCOUNTER — TRANSFERRED RECORDS (OUTPATIENT)
Dept: HEALTH INFORMATION MANAGEMENT | Facility: CLINIC | Age: 64
End: 2024-03-05
Payer: COMMERCIAL

## 2024-04-03 ENCOUNTER — TELEPHONE (OUTPATIENT)
Dept: OTOLARYNGOLOGY | Facility: CLINIC | Age: 64
End: 2024-04-03
Payer: COMMERCIAL

## 2024-04-04 ENCOUNTER — ANCILLARY PROCEDURE (OUTPATIENT)
Dept: CT IMAGING | Facility: CLINIC | Age: 64
End: 2024-04-04
Attending: OTOLARYNGOLOGY
Payer: COMMERCIAL

## 2024-04-04 DIAGNOSIS — J34.1 MUCOCELE OF NASAL CAVITY: ICD-10-CM

## 2024-04-04 PROCEDURE — 70486 CT MAXILLOFACIAL W/O DYE: CPT

## 2024-04-07 ENCOUNTER — MYC MEDICAL ADVICE (OUTPATIENT)
Dept: OTOLARYNGOLOGY | Facility: CLINIC | Age: 64
End: 2024-04-07
Payer: COMMERCIAL

## 2024-04-08 ENCOUNTER — VIRTUAL VISIT (OUTPATIENT)
Dept: OTOLARYNGOLOGY | Facility: CLINIC | Age: 64
End: 2024-04-08
Payer: COMMERCIAL

## 2024-04-08 ENCOUNTER — PRE VISIT (OUTPATIENT)
Dept: OTOLARYNGOLOGY | Facility: CLINIC | Age: 64
End: 2024-04-08

## 2024-04-08 VITALS — HEIGHT: 62 IN | BODY MASS INDEX: 24.84 KG/M2 | WEIGHT: 135 LBS

## 2024-04-08 DIAGNOSIS — J34.1 MUCOCELE OF NASAL CAVITY: Primary | ICD-10-CM

## 2024-04-08 PROCEDURE — 99441 PR PHYSICIAN TELEPHONE EVALUATION 5-10 MIN: CPT | Mod: 93 | Performed by: OTOLARYNGOLOGY

## 2024-04-08 RX ORDER — AMOXICILLIN 875 MG
1 TABLET ORAL
COMMUNITY
Start: 2023-12-21 | End: 2024-08-07

## 2024-04-08 RX ORDER — HYDROCODONE BITARTRATE AND ACETAMINOPHEN 5; 325 MG/1; MG/1
1 TABLET ORAL EVERY 6 HOURS PRN
COMMUNITY
Start: 2024-01-10 | End: 2024-08-07

## 2024-04-08 NOTE — PATIENT INSTRUCTIONS
You were seen in the ENT Clinic today by Dr. Kamara. If you have any questions or concerns after your appointment, please contact us (see below)       2.   Plan to return to the ENT clinic as needed.           How to Contact Us:  Send a [employer] message to your provider. Our team will respond to you via [employer]. Occasionally, we will need to call you to get further information.  For urgent matters (Monday-Friday), call the ENT Clinic: [phone] and speak with a call center team member - they will route your call appropriately.   If you'd like to speak directly with a nurse, please find our contact information below. We do our best to check voicemail frequently throughout the day, and will work to call you back within 1-2 days. For urgent matters, please use the general clinic phone numbers listed above.        Alice PICKETT LPN  Direct: [phone]         [facility]  Department of Otolaryngology

## 2024-04-08 NOTE — TELEPHONE ENCOUNTER
This writer called patient to discuss change in appointment for today, April 8th. Patient is requesting to do a telephone visit instead of an in person visit. Reviewed with provider patient's request to switch to virtual which provider is okay with. Appointment switched to a telephone return visit at 1:00 pm. Patient is aware that someone will contact her 15 minutes prior to her appointment to get her checked in for the visit. Patient verbalizes understanding of this plan and is in agreement. Patient has no further questions at this time.    JAYDEN GAINES LPN on 4/8/2024 at 10:01 AM

## 2024-04-08 NOTE — PROGRESS NOTES
Linda is a 64 year old who is being evaluated via a billable telephone visit.      Linda has imaging evidence of chronic R sphenoid sinus opacification. Has occasional mild CRS symptoms. Is here for follow-up of imaging to assess sphenoid disease. No new symptoms.     No exam due to nature of telephone visit.     A/P: stable chronic sphenoid sinus opacification, no attributable symptoms. No additional follow-up unless new symptoms. She is comfortable with the plan.     Duration of phone call 5 minutes.

## 2024-04-08 NOTE — LETTER
4/8/2024       RE: Linda Tomlin  [id number]  [place] [facility] [address]     Dear Colleague,    Thank you for referring your patient, Linda Tomlin, to the [facility] [place] [facility]. Please see a copy of my visit note below.    Linda is a 64 year old who is being evaluated via a billable telephone visit.      Linda has imaging evidence of chronic R sphenoid sinus opacification. Has occasional mild CRS symptoms. Is here for follow-up of imaging to assess sphenoid disease. No new symptoms.     No exam due to nature of telephone visit.     A/P: stable chronic sphenoid sinus opacification, no attributable symptoms. No additional follow-up unless new symptoms. She is comfortable with the plan.     Duration of phone call 5 minutes.       Again, thank you for allowing me to participate in the care of your patient.      Sincerely,    Laura Kamara MD

## 2024-04-19 DIAGNOSIS — Z87.891 PERSONAL HISTORY OF TOBACCO USE, PRESENTING HAZARDS TO HEALTH: ICD-10-CM

## 2024-04-19 RX ORDER — METOPROLOL SUCCINATE 50 MG/1
50 TABLET, EXTENDED RELEASE ORAL DAILY
Qty: 90 TABLET | Refills: 3 | Status: SHIPPED | OUTPATIENT
Start: 2024-04-19

## 2024-05-26 ENCOUNTER — HEALTH MAINTENANCE LETTER (OUTPATIENT)
Age: 64
End: 2024-05-26

## 2024-06-05 ENCOUNTER — TRANSFERRED RECORDS (OUTPATIENT)
Dept: HEALTH INFORMATION MANAGEMENT | Facility: CLINIC | Age: 64
End: 2024-06-05
Payer: COMMERCIAL

## 2024-07-17 DIAGNOSIS — I10 PRIMARY HYPERTENSION: ICD-10-CM

## 2024-07-17 DIAGNOSIS — K21.9 GASTROESOPHAGEAL REFLUX DISEASE WITHOUT ESOPHAGITIS: ICD-10-CM

## 2024-07-18 RX ORDER — LOSARTAN POTASSIUM 50 MG/1
50 TABLET ORAL DAILY
Qty: 90 TABLET | Refills: 0 | Status: SHIPPED | OUTPATIENT
Start: 2024-07-18

## 2024-08-07 ENCOUNTER — OFFICE VISIT (OUTPATIENT)
Dept: URGENT CARE | Facility: URGENT CARE | Age: 64
End: 2024-08-07
Payer: COMMERCIAL

## 2024-08-07 VITALS
TEMPERATURE: 98.2 F | DIASTOLIC BLOOD PRESSURE: 71 MMHG | SYSTOLIC BLOOD PRESSURE: 134 MMHG | HEART RATE: 65 BPM | WEIGHT: 140 LBS | OXYGEN SATURATION: 97 % | BODY MASS INDEX: 25.61 KG/M2

## 2024-08-07 DIAGNOSIS — R19.7 DIARRHEA OF PRESUMED INFECTIOUS ORIGIN: Primary | ICD-10-CM

## 2024-08-07 DIAGNOSIS — R11.2 NAUSEA AND VOMITING, UNSPECIFIED VOMITING TYPE: ICD-10-CM

## 2024-08-07 LAB

## 2024-08-07 PROCEDURE — 87507 IADNA-DNA/RNA PROBE TQ 12-25: CPT | Performed by: PHYSICIAN ASSISTANT

## 2024-08-07 PROCEDURE — 99213 OFFICE O/P EST LOW 20 MIN: CPT | Performed by: PHYSICIAN ASSISTANT

## 2024-08-07 RX ORDER — ONDANSETRON 8 MG/1
8 TABLET, ORALLY DISINTEGRATING ORAL EVERY 8 HOURS PRN
Qty: 9 TABLET | Refills: 0 | Status: SHIPPED | OUTPATIENT
Start: 2024-08-07 | End: 2024-08-10

## 2024-08-07 RX ORDER — TRIAMCINOLONE ACETONIDE 0.25 MG/G
CREAM TOPICAL
COMMUNITY
Start: 2024-06-12

## 2024-08-07 NOTE — PROGRESS NOTES
SUBJECTIVE  HPI:  Linda Tomlin is a 64 year old female who presents with the CC of abdominal/pelvic pain.  Loosse stools for 24 hours, ionnummerable. Vomiting x1. Possible Giardia exposure.   No blood in stool; fever; persistent or focal abd pain      Past Medical History:   Diagnosis Date    Breast cancer (H) 2009    lumpectomy and radiation x 35    Diverticular disease of colon     Ovarian cyst     TIA (transient ischemic attack) 01/10/2022     Current Outpatient Medications   Medication Sig Dispense Refill    aspirin (ASA) 81 MG EC tablet Take 1 tablet (81 mg) by mouth daily 90 tablet 1    atorvastatin (LIPITOR) 40 MG tablet TAKE 1 TABLET (40 MG) BY MOUTH DAILY 90 tablet 1    buPROPion (WELLBUTRIN XL) 150 MG 24 hr tablet Take 1 tablet (150 mg) by mouth daily 90 tablet 3    escitalopram (LEXAPRO) 20 MG tablet Take 1 tablet (20 mg) by mouth daily 90 tablet 3    losartan (COZAAR) 50 MG tablet TAKE 1 TABLET (50 MG) BY MOUTH DAILY 90 tablet 0    metoprolol succinate ER (TOPROL XL) 50 MG 24 hr tablet TAKE 1 TABLET (50 MG) BY MOUTH DAILY 90 tablet 3    omeprazole (PRILOSEC) 20 MG DR capsule TAKE 1 CAPSULE BY MOUTH EVERY MORNING BEFORE BREAKFAST 90 capsule 0    triamcinolone (KENALOG) 0.025 % cream APPLY TO AFFECTED AREA AS NEEDED       Social History     Tobacco Use    Smoking status: Former     Current packs/day: 1.00     Average packs/day: 1 pack/day for 20.0 years (20.0 ttl pk-yrs)     Types: Cigarettes    Smokeless tobacco: Never    Tobacco comments:     2-3 a day - working on quitting   Substance Use Topics    Alcohol use: Yes     Alcohol/week: 0.8 - 1.7 standard drinks of alcohol     Types: 1 - 2 Standard drinks or equivalent per week       ROS:  Review of systems negative except as stated above.    OBJECTIVE:  /71   Pulse 65   Temp 98.2  F (36.8  C) (Tympanic)   Wt 63.5 kg (140 lb)   SpO2 97%   BMI 25.61 kg/m    GENERAL APPEARANCE: healthy, alert and no distress  RESP: lungs clear to auscultation  - no rales, rhonchi or wheezes  CV: regular rates and rhythm, normal S1 S2, no murmur noted  NEURO: Normal strength and tone, sensory exam grossly normal,  normal speech and mentation  SKIN: no suspicious lesions or rashes    ASSESSMENT:  (R19.7) Diarrhea of presumed infectious origin  (primary encounter diagnosis)  Plan: Enteric Bacteria and Virus Panel by SH[facility] Stool           (R11.2) Nausea and vomiting, unspecified vomiting type  Plan: ondansetron (ZOFRAN ODT) 8 MG ODT tab         Red flags and emergent follow up discussed, and understood by patient  Follow up with PCP if symptoms worsen or fail to improve

## 2024-08-19 DIAGNOSIS — G45.9 TIA (TRANSIENT ISCHEMIC ATTACK): ICD-10-CM

## 2024-08-19 RX ORDER — ATORVASTATIN CALCIUM 40 MG/1
40 TABLET, FILM COATED ORAL DAILY
Qty: 90 TABLET | Refills: 1 | OUTPATIENT
Start: 2024-08-19

## 2024-10-13 ENCOUNTER — HEALTH MAINTENANCE LETTER (OUTPATIENT)
Age: 64
End: 2024-10-13

## 2024-11-05 ENCOUNTER — ANCILLARY PROCEDURE (OUTPATIENT)
Dept: ULTRASOUND IMAGING | Facility: CLINIC | Age: 64
End: 2024-11-05
Attending: INTERNAL MEDICINE
Payer: COMMERCIAL

## 2024-11-05 DIAGNOSIS — R10.811 RIGHT UPPER QUADRANT ABDOMINAL TENDERNESS: ICD-10-CM

## 2024-11-05 PROCEDURE — 76705 ECHO EXAM OF ABDOMEN: CPT

## 2024-11-11 ENCOUNTER — TRANSFERRED RECORDS (OUTPATIENT)
Dept: SURGERY | Facility: CLINIC | Age: 64
End: 2024-11-11
Payer: COMMERCIAL

## 2024-11-27 ENCOUNTER — HOSPITAL ENCOUNTER (EMERGENCY)
Facility: CLINIC | Age: 64
Discharge: HOME | End: 2024-11-27
Attending: EMERGENCY MEDICINE
Payer: COMMERCIAL

## 2024-11-27 ENCOUNTER — APPOINTMENT (OUTPATIENT)
Dept: ULTRASOUND IMAGING | Facility: CLINIC | Age: 64
End: 2024-11-27
Payer: COMMERCIAL

## 2024-11-27 VITALS
RESPIRATION RATE: 16 BRPM | HEIGHT: 63 IN | TEMPERATURE: 98.5 F | HEART RATE: 67 BPM | OXYGEN SATURATION: 99 % | SYSTOLIC BLOOD PRESSURE: 135 MMHG | DIASTOLIC BLOOD PRESSURE: 87 MMHG | BODY MASS INDEX: 24.8 KG/M2 | WEIGHT: 140 LBS

## 2024-11-27 DIAGNOSIS — R11.2 NAUSEA AND VOMITING: ICD-10-CM

## 2024-11-27 DIAGNOSIS — K80.20 CHOLELITHIASIS: ICD-10-CM

## 2024-11-27 LAB
ALBUMIN SERPL BCG-MCNC: 4.3 G/DL (ref 3.5–5.2)
ALP SERPL-CCNC: 82 U/L (ref 40–150)
ALT SERPL W P-5'-P-CCNC: 16 U/L (ref 0–50)
ANION GAP SERPL CALCULATED.3IONS-SCNC: 11 MMOL/L (ref 7–15)
AST SERPL W P-5'-P-CCNC: 19 U/L (ref 0–45)
BASOPHILS # BLD AUTO: 0 10E3/UL (ref 0–0.2)
BASOPHILS NFR BLD AUTO: 0 %
BILIRUB SERPL-MCNC: 0.4 MG/DL
BUN SERPL-MCNC: 7.5 MG/DL (ref 8–23)
CALCIUM SERPL-MCNC: 9.1 MG/DL (ref 8.8–10.4)
CHLORIDE SERPL-SCNC: 104 MMOL/L (ref 98–107)
CREAT SERPL-MCNC: 0.76 MG/DL (ref 0.51–0.95)
EGFRCR SERPLBLD CKD-EPI 2021: 88 ML/MIN/1.73M2
EOSINOPHIL # BLD AUTO: 0.1 10E3/UL (ref 0–0.7)
EOSINOPHIL NFR BLD AUTO: 2 %
ERYTHROCYTE [DISTWIDTH] IN BLOOD BY AUTOMATED COUNT: 13.4 % (ref 10–15)
GLUCOSE SERPL-MCNC: 97 MG/DL (ref 70–99)
HCO3 SERPL-SCNC: 27 MMOL/L (ref 22–29)
HCT VFR BLD AUTO: 36.8 % (ref 35–47)
HGB BLD-MCNC: 12.2 G/DL (ref 11.7–15.7)
IMM GRANULOCYTES # BLD: 0 10E3/UL
IMM GRANULOCYTES NFR BLD: 0 %
LIPASE SERPL-CCNC: 38 U/L (ref 13–60)
LYMPHOCYTES # BLD AUTO: 1.6 10E3/UL (ref 0.8–5.3)
LYMPHOCYTES NFR BLD AUTO: 19 %
MCH RBC QN AUTO: 31.2 PG (ref 26.5–33)
MCHC RBC AUTO-ENTMCNC: 33.2 G/DL (ref 31.5–36.5)
MCV RBC AUTO: 94 FL (ref 78–100)
MONOCYTES # BLD AUTO: 0.7 10E3/UL (ref 0–1.3)
MONOCYTES NFR BLD AUTO: 8 %
NEUTROPHILS # BLD AUTO: 6.1 10E3/UL (ref 1.6–8.3)
NEUTROPHILS NFR BLD AUTO: 71 %
NRBC # BLD AUTO: 0 10E3/UL
NRBC BLD AUTO-RTO: 0 /100
PLATELET # BLD AUTO: 257 10E3/UL (ref 150–450)
POTASSIUM SERPL-SCNC: 4.5 MMOL/L (ref 3.4–5.3)
PROT SERPL-MCNC: 6.9 G/DL (ref 6.4–8.3)
RBC # BLD AUTO: 3.91 10E6/UL (ref 3.8–5.2)
SODIUM SERPL-SCNC: 142 MMOL/L (ref 135–145)
WBC # BLD AUTO: 8.6 10E3/UL (ref 4–11)

## 2024-11-27 PROCEDURE — 96361 HYDRATE IV INFUSION ADD-ON: CPT

## 2024-11-27 PROCEDURE — 84155 ASSAY OF PROTEIN SERUM: CPT

## 2024-11-27 PROCEDURE — 84460 ALANINE AMINO (ALT) (SGPT): CPT

## 2024-11-27 PROCEDURE — 96374 THER/PROPH/DIAG INJ IV PUSH: CPT

## 2024-11-27 PROCEDURE — 99285 EMERGENCY DEPT VISIT HI MDM: CPT | Mod: 25

## 2024-11-27 PROCEDURE — 250N000011 HC RX IP 250 OP 636

## 2024-11-27 PROCEDURE — 36415 COLL VENOUS BLD VENIPUNCTURE: CPT

## 2024-11-27 PROCEDURE — 258N000003 HC RX IP 258 OP 636: Performed by: EMERGENCY MEDICINE

## 2024-11-27 PROCEDURE — 85004 AUTOMATED DIFF WBC COUNT: CPT

## 2024-11-27 PROCEDURE — 76705 ECHO EXAM OF ABDOMEN: CPT

## 2024-11-27 PROCEDURE — 85018 HEMOGLOBIN: CPT

## 2024-11-27 PROCEDURE — 83690 ASSAY OF LIPASE: CPT

## 2024-11-27 PROCEDURE — 96375 TX/PRO/DX INJ NEW DRUG ADDON: CPT

## 2024-11-27 PROCEDURE — 85041 AUTOMATED RBC COUNT: CPT

## 2024-11-27 RX ORDER — ONDANSETRON 4 MG/1
4 TABLET, ORALLY DISINTEGRATING ORAL EVERY 8 HOURS PRN
Qty: 15 TABLET | Refills: 0 | Status: SHIPPED | OUTPATIENT
Start: 2024-11-27

## 2024-11-27 RX ORDER — ONDANSETRON 2 MG/ML
4 INJECTION INTRAMUSCULAR; INTRAVENOUS EVERY 30 MIN PRN
Status: DISCONTINUED | OUTPATIENT
Start: 2024-11-27 | End: 2024-11-27 | Stop reason: HOSPADM

## 2024-11-27 RX ORDER — HYDROMORPHONE HYDROCHLORIDE 1 MG/ML
0.5 INJECTION, SOLUTION INTRAMUSCULAR; INTRAVENOUS; SUBCUTANEOUS
Status: COMPLETED | OUTPATIENT
Start: 2024-11-27 | End: 2024-11-27

## 2024-11-27 RX ORDER — ACETAMINOPHEN AND CODEINE PHOSPHATE 300; 30 MG/1; MG/1
1 TABLET ORAL EVERY 6 HOURS PRN
Qty: 10 TABLET | Refills: 0 | Status: SHIPPED | OUTPATIENT
Start: 2024-11-27 | End: 2024-11-30

## 2024-11-27 RX ADMIN — ONDANSETRON 4 MG: 2 INJECTION, SOLUTION INTRAMUSCULAR; INTRAVENOUS at 07:50

## 2024-11-27 RX ADMIN — HYDROMORPHONE HYDROCHLORIDE 0.5 MG: 1 INJECTION, SOLUTION INTRAMUSCULAR; INTRAVENOUS; SUBCUTANEOUS at 07:52

## 2024-11-27 RX ADMIN — SODIUM CHLORIDE 1000 ML: 9 INJECTION, SOLUTION INTRAVENOUS at 07:46

## 2024-11-27 ASSESSMENT — ACTIVITIES OF DAILY LIVING (ADL)
ADLS_ACUITY_SCORE: 46

## 2024-11-27 ASSESSMENT — COLUMBIA-SUICIDE SEVERITY RATING SCALE - C-SSRS
1. IN THE PAST MONTH, HAVE YOU WISHED YOU WERE DEAD OR WISHED YOU COULD GO TO SLEEP AND NOT WAKE UP?: NO
2. HAVE YOU ACTUALLY HAD ANY THOUGHTS OF KILLING YOURSELF IN THE PAST MONTH?: NO
6. HAVE YOU EVER DONE ANYTHING, STARTED TO DO ANYTHING, OR PREPARED TO DO ANYTHING TO END YOUR LIFE?: NO

## 2024-11-27 NOTE — ED PROVIDER NOTES
"  Emergency Department Note      History of Present Illness     Chief Complaint   Abdominal Pain      HPI   Linda Tomlin is a 64 year old female with a past medical history of TIA who presents to the emergency department for evaluation of abdominal pain.  Patient reports that she has been having chronic diarrhea, and right upper quadrant ultrasound was performed by her primary care provider on 11/5 which showed evidence of cholelithiasis, see below.  Over the past 4 to 5 days the patient has had increasing right upper quadrant abdominal pain.  States the pain is exacerbated by eating fatty foods.  Overnight last night, the patient had 4-5 episodes of emesis.  Denies hematemesis.  Reports that she has been able to tolerate minimal p.o. intake.  Rates her pain an 8/10 in severity.  Denies pain of this nature previously.  No fevers or chills.  Is scheduled to follow-up with a general surgeon in 6 days from now.    Independent Historian   None    Review of External Notes   RUQ ultrasound 11/5/24:  1.  Cholelithiasis without acute cholecystitis.   2.1 cm gallstone in an otherwise normal gallbladder     Past Medical History     Medical History and Problem List   Breast cancer   Diverticular disease of colon  Ovarian cyst  TIA (transient ischemic attack)  HTN    Medications   aspirin 81 MG  atorvastatin  buPROPion  escitalopram   losartan   metoprolol succinate   omeprazole    Surgical History   Hemicolectomy  Colonoscopy   Removal of plantar fibromas, right foot  Carpal Tunnel Finger/hand Surgery  Hysterectomy  Lumpectomy breast  Tonsillectomy    Physical Exam     Patient Vitals for the past 24 hrs:   BP Temp Temp src Pulse Resp SpO2 Height Weight   11/27/24 1019 135/87 -- -- 67 -- 99 % -- --   11/27/24 0641 (!) 151/96 98.5  F (36.9  C) Oral 95 16 96 % 1.6 m (5' 3\") 63.5 kg (140 lb)     Physical Exam  General: Awake, alert, non-toxic. Uncomfortable.   Head:  Scalp is atraumatic.   Eyes:  Conjunctiva normal, " PERRL  ENT:  The external nose and ears are normal.     Oropharynx clear, uvula midline.  Neck:  Normal range of motion without rigidity.  CV:  Regular rate and rhythm    No pathologic murmur, rubs, or gallops.  Resp:  Breath sounds are clear bilaterally    Non-labored, no retractions or accessory muscle use  Abdomen: RUQ and epigastric tenderness to palpation. No CVA tenderness.  MS:  No lower extremity edema/swelling. Extremities without joint swelling or redness.  Skin:  Warm and dry, No rash or lesions noted.  Neuro:  Alert and oriented.  GCS 15. Moves all extremities normal.  No facial asymmetry.   Psych: Awake. Alert. Normal affect. Appropriate interactions.    Diagnostics     Lab Results   Labs Ordered and Resulted from Time of ED Arrival to Time of ED Departure   COMPREHENSIVE METABOLIC PANEL - Abnormal       Result Value    Sodium 142      Potassium 4.5      Carbon Dioxide (CO2) 27      Anion Gap 11      Urea Nitrogen 7.5 (*)     Creatinine 0.76      GFR Estimate 88      Calcium 9.1      Chloride 104      Glucose 97      Alkaline Phosphatase 82      AST 19      ALT 16      Protein Total 6.9      Albumin 4.3      Bilirubin Total 0.4     LIPASE - Normal    Lipase 38     CBC WITH PLATELETS AND DIFFERENTIAL    WBC Count 8.6      RBC Count 3.91      Hemoglobin 12.2      Hematocrit 36.8      MCV 94      MCH 31.2      MCHC 33.2      RDW 13.4      Platelet Count 257      % Neutrophils 71      % Lymphocytes 19      % Monocytes 8      % Eosinophils 2      % Basophils 0      % Immature Granulocytes 0      NRBCs per 100 WBC 0      Absolute Neutrophils 6.1      Absolute Lymphocytes 1.6      Absolute Monocytes 0.7      Absolute Eosinophils 0.1      Absolute Basophils 0.0      Absolute Immature Granulocytes 0.0      Absolute NRBCs 0.0         Imaging   US Abdomen Limited (RUQ)   Preliminary Result   IMPRESSION:   1.  Cholelithiasis. Negative sonographic Muir's sign. Borderline   wall thickening of the gallbladder. No  biliary ductal dilatation.   2.  Fatty liver. Hepatomegaly.           Independent Interpretation   None    ED Course      Medications Administered   Medications   ondansetron (ZOFRAN) injection 4 mg (4 mg Intravenous $Given 11/27/24 0750)   HYDROmorphone (PF) (DILAUDID) injection 0.5 mg (0.5 mg Intravenous $Given 11/27/24 0752)   sodium chloride 0.9% BOLUS 1,000 mL (0 mLs Intravenous Stopped 11/27/24 0854)       Procedures   Procedures     Discussion of Management   None    ED Course   ED Course as of 11/27/24 1124   Wed Nov 27, 2024   3025 I obtained the history and examined the patient as noted above.         Additional Documentation  None    Medical Decision Making / Diagnosis     CMS Diagnoses: None    MIPS       None    [facility]   Linda Tomlin is a 64 year old female who presented to the emergency department for evaluation of right upper quadrant pain and nausea, vomiting in the setting of known cholelithiasis.  See HPI for further details.  On exam the patient is afebrile and hemodynamically stable.  Differential diagnoses include but not limited to biliary colic, cholecystitis, choledocholithiasis, gallstone pancreatitis, among others.  Laboratory evaluation as above unremarkable.  Lipase within normal limits, low suspicion for pancreatitis.  Ultrasound was performed which did show evidence of cholelithiasis without evidence of choledocholithiasis.  There was evidence of possible borderline gallbladder wall thickening at 3 mm.  Patient is afebrile, without leukocytosis, negative Muir sign, and without pericholecystic fluid therefore I do think it is a very low likelihood of cholecystitis.  The patient's pain and nausea well-controlled in the ED with above interventions.  I discussed the findings with the patient and upon reevaluation she feels markedly improved and comfortable for discharge home.  Patient does have follow-up with general surgery in 6 days, encouraged her to keep this appointment.  Pain  control options were discussed, patient reports allergy to oxycodone.  Reports that Tylenol #3 has worked in the past, therefore prescription sent along with Zofran.  Return precautions discussed including fever, chills, intractable vomiting, inability to tolerate p.o. intake, patient expressed understanding.  All questions answered.  Patient discharged home.    Disposition   The patient was discharged.     Diagnosis     ICD-10-CM    1. Cholelithiasis  K80.20       2. Nausea and vomiting  R11.2            Discharge Medications   Discharge Medication List as of 11/27/2024 10:03 AM        START taking these medications    Details   acetaminophen-codeine (TYLENOL #3) 300-30 MG per tablet Take 1 tablet by mouth every 6 hours as needed for severe pain., Disp-10 tablet, R-0, Local Print      ondansetron (ZOFRAN ODT) 4 MG ODT tab Take 1 tablet (4 mg) by mouth every 8 hours as needed for nausea., Disp-15 tablet, R-0, Local Print               PILY Shah Alexandra, PA-C  11/27/24 1132

## 2024-11-27 NOTE — ED PROVIDER NOTES
ED APC SUPERVISION NOTE:   I evaluated this patient in conjunction with Aisha Woodson PA-C  I have participated in the care of the patient and personally performed key elements of the history, exam, and medical decision making.      HPI:   Linda Tomlin is a 64 year old female with a history of breast cancer, HTN and TIA presenting with abdominal pain. She was recently diagnosed with gallstones; she has a consult with general surgery next Tuesday but she was unable to wait due to symptoms. The patient reports having worsening right-sided abdominal pain, nausea, and emesis onset 5 days ago. She has had similar symptoms in the past but not as bad. She also endorses chronic diarrhea since she had a partial colon resection 12 years ago due to having diverticulitis; she has had 3 diarrhea episodes today. She notices changes when she eats fatty meals, needing have bowel movements more often.  Yesterday she he abdominal pain began to radiate towards her back. She has not been able to get down fluids since yesterday. The patient denies fever or bloody stool. She has not traveled recently and has not had unpurified water.      Independent Historian:   None    Review of External Notes: N/A     EXAM:   General: Alert and cooperative with exam. Patient in mild distress. Normal mentation.  Head:  Scalp is NC/AT  Eyes:  No scleral icterus, PERRL  ENT:  The external nose and ears are normal. The oropharynx is normal and without erythema; mucus membranes somewhat dry. Uvula midline, no evidence of deep space infection.  Neck:  Normal range of motion without rigidity.  CV:  Regular rate and rhythm    No pathologic murmur   Resp:  Breath sounds are clear bilaterally    Non-labored, no retractions or accessory muscle use  GI:  Abdomen is soft, no distension, mild upper quadrant tenderness. No peritoneal signs  MS:  No lower extremity edema   Skin:  Warm and dry, No rash or lesions noted.  Neuro: Oriented x 3. No gross motor  deficits.         Independent Interpretation (X-rays, CTs, rhythm strip):  None    Consultations/Discussion of Management or Tests:  None     MEDICAL DECISION MAKING/ASSESSMENT AND PLAN:   Linda Tomlin is a 64 year old female who presents for evaluation of abdominal pain in the right upper quadrant.  This patient has symptoms consistent with gallstones and biliary colic.  Ultrasound has confirmed the presence of gallstones.  There is no clinical, laboratory, or ultrasound evidence of choledocholithiasis, gallstone pancreatitis, or ascending cholangitis.  I doubt perforated ulcer, diverticulitis, colitis. The patient was educated to avoid fatty foods.  Prescribed analgesics and antiemetic.  The patient was told to return to ED for increasing pain, vomiting, chills, sweats, or fever.  Follow up with general surgery as scheduled next week as indicated for outpatient consultation.        DIAGNOSIS:     ICD-10-CM    1. Cholelithiasis  K80.20       2. Nausea and vomiting  R11.2             Scribe Disclosure:  Shruthi KIMBLE, am serving as a scribe at 7:39 AM on 11/27/2024 to document services personally performed by Miki Del Cid DO based on my observations and the provider's statements to me.   11/27/2024  [facility] EMERGENCY DEPT     Miki Del Cid DO  11/28/24 0710

## 2024-11-27 NOTE — DISCHARGE INSTRUCTIONS
Today you were seen for abdominal pain.  We did do an ultrasound which showed that you still have gallstones, there was a little bit of thickening of the gallbladder however the rest of your labs and your vital signs are reassuring.  Please follow-up with your surgeon as planned on Tuesday.  If you develop fever, chills, uncontrollable nausea and vomiting or any other concerning symptoms please return to the ED.  A small prescription of Tylenol #3 was written please do not take this with tylenol.  Zofran is additionally sent for nausea.  I did attach a low-fat diet for you to look over.    Discharge Instructions  Biliary Colic    You have been seen today for biliary colic. Biliary colic is the pain that happens when gallstones block the normal flow of bile from the gallbladder.  It usually is a steady or crampy pain in the upper abdomen (belly), most often under the right side of the rib cage where the gallbladder is. Sometimes you get pain from the gallbladder in your back or shoulder. It is common to have nausea (sick to stomach) and vomiting (throwing up) with biliary colic.    Bile is a liquid the body makes to help with digesting fat.  It is made by the liver and stored in the gallbladder and released from the gall bladder when you eat fatty foods. Gallstones can form for a variety of reasons. Risk factors for gallstones include being female, having a family history of gallstones, being older, being pregnant or having been pregnant, having diabetes, having rapid weight loss, and others.      Once gallstones form, surgeons usually tell you to have your gallbladder removed. There is medicine that can dissolve gallstones, but it can be unpleasant to take, and gallstones tend to come back when you quit taking the medicine. Your regular provider can help decide on the right treatment for you, and may refer you to a surgeon to discuss whether surgery is right in your case.     Complications of gallstones include  infection, jaundice, inflammation of the pancreas, and rupture of the gallbladder. One of these complications will happen to about one out of every four patients with gallstones over the next 10-20 years if they are not treated.       Generally, every Emergency Department visit should have a follow-up clinic visit with either a primary or a specialty clinic/provider. Please follow-up as instructed by your emergency provider today.    Return to the Emergency Department if you develop:  Fever greater than 100.5 F.   Persistent nausea and vomiting.  Pain that will not go away with the medicines you were given here.  Yellow skin or eye color (jaundice).  Other new concerning symptoms.    What can I do to help myself?  Eat regular meals at least three times a day, to make the gallbladder empty before it gets too full.  Avoid fried or fatty foods.  Drink plenty of clear fluids.  Take over-the-counter or prescribed pain medications as recommended by your provider.      If you were given a prescription for medicine here today, be sure to read all of the information (including the package insert) that comes with your prescription.  This will include important information about the medicine, its side effects, and any warnings that you need to know about.  The pharmacist who fills the prescription can provide more information and answer questions you may have about the medicine.  If you have questions or concerns that the pharmacist cannot address, please call or return to the Emergency Department.     Remember that you can always come back to the Emergency Department if you are not able to see your regular provider in the amount of time listed above, if you get any new symptoms, or if there is anything that worries you.

## 2024-11-27 NOTE — ED TRIAGE NOTES
Patient here with abdominal pain with vomiting. She was recently dx with gallstones.     Triage Assessment (Adult)       Row Name 11/27/24 0642          Triage Assessment    Airway WDL WDL        Respiratory WDL    Respiratory WDL WDL        Skin Circulation/Temperature WDL    Skin Circulation/Temperature WDL WDL        Cardiac WDL    Cardiac WDL WDL        Peripheral/Neurovascular WDL    Peripheral Neurovascular WDL WDL        Cognitive/Neuro/Behavioral WDL    Cognitive/Neuro/Behavioral WDL WDL

## 2024-12-03 ENCOUNTER — TELEPHONE (OUTPATIENT)
Dept: SURGERY | Facility: CLINIC | Age: 64
End: 2024-12-03

## 2024-12-03 ENCOUNTER — OFFICE VISIT (OUTPATIENT)
Dept: SURGERY | Facility: CLINIC | Age: 64
End: 2024-12-03
Payer: COMMERCIAL

## 2024-12-03 VITALS
HEART RATE: 81 BPM | SYSTOLIC BLOOD PRESSURE: 110 MMHG | OXYGEN SATURATION: 96 % | DIASTOLIC BLOOD PRESSURE: 76 MMHG | WEIGHT: 140 LBS | BODY MASS INDEX: 24.8 KG/M2 | HEIGHT: 63 IN

## 2024-12-03 DIAGNOSIS — K80.20 GALLSTONES: Primary | ICD-10-CM

## 2024-12-03 PROCEDURE — 99204 OFFICE O/P NEW MOD 45 MIN: CPT | Performed by: SURGERY

## 2024-12-03 RX ORDER — INDOCYANINE GREEN AND WATER 25 MG
2.5 KIT INJECTION ONCE
OUTPATIENT
Start: 2024-12-03 | End: 2024-12-03

## 2024-12-03 NOTE — PROGRESS NOTES
Chief complaint:  Abdominal pain, right upper quadrant    HPI:  This patient is a 64 year old female who presents with right upper quadrant pain, nausea, and a fairly extended history of explosive diarrhea.  The patient does have a history of a sigmoid colon resection.    Past Medical History:   has a past medical history of Breast cancer (H) (2009), Diverticular disease of colon, Ovarian cyst, and TIA (transient ischemic attack) (01/10/2022).    Past Surgical History:  Past Surgical History:   Procedure Laterality Date    COLON SURGERY  [id number]    HEMICOLECTOMY    COLONOSCOPY N/A 1/3/2019    Procedure: COMBINED COLONOSCOPY, SINGLE OR MULTIPLE BIOPSY/POLYPECTOMY BY BIOPSY;  Surgeon: Mane Jay MD;  Location: [facility]    EXCISE LESION FOOT Right 3/10/2023    Procedure: REMOVAL OF PLANTAR FIBROMAS, RIGHT FOOT;  Surgeon: Kyung Dudley DPM;  Location: [facility] OR    HAND SURGERY  1996    Carpal Tunnel Finger/hand Surgery    HYSTERECTOMY, PAP NO LONGER INDICATED  1994    LUMPECTOMY BREAST  2008    SALPINGO OOPHORECTOMY,R/L/SIMON  01/172011    Salpingo Oophorectomy, RT/LT/SIMON    TONSILLECTOMY  1991        Social History:  Social History     Socioeconomic History    Marital status:      Spouse name: Not on file    Number of children: Not on file    Years of education: Not on file    Highest education level: Not on file   Occupational History    Not on file   Tobacco Use    Smoking status: Former     Current packs/day: 1.00     Average packs/day: 1 pack/day for 20.0 years (20.0 ttl pk-yrs)     Types: Cigarettes     Passive exposure: Never    Smokeless tobacco: Never    Tobacco comments:     2-3 a day - working on quitting   Vaping Use    Vaping status: Never Used   Substance and Sexual Activity    Alcohol use: Yes     Alcohol/week: 0.8 - 1.7 standard drinks of alcohol     Types: 1 - 2 Standard drinks or equivalent per week    Drug use: No    Sexual activity: Yes     Partners: Male   Other Topics Concern     Parent/sibling w/ CABG, MI or angioplasty before 65F 55M? Not Asked   Social History Narrative    Not on file     Social Drivers of Health     Financial Resource Strain: Low Risk  (11/9/2023)    Received from [facility]    Overall Financial Resource Strain (CARDIA)     Difficulty of Paying Living Expenses: Not hard at all   Food Insecurity: No Food Insecurity (11/9/2023)    Received from [facility]    Hunger Vital Sign     Worried About Running Out of Food in the Last Year: Never true     Ran Out of Food in the Last Year: Never true   Transportation Needs: No Transportation Needs (11/9/2023)    Received from [facility]    PRAPARE - Transportation     Lack of Transportation (Medical): No     Lack of Transportation (Non-Medical): No   Physical Activity: Inactive (11/9/2023)    Received from [facility]    Exercise Vital Sign     Days of Exercise per Week: 0 days     Minutes of Exercise per Session: 10 min   Stress: Not on file   Social Connections: Not on file   Interpersonal Safety: Not on file   Housing Stability: Low Risk  (11/9/2023)    Received from [facility]    Housing Stability     What is your living situation today?: I have a steady place to live        Family History:  Family History   Problem Relation Age of Onset    Blood Disease Mother         HHT    Allergies Father     Breast Cancer Maternal Grandmother     Cancer Maternal Grandmother         [place]    Allergies Sister     Allergies Paternal Uncle     Cancer - colorectal Maternal Uncle     Allergies Paternal Aunt        Review of Systems:  The 10 point Review of Systems is negative other than noted in the HPI and above.    Physical Exam:  General - This is a well developed, well nourished female in no apparent distress.  HEENT - Normocephalic, atraumatic.  No scleral icterus.  Neck - supple without masses  Lungs - clear to ascultation.    Heart - Regular rate & rhythm without  murmur  Abdomen:   soft, non-distended with tenderness noted in the right upper quadrant . no masses palpated. normal bowel sounds.  Extremities - warm without edema  Neurologic - nonfocal    Relevant labs:  Normal liver function tests.    Imaging:  Large gallstone on ultrasound with no evidence of wall thickening.    Assessment and Plan:  This is a patient with gallstones and some abdominal pain.  She has also had fairly explosive diarrhea.  It seems somewhat unlikely that this is related to her gallstones, though that is certainly not impossible.  I have recommended Laparoscopic cholecystectomy.  We discussed the procedure, along with its risks and complications, in detail.  The patient has agreed to proceed.  She understands that her diarrhea may either improve or worsen as a result of cholecystectomy.    Wilberto Xavier MD  [facility]    Please route or send letter to:  Referring Provider

## 2024-12-03 NOTE — LETTER
December 3, 2024      RE:   Linda Tomlin 1960      Dear Colleague,    Thank you for referring your patient, Linda Tomlin, to [facility]. Please see a copy of my visit note below.    Chief complaint:  Abdominal pain, right upper quadrant    HPI:  This patient is a 64 year old female who presents with right upper quadrant pain, nausea, and a fairly extended history of explosive diarrhea.  The patient does have a history of a sigmoid colon resection.    Past Medical History:  Has a past medical history of Breast cancer (H) (2009), Diverticular disease of colon, Ovarian cyst, and TIA (transient ischemic attack) (01/10/2022).    Review of Systems:  The 10 point Review of Systems is negative other than noted in the HPI and above.    Physical Exam:  General - This is a well developed, well nourished female in no apparent distress.  HEENT - Normocephalic, atraumatic.  No scleral icterus.  Neck - supple without masses  Lungs - clear to ascultation.    Heart - Regular rate & rhythm without murmur  Abdomen:   soft, non-distended with tenderness noted in the right upper quadrant . no masses palpated. normal bowel sounds.  Extremities - warm without edema  Neurologic - nonfocal    Relevant labs:  Normal liver function tests.    Imaging:  Large gallstone on ultrasound with no evidence of wall thickening.    Assessment and Plan:  This is a patient with gallstones and some abdominal pain.  She has also had fairly explosive diarrhea.  It seems somewhat unlikely that this is related to her gallstones, though that is certainly not impossible.  I have recommended Laparoscopic cholecystectomy.  We discussed the procedure, along with its risks and complications, in detail.  The patient has agreed to proceed.  She understands that her diarrhea may either improve or worsen as a result of cholecystectomy.        Again, thank you for allowing me to participate in the care of  your patient.      Sincerely,      Wilberto Xavier MD

## 2024-12-03 NOTE — TELEPHONE ENCOUNTER
Case Request received to schedule patient for laparoscopic cholecystectomy with Dr Xavier.      Called patient. No answer - left message for patient to return call to schedule surgery at [phone]    Attempt #1 - 12/3 at 3:46     12/3 - patient called back and surgery was scheduled

## 2024-12-03 NOTE — TELEPHONE ENCOUNTER
Type of surgery: LAPAROSCOPIC CHOLECYSTECTOMY   Location of surgery: [facility]  Date and time of surgery: 12-20-24, 7:30 AM  Surgeon: DR US  Pre-Op Appt Date: PATIENT TO SCHEDULE  Post-Op Appt Date: NA   Packet sent out: Yes  Pre-cert/Authorization completed:  Not Applicable  Date: 12-3-24    LAPAROSCOPIC CHOLECYSTECTOMY GENERAL PT INST TO HAVE H&P 60 MINS REQ PA ASSIST [facility]   (75 mins avg)

## 2024-12-12 RX ORDER — CYANOCOBALAMIN 1000 UG/ML
1000 INJECTION, SOLUTION INTRAMUSCULAR; SUBCUTANEOUS WEEKLY
COMMUNITY
Start: 2024-11-08

## 2024-12-12 RX ORDER — HYDROMORPHONE HYDROCHLORIDE 2 MG/1
1-2 TABLET ORAL EVERY 8 HOURS PRN
COMMUNITY
Start: 2024-12-09

## 2024-12-12 RX ORDER — COLESTIPOL HYDROCHLORIDE 1 G/1
1 TABLET ORAL 2 TIMES DAILY
COMMUNITY
Start: 2024-10-24

## 2024-12-20 ENCOUNTER — HOSPITAL ENCOUNTER (OUTPATIENT)
Facility: CLINIC | Age: 64
Discharge: HOME | End: 2024-12-20
Attending: SURGERY | Admitting: SURGERY
Payer: COMMERCIAL

## 2024-12-20 VITALS
SYSTOLIC BLOOD PRESSURE: 130 MMHG | RESPIRATION RATE: 18 BRPM | WEIGHT: 144.4 LBS | BODY MASS INDEX: 25.59 KG/M2 | TEMPERATURE: 97.2 F | HEART RATE: 86 BPM | OXYGEN SATURATION: 93 % | HEIGHT: 63 IN | DIASTOLIC BLOOD PRESSURE: 83 MMHG

## 2024-12-20 DIAGNOSIS — K80.20 GALLSTONES: Primary | ICD-10-CM

## 2024-12-20 PROCEDURE — 250N000025 HC SEVOFLURANE, PER MIN: Performed by: SURGERY

## 2024-12-20 PROCEDURE — 710N000009 HC RECOVERY PHASE 1, LEVEL 1, PER MIN: Performed by: SURGERY

## 2024-12-20 PROCEDURE — 710N000012 HC RECOVERY PHASE 2, PER MINUTE: Performed by: SURGERY

## 2024-12-20 PROCEDURE — 258N000003 HC RX IP 258 OP 636: Performed by: ANESTHESIOLOGY

## 2024-12-20 PROCEDURE — 88304 TISSUE EXAM BY PATHOLOGIST: CPT | Mod: 26 | Performed by: PATHOLOGY

## 2024-12-20 PROCEDURE — 250N000009 HC RX 250: Performed by: SURGERY

## 2024-12-20 PROCEDURE — 47562 LAPAROSCOPIC CHOLECYSTECTOMY: CPT | Performed by: SURGERY

## 2024-12-20 PROCEDURE — 88304 TISSUE EXAM BY PATHOLOGIST: CPT | Mod: TC | Performed by: SURGERY

## 2024-12-20 PROCEDURE — 272N000001 HC OR GENERAL SUPPLY STERILE: Performed by: SURGERY

## 2024-12-20 PROCEDURE — 250N000013 HC RX MED GY IP 250 OP 250 PS 637: Performed by: ANESTHESIOLOGY

## 2024-12-20 PROCEDURE — 258N000001 HC RX 258: Performed by: SURGERY

## 2024-12-20 PROCEDURE — 999N000141 HC STATISTIC PRE-PROCEDURE NURSING ASSESSMENT: Performed by: SURGERY

## 2024-12-20 PROCEDURE — 47562 LAPAROSCOPIC CHOLECYSTECTOMY: CPT | Mod: AS | Performed by: PHYSICIAN ASSISTANT

## 2024-12-20 PROCEDURE — 360N000083 HC SURGERY LEVEL 3 W/ FLUORO, PER MIN: Performed by: SURGERY

## 2024-12-20 PROCEDURE — 250N000011 HC RX IP 250 OP 636: Performed by: ANESTHESIOLOGY

## 2024-12-20 PROCEDURE — 250N000013 HC RX MED GY IP 250 OP 250 PS 637: Performed by: SURGERY

## 2024-12-20 PROCEDURE — 370N000017 HC ANESTHESIA TECHNICAL FEE, PER MIN: Performed by: SURGERY

## 2024-12-20 RX ORDER — INDOCYANINE GREEN AND WATER 25 MG
2.5 KIT INJECTION ONCE
Status: COMPLETED | OUTPATIENT
Start: 2024-12-20 | End: 2024-12-20

## 2024-12-20 RX ORDER — NALOXONE HYDROCHLORIDE 0.4 MG/ML
0.1 INJECTION, SOLUTION INTRAMUSCULAR; INTRAVENOUS; SUBCUTANEOUS
Status: DISCONTINUED | OUTPATIENT
Start: 2024-12-20 | End: 2024-12-20 | Stop reason: HOSPADM

## 2024-12-20 RX ORDER — LABETALOL HYDROCHLORIDE 5 MG/ML
10 INJECTION, SOLUTION INTRAVENOUS
Status: DISCONTINUED | OUTPATIENT
Start: 2024-12-20 | End: 2024-12-20 | Stop reason: HOSPADM

## 2024-12-20 RX ORDER — FENTANYL CITRATE 50 UG/ML
25 INJECTION, SOLUTION INTRAMUSCULAR; INTRAVENOUS ONCE
Status: COMPLETED | OUTPATIENT
Start: 2024-12-20 | End: 2024-12-20

## 2024-12-20 RX ORDER — CEFAZOLIN SODIUM/WATER 2 G/20 ML
2 SYRINGE (ML) INTRAVENOUS
Status: COMPLETED | OUTPATIENT
Start: 2024-12-20 | End: 2024-12-20

## 2024-12-20 RX ORDER — BUPIVACAINE HYDROCHLORIDE AND EPINEPHRINE 5; 5 MG/ML; UG/ML
INJECTION, SOLUTION PERINEURAL PRN
Status: DISCONTINUED | OUTPATIENT
Start: 2024-12-20 | End: 2024-12-20 | Stop reason: HOSPADM

## 2024-12-20 RX ORDER — HYDRALAZINE HYDROCHLORIDE 20 MG/ML
2.5-5 INJECTION INTRAMUSCULAR; INTRAVENOUS EVERY 10 MIN PRN
Status: DISCONTINUED | OUTPATIENT
Start: 2024-12-20 | End: 2024-12-20 | Stop reason: HOSPADM

## 2024-12-20 RX ORDER — CEFAZOLIN SODIUM/WATER 2 G/20 ML
2 SYRINGE (ML) INTRAVENOUS SEE ADMIN INSTRUCTIONS
Status: DISCONTINUED | OUTPATIENT
Start: 2024-12-20 | End: 2024-12-20 | Stop reason: HOSPADM

## 2024-12-20 RX ORDER — ONDANSETRON 4 MG/1
4 TABLET, ORALLY DISINTEGRATING ORAL EVERY 8 HOURS PRN
Qty: 10 TABLET | Refills: 0 | Status: SHIPPED | OUTPATIENT
Start: 2024-12-20

## 2024-12-20 RX ORDER — FENTANYL CITRATE 50 UG/ML
25 INJECTION, SOLUTION INTRAMUSCULAR; INTRAVENOUS EVERY 5 MIN PRN
Status: DISCONTINUED | OUTPATIENT
Start: 2024-12-20 | End: 2024-12-20 | Stop reason: HOSPADM

## 2024-12-20 RX ORDER — SODIUM CHLORIDE, SODIUM LACTATE, POTASSIUM CHLORIDE, CALCIUM CHLORIDE 600; 310; 30; 20 MG/100ML; MG/100ML; MG/100ML; MG/100ML
INJECTION, SOLUTION INTRAVENOUS CONTINUOUS
Status: DISCONTINUED | OUTPATIENT
Start: 2024-12-20 | End: 2024-12-20 | Stop reason: HOSPADM

## 2024-12-20 RX ORDER — ONDANSETRON 4 MG/1
4 TABLET, ORALLY DISINTEGRATING ORAL EVERY 30 MIN PRN
Status: DISCONTINUED | OUTPATIENT
Start: 2024-12-20 | End: 2024-12-20 | Stop reason: HOSPADM

## 2024-12-20 RX ORDER — ONDANSETRON 2 MG/ML
4 INJECTION INTRAMUSCULAR; INTRAVENOUS EVERY 30 MIN PRN
Status: DISCONTINUED | OUTPATIENT
Start: 2024-12-20 | End: 2024-12-20 | Stop reason: HOSPADM

## 2024-12-20 RX ORDER — ALBUTEROL SULFATE 0.83 MG/ML
2.5 SOLUTION RESPIRATORY (INHALATION) EVERY 4 HOURS PRN
Status: DISCONTINUED | OUTPATIENT
Start: 2024-12-20 | End: 2024-12-20 | Stop reason: HOSPADM

## 2024-12-20 RX ORDER — HYDROMORPHONE HCL IN WATER/PF 6 MG/30 ML
0.4 PATIENT CONTROLLED ANALGESIA SYRINGE INTRAVENOUS EVERY 5 MIN PRN
Status: DISCONTINUED | OUTPATIENT
Start: 2024-12-20 | End: 2024-12-20 | Stop reason: HOSPADM

## 2024-12-20 RX ORDER — HYDROCODONE BITARTRATE AND ACETAMINOPHEN 5; 325 MG/1; MG/1
1 TABLET ORAL ONCE
Status: DISCONTINUED | OUTPATIENT
Start: 2024-12-20 | End: 2024-12-20

## 2024-12-20 RX ORDER — HYDROCODONE BITARTRATE AND ACETAMINOPHEN 5; 325 MG/1; MG/1
1 TABLET ORAL
Status: DISCONTINUED | OUTPATIENT
Start: 2024-12-20 | End: 2024-12-20

## 2024-12-20 RX ORDER — HYDROMORPHONE HCL IN WATER/PF 6 MG/30 ML
0.2 PATIENT CONTROLLED ANALGESIA SYRINGE INTRAVENOUS EVERY 5 MIN PRN
Status: DISCONTINUED | OUTPATIENT
Start: 2024-12-20 | End: 2024-12-20 | Stop reason: HOSPADM

## 2024-12-20 RX ORDER — HYDROCODONE BITARTRATE AND ACETAMINOPHEN 5; 325 MG/1; MG/1
1-2 TABLET ORAL EVERY 4 HOURS PRN
Qty: 20 TABLET | Refills: 0 | Status: SHIPPED | OUTPATIENT
Start: 2024-12-20

## 2024-12-20 RX ORDER — DEXAMETHASONE SODIUM PHOSPHATE 4 MG/ML
4 INJECTION, SOLUTION INTRA-ARTICULAR; INTRALESIONAL; INTRAMUSCULAR; INTRAVENOUS; SOFT TISSUE
Status: DISCONTINUED | OUTPATIENT
Start: 2024-12-20 | End: 2024-12-20 | Stop reason: HOSPADM

## 2024-12-20 RX ORDER — FENTANYL CITRATE 50 UG/ML
50 INJECTION, SOLUTION INTRAMUSCULAR; INTRAVENOUS EVERY 5 MIN PRN
Status: DISCONTINUED | OUTPATIENT
Start: 2024-12-20 | End: 2024-12-20 | Stop reason: HOSPADM

## 2024-12-20 RX ADMIN — FENTANYL CITRATE 25 MCG: 50 INJECTION, SOLUTION INTRAMUSCULAR; INTRAVENOUS at 08:55

## 2024-12-20 RX ADMIN — INDOCYANINE GREEN AND WATER 2.5 MG: KIT at 06:26

## 2024-12-20 RX ADMIN — HYDROCODONE BITARTRATE AND ACETAMINOPHEN 1 TABLET: 5; 325 TABLET ORAL at 09:03

## 2024-12-20 RX ADMIN — ONDANSETRON 4 MG: 2 INJECTION INTRAMUSCULAR; INTRAVENOUS at 09:37

## 2024-12-20 RX ADMIN — FENTANYL CITRATE 25 MCG: 50 INJECTION, SOLUTION INTRAMUSCULAR; INTRAVENOUS at 10:23

## 2024-12-20 RX ADMIN — SODIUM CHLORIDE, POTASSIUM CHLORIDE, SODIUM LACTATE AND CALCIUM CHLORIDE: 600; 310; 30; 20 INJECTION, SOLUTION INTRAVENOUS at 06:32

## 2024-12-20 RX ADMIN — HYDROCODONE BITARTRATE AND ACETAMINOPHEN 1 TABLET: 5; 325 TABLET ORAL at 10:32

## 2024-12-20 ASSESSMENT — ACTIVITIES OF DAILY LIVING (ADL)
ADLS_ACUITY_SCORE: 39

## 2024-12-20 NOTE — OP NOTE
General Surgery Operative Note    Pre-operative diagnosis:  Gallstones [K80.20]   Post-operative diagnosis: same   Procedure: Laparoscopic Cholecystectomy   Surgeon: Wilberto Xavier MD   Assistant(s): Misa Roblero PA-C   Anesthesia: General    Estimated blood loss: 5 cc's   Drains placed: None   Complications:  None   Findings:  Gallbladder without obvious acute inflammation.  There was some scarring, suggesting previous inflammation.  The gallbladder contained a large, complex stone.     INDICATIONS FOR OPERATION: This is a patient with upper abdominal pain, gallstones, and diarrhea.  Laparoscopic cholecystectomy was recommended.  The procedure along with its risks and complications was discussed in detail and the patient agreed to proceed.    DETAILS OF THE OPERATION: After informed consent the patient was taken to the operating room where she underwent satisfactory induction of general anesthesia.  The patient was then sterilely prepped and draped.  A supraumbilical skin incision was made using a skin knife.  The dissection was carried bluntly down to the fascia.  The fascia was opened using electrocautery and the Warren trocar was then inserted.  Pneumoperitoneum was achieved using CO2 insufflation, and under direct visualization  three  5 mm upper abdominal ports were placed.  The gallbladder was visualized and was grasped. It was pulled up over the liver and the cystic duct was exposed.  The cystic duct was skeletonized, triple clipped and divided.  The cystic artery was likewise triple clipped and divided.  The gallbladder was then dissected away from the liver using electrocautery.  The gallbladder was then placed in an Endo Catch bag and removed through the supraumbilical incision.  The gallbladder fossa was irrigated out.  There was excellent hemostasis and the clips were in good position.  The trocar sites were now infiltrated with half percent Marcaine with epinephrine.  The trochars were removed  under direct visualization.  The supraumbilical fascia was then closed using 0 Vicryl suture.  Skin incisions were closed using 4-0 subcuticular Vicryl followed by Steri-Strips.    The patient was transferred to the recovery room in satisfactory condition.  Sponge and needle counts were correct at the close of the case.      Specimens:   ID Type Source Tests Collected by Time Destination   1 : Gallbladder and contents Tissue Gallbladder SURGICAL PATHOLOGY EXAM Wilberto Xavier MD 12/20/2024  8:24 AM            Wilberto Xavier MD

## 2025-01-08 ENCOUNTER — TELEPHONE (OUTPATIENT)
Dept: SURGERY | Facility: CLINIC | Age: 65
End: 2025-01-08
Payer: COMMERCIAL

## 2025-01-08 NOTE — TELEPHONE ENCOUNTER
[facility] Postoperative Call Note:     Linda Tomlin was called for an update regarding her recovery. She underwent a laparoscopic cholecystectomy by Dr. Xavier on 12/20/24. Today she tells me she is doing well and denies any complaints. She is eating a normal diet and her bowels are loose, but this is normal for her. She states her wounds are healing well.    The pathology revealed chronic cholecystitis and cholelithiasis. This was discussed with the patient.     Patient was instructed to slowly and gradually resume all normal activities.The patient states all of her questions were answered. She understands our discussion. She agrees to follow up as needed or to call our office with any concerns.    Carlos Philippe PA-C      Please route or send letter to:  Primary Care Provider (PCP)

## 2025-01-17 NOTE — TELEPHONE ENCOUNTER
"FUTURE VISIT INFORMATION      FUTURE VISIT INFORMATION:  Date: 4/8/24  Time: 1pm  Location: [facility] [place]  REFERRAL INFORMATION:  Referring provider:    Referring providers clinic:    Reason for visit/diagnosis  per Ro WARE     RECORDS REQUESTED FROM:       Clinic name Comments Records Status Imaging Status   [facility] ENT 9/22/23- OV bindu Kamara [facility]  8/31/23- OV Rio Hedrick MD  [facility]     Imaging  8/18/23- CT Facial bones   1/9/22- CTA Head Neck   1/9/22- CT Head   1/9/22- CT Head [facility]  PACS           \"Please notify/message CSS if patient completed outside imaging prior to scheduled appointment and/or any outside records that might have been missed at pre visit -Thank you\"  " Request latest labs from pcp

## 2025-01-28 ENCOUNTER — HOSPITAL ENCOUNTER (OUTPATIENT)
Dept: MAMMOGRAPHY | Facility: CLINIC | Age: 65
Discharge: HOME | End: 2025-01-28
Attending: INTERNAL MEDICINE
Payer: COMMERCIAL

## 2025-01-28 DIAGNOSIS — Z12.31 VISIT FOR SCREENING MAMMOGRAM: ICD-10-CM

## 2025-01-28 PROCEDURE — 77063 BREAST TOMOSYNTHESIS BI: CPT

## 2025-06-14 ENCOUNTER — HEALTH MAINTENANCE LETTER (OUTPATIENT)
Age: 65
End: 2025-06-14

## 2025-08-01 ENCOUNTER — HOSPITAL ENCOUNTER (OUTPATIENT)
Dept: CT IMAGING | Facility: CLINIC | Age: 65
Discharge: HOME | End: 2025-08-01
Attending: INTERNAL MEDICINE | Admitting: INTERNAL MEDICINE
Payer: COMMERCIAL

## 2025-08-01 DIAGNOSIS — J01.00 ACUTE MAXILLARY SINUSITIS, UNSPECIFIED: ICD-10-CM

## 2025-08-01 DIAGNOSIS — J33.0 POLYP OF NASAL CAVITY: ICD-10-CM

## 2025-08-01 DIAGNOSIS — J30.9 ALLERGIC RHINITIS, UNSPECIFIED: ICD-10-CM

## 2025-08-01 PROCEDURE — 70486 CT MAXILLOFACIAL W/O DYE: CPT

## (undated) DEVICE — ESU CORD MONOPOLAR 10'  E0510

## (undated) DEVICE — SYR BULB IRRIG DOVER 60 ML LATEX FREE 67000

## (undated) DEVICE — ESU PENCIL W/HOLSTER

## (undated) DEVICE — NDL 25GA 1.5" 305127

## (undated) DEVICE — CAST PADDING 4" STERILE 9044S

## (undated) DEVICE — SUCTION IRR STRYKERFLOW II W/TIP 250-070-520

## (undated) DEVICE — PREP CHLORAPREP 26ML TINTED HI-LITE ORANGE 930815

## (undated) DEVICE — PACK EXTREMITY SOP15EXFSD

## (undated) DEVICE — DRSG KERLIX 4 1/2"X4YDS ROLL 6715

## (undated) DEVICE — SUTURE VICRYL+ 0 CT-2 18" VCP727H

## (undated) DEVICE — DRSG XEROFORM 5X9" 8884431605

## (undated) DEVICE — ESU ELEC BLADE 2.75" COATED/INSULATED E1455

## (undated) DEVICE — GLOVE BIOGEL PI MICRO SZ 8.0 48580

## (undated) DEVICE — SUCTION CANISTER MEDIVAC LINER 3000ML W/LID 65651-530

## (undated) DEVICE — SU ETHILON 3-0 PS-1 18" 1663G

## (undated) DEVICE — SU VICRYL 4-0 P-3 18" UND J494G

## (undated) DEVICE — LINEN POUCH DBL 5427

## (undated) DEVICE — BNDG ELASTIC 4"X5YDS UNSTERILE 6611-40

## (undated) DEVICE — GLOVE BIOGEL PI MICRO SZ 7.5 48575

## (undated) DEVICE — SOL WATER IRRIG 1000ML BOTTLE 2F7114

## (undated) DEVICE — ENDO TROCAR OPTICAL ACCESS KII Z-THRD 12X100MM C0R85

## (undated) DEVICE — LINEN FULL SHEET 5511

## (undated) DEVICE — ENDO TROCAR FIRST ENTRY KII FIOS Z-THRD 05X100MM CTF03

## (undated) DEVICE — ENDO POUCH UNIV RETRIEVAL SYSTEM INZII 10MM CD001

## (undated) DEVICE — DRAPE STERI TOWEL LG 1010

## (undated) DEVICE — SU VICRYL 3-0 SH 27" UND J416H

## (undated) DEVICE — Device

## (undated) DEVICE — ENDO TROCAR SLEEVE KII Z-THREADED 05X100MM CTS02

## (undated) DEVICE — ESU GROUND PAD UNIVERSAL W/O CORD

## (undated) DEVICE — ESU PENCIL W/HOLSTER E2350H

## (undated) DEVICE — LINEN TOWEL PACK X5 5464

## (undated) DEVICE — CLIP APPLIER ENDO 5MM M/L LIGAMAX EL5ML

## (undated) DEVICE — ESU GROUND PAD ADULT W/CORD E7507

## (undated) DEVICE — LINEN HALF SHEET 5512

## (undated) DEVICE — BAG CLEAR TRASH 1.3M 39X33" P4040C

## (undated) RX ORDER — DEXAMETHASONE SODIUM PHOSPHATE 4 MG/ML
INJECTION, SOLUTION INTRA-ARTICULAR; INTRALESIONAL; INTRAMUSCULAR; INTRAVENOUS; SOFT TISSUE
Status: DISPENSED
Start: 2024-12-20

## (undated) RX ORDER — ONDANSETRON 2 MG/ML
INJECTION INTRAMUSCULAR; INTRAVENOUS
Status: DISPENSED
Start: 2019-01-03

## (undated) RX ORDER — FENTANYL CITRATE 50 UG/ML
INJECTION, SOLUTION INTRAMUSCULAR; INTRAVENOUS
Status: DISPENSED
Start: 2019-01-03

## (undated) RX ORDER — LIDOCAINE HYDROCHLORIDE 10 MG/ML
INJECTION, SOLUTION EPIDURAL; INFILTRATION; INTRACAUDAL; PERINEURAL
Status: DISPENSED
Start: 2023-03-10

## (undated) RX ORDER — FENTANYL CITRATE 50 UG/ML
INJECTION, SOLUTION INTRAMUSCULAR; INTRAVENOUS
Status: DISPENSED
Start: 2023-03-10

## (undated) RX ORDER — LIDOCAINE HYDROCHLORIDE 10 MG/ML
INJECTION, SOLUTION EPIDURAL; INFILTRATION; INTRACAUDAL; PERINEURAL
Status: DISPENSED
Start: 2024-12-20

## (undated) RX ORDER — LABETALOL HYDROCHLORIDE 5 MG/ML
INJECTION, SOLUTION INTRAVENOUS
Status: DISPENSED
Start: 2024-12-20

## (undated) RX ORDER — KETOROLAC TROMETHAMINE 30 MG/ML
INJECTION, SOLUTION INTRAMUSCULAR; INTRAVENOUS
Status: DISPENSED
Start: 2024-12-20

## (undated) RX ORDER — INDOCYANINE GREEN AND WATER 25 MG
KIT INJECTION
Status: DISPENSED
Start: 2024-12-20

## (undated) RX ORDER — DEXAMETHASONE SODIUM PHOSPHATE 4 MG/ML
INJECTION, SOLUTION INTRA-ARTICULAR; INTRALESIONAL; INTRAMUSCULAR; INTRAVENOUS; SOFT TISSUE
Status: DISPENSED
Start: 2023-03-10

## (undated) RX ORDER — BUPIVACAINE HYDROCHLORIDE AND EPINEPHRINE 5; 5 MG/ML; UG/ML
INJECTION, SOLUTION EPIDURAL; INTRACAUDAL; PERINEURAL
Status: DISPENSED
Start: 2024-12-20

## (undated) RX ORDER — CEFAZOLIN SODIUM/WATER 2 G/20 ML
SYRINGE (ML) INTRAVENOUS
Status: DISPENSED
Start: 2023-03-10

## (undated) RX ORDER — FENTANYL CITRATE 50 UG/ML
INJECTION, SOLUTION INTRAMUSCULAR; INTRAVENOUS
Status: DISPENSED
Start: 2024-12-20

## (undated) RX ORDER — ONDANSETRON 2 MG/ML
INJECTION INTRAMUSCULAR; INTRAVENOUS
Status: DISPENSED
Start: 2024-12-20

## (undated) RX ORDER — PROPOFOL 10 MG/ML
INJECTION, EMULSION INTRAVENOUS
Status: DISPENSED
Start: 2023-03-10

## (undated) RX ORDER — HYDROCODONE BITARTRATE AND ACETAMINOPHEN 5; 325 MG/1; MG/1
TABLET ORAL
Status: DISPENSED
Start: 2024-12-20

## (undated) RX ORDER — BUPIVACAINE HYDROCHLORIDE 5 MG/ML
INJECTION, SOLUTION EPIDURAL; INTRACAUDAL
Status: DISPENSED
Start: 2023-03-10

## (undated) RX ORDER — ONDANSETRON 2 MG/ML
INJECTION INTRAMUSCULAR; INTRAVENOUS
Status: DISPENSED
Start: 2023-03-10

## (undated) RX ORDER — PROPOFOL 10 MG/ML
INJECTION, EMULSION INTRAVENOUS
Status: DISPENSED
Start: 2024-12-20

## (undated) RX ORDER — CEFAZOLIN SODIUM/WATER 2 G/20 ML
SYRINGE (ML) INTRAVENOUS
Status: DISPENSED
Start: 2024-12-20

## (undated) RX ORDER — GLYCOPYRROLATE 0.2 MG/ML
INJECTION, SOLUTION INTRAMUSCULAR; INTRAVENOUS
Status: DISPENSED
Start: 2024-12-20

## (undated) RX ORDER — FENTANYL CITRATE-0.9 % NACL/PF 10 MCG/ML
PLASTIC BAG, INJECTION (ML) INTRAVENOUS
Status: DISPENSED
Start: 2024-12-20